# Patient Record
Sex: FEMALE | Employment: FULL TIME | URBAN - METROPOLITAN AREA
[De-identification: names, ages, dates, MRNs, and addresses within clinical notes are randomized per-mention and may not be internally consistent; named-entity substitution may affect disease eponyms.]

---

## 2017-01-09 LAB
ANTIBODY SCREEN, EXTERNAL: NORMAL
CHLAMYDIA, EXTERNAL: NORMAL
HGB EVAL, EXTERNAL: NORMAL
HIV, EXTERNAL: NON REACTIVE
N. GONORRHEA, EXTERNAL: NORMAL
PLATELET CNT,   EXTERNAL: 227
T. PALLIDUM, EXTERNAL: NORMAL
TYPE, ABO & RH, EXTERNAL: NORMAL

## 2017-05-08 LAB — URINALYSIS, EXTERNAL: NORMAL

## 2017-05-30 LAB
HCT, EXTERNAL: 31.6
HGB, EXTERNAL: 10.5
RUBELLA, EXTERNAL: NORMAL

## 2017-07-24 LAB — GRBS, EXTERNAL: NORMAL

## 2017-08-12 ENCOUNTER — ANESTHESIA EVENT (OUTPATIENT)
Dept: LABOR AND DELIVERY | Age: 32
End: 2017-08-12
Payer: COMMERCIAL

## 2017-08-12 ENCOUNTER — HOSPITAL ENCOUNTER (INPATIENT)
Age: 32
LOS: 2 days | Discharge: HOME | End: 2017-08-15
Attending: OBSTETRICS & GYNECOLOGY | Admitting: OBSTETRICS & GYNECOLOGY
Payer: COMMERCIAL

## 2017-08-12 ENCOUNTER — ANESTHESIA (OUTPATIENT)
Dept: LABOR AND DELIVERY | Age: 32
End: 2017-08-12
Payer: COMMERCIAL

## 2017-08-12 PROBLEM — Z37.9 NORMAL LABOR: Status: ACTIVE | Noted: 2017-08-12

## 2017-08-12 LAB
BASOPHILS # BLD AUTO: 0 K/UL (ref 0–0.1)
BASOPHILS # BLD: 0 % (ref 0–1)
EOSINOPHIL # BLD: 0.1 K/UL (ref 0–0.4)
EOSINOPHIL NFR BLD: 1 % (ref 0–7)
ERYTHROCYTE [DISTWIDTH] IN BLOOD BY AUTOMATED COUNT: 15 % (ref 11.5–14.5)
HCT VFR BLD AUTO: 37.6 % (ref 35–47)
HGB BLD-MCNC: 12.4 G/DL (ref 11.5–16)
LYMPHOCYTES # BLD AUTO: 14 % (ref 12–49)
LYMPHOCYTES # BLD: 1.4 K/UL (ref 0.8–3.5)
MCH RBC QN AUTO: 30 PG (ref 26–34)
MCHC RBC AUTO-ENTMCNC: 33 G/DL (ref 30–36.5)
MCV RBC AUTO: 91 FL (ref 80–99)
MONOCYTES # BLD: 1.3 K/UL (ref 0–1)
MONOCYTES NFR BLD AUTO: 12 % (ref 5–13)
NEUTS SEG # BLD: 7.6 K/UL (ref 1.8–8)
NEUTS SEG NFR BLD AUTO: 73 % (ref 32–75)
PLATELET # BLD AUTO: 214 K/UL (ref 150–400)
RBC # BLD AUTO: 4.13 M/UL (ref 3.8–5.2)
WBC # BLD AUTO: 10.3 K/UL (ref 3.6–11)

## 2017-08-12 PROCEDURE — 76815 OB US LIMITED FETUS(S): CPT | Performed by: OBSTETRICS & GYNECOLOGY

## 2017-08-12 PROCEDURE — 99284 EMERGENCY DEPT VISIT MOD MDM: CPT | Performed by: OBSTETRICS & GYNECOLOGY

## 2017-08-12 PROCEDURE — 77030014125 HC TY EPDRL BBMI -B: Performed by: ANESTHESIOLOGY

## 2017-08-12 PROCEDURE — 36415 COLL VENOUS BLD VENIPUNCTURE: CPT | Performed by: OBSTETRICS & GYNECOLOGY

## 2017-08-12 PROCEDURE — 74011250636 HC RX REV CODE- 250/636: Performed by: OBSTETRICS & GYNECOLOGY

## 2017-08-12 PROCEDURE — 75410000002 HC LABOR FEE PER 1 HR: Performed by: OBSTETRICS & GYNECOLOGY

## 2017-08-12 PROCEDURE — 10907ZC DRAINAGE OF AMNIOTIC FLUID, THERAPEUTIC FROM PRODUCTS OF CONCEPTION, VIA NATURAL OR ARTIFICIAL OPENING: ICD-10-PCS | Performed by: OBSTETRICS & GYNECOLOGY

## 2017-08-12 PROCEDURE — 85025 COMPLETE CBC W/AUTO DIFF WBC: CPT | Performed by: OBSTETRICS & GYNECOLOGY

## 2017-08-12 PROCEDURE — 4A0HXCZ MEASUREMENT OF PRODUCTS OF CONCEPTION, CARDIAC RATE, EXTERNAL APPROACH: ICD-10-PCS | Performed by: OBSTETRICS & GYNECOLOGY

## 2017-08-12 PROCEDURE — 96360 HYDRATION IV INFUSION INIT: CPT | Performed by: OBSTETRICS & GYNECOLOGY

## 2017-08-12 PROCEDURE — 59025 FETAL NON-STRESS TEST: CPT | Performed by: OBSTETRICS & GYNECOLOGY

## 2017-08-12 RX ORDER — FENTANYL/BUPIVACAINE/NS/PF 2-1250MCG
1-16 PREFILLED PUMP RESERVOIR EPIDURAL CONTINUOUS
Status: DISCONTINUED | OUTPATIENT
Start: 2017-08-13 | End: 2017-08-13

## 2017-08-12 RX ORDER — SODIUM CHLORIDE, SODIUM LACTATE, POTASSIUM CHLORIDE, CALCIUM CHLORIDE 600; 310; 30; 20 MG/100ML; MG/100ML; MG/100ML; MG/100ML
1000 INJECTION, SOLUTION INTRAVENOUS CONTINUOUS
Status: DISCONTINUED | OUTPATIENT
Start: 2017-08-12 | End: 2017-08-13

## 2017-08-12 RX ORDER — LIDOCAINE HYDROCHLORIDE AND EPINEPHRINE 20; 5 MG/ML; UG/ML
INJECTION, SOLUTION EPIDURAL; INFILTRATION; INTRACAUDAL; PERINEURAL AS NEEDED
Status: DISCONTINUED | OUTPATIENT
Start: 2017-08-12 | End: 2017-08-13 | Stop reason: HOSPADM

## 2017-08-12 RX ORDER — NALOXONE HYDROCHLORIDE 0.4 MG/ML
0.4 INJECTION, SOLUTION INTRAMUSCULAR; INTRAVENOUS; SUBCUTANEOUS AS NEEDED
Status: DISCONTINUED | OUTPATIENT
Start: 2017-08-12 | End: 2017-08-13

## 2017-08-12 RX ORDER — SODIUM CHLORIDE, SODIUM LACTATE, POTASSIUM CHLORIDE, CALCIUM CHLORIDE 600; 310; 30; 20 MG/100ML; MG/100ML; MG/100ML; MG/100ML
125 INJECTION, SOLUTION INTRAVENOUS CONTINUOUS
Status: DISCONTINUED | OUTPATIENT
Start: 2017-08-12 | End: 2017-08-13

## 2017-08-12 RX ADMIN — SODIUM CHLORIDE, SODIUM LACTATE, POTASSIUM CHLORIDE, AND CALCIUM CHLORIDE 125 ML/HR: 600; 310; 30; 20 INJECTION, SOLUTION INTRAVENOUS at 22:30

## 2017-08-12 RX ADMIN — SODIUM CHLORIDE, SODIUM LACTATE, POTASSIUM CHLORIDE, AND CALCIUM CHLORIDE 1000 ML: 600; 310; 30; 20 INJECTION, SOLUTION INTRAVENOUS at 21:30

## 2017-08-12 RX ADMIN — LIDOCAINE HYDROCHLORIDE AND EPINEPHRINE 10 ML: 20; 5 INJECTION, SOLUTION EPIDURAL; INFILTRATION; INTRACAUDAL; PERINEURAL at 23:47

## 2017-08-12 NOTE — IP AVS SNAPSHOT
[facility] 
 
 
 [address] [place] [address] 
[phone] Patient: Gretchen Thayer MRN: [id number] CEV:[id number] Current Discharge Medication List  
  
START taking these medications Dose & Instructions Dispensing Information Comments Morning Noon Evening Bedtime  
 docusate sodium 100 mg capsule Commonly known as:  Emerita Verduzco Your last dose was: Your next dose is:    
   
   
 Dose:  100 mg Take 1 Cap by mouth two (2) times daily as needed for Constipation for up to 90 days. Quantity:  30 Cap Refills:  1 HYDROcodone-acetaminophen 7.5-325 mg per tablet Commonly known as:  Jaxson Logan Your last dose was: Your next dose is:    
   
   
 Dose:  1 Tab Take 1 Tab by mouth every six (6) hours as needed. Max Daily Amount: 4 Tabs. Quantity:  8 Tab Refills:  0  
     
   
   
   
  
 ibuprofen 800 mg tablet Commonly known as:  MOTRIN Your last dose was: Your next dose is:    
   
   
 Dose:  800 mg Take 1 Tab by mouth every eight (8) hours as needed for Pain. Quantity:  40 Tab Refills:  1 Where to Get Your Medications Information on where to get these meds will be given to you by the nurse or doctor. ! Ask your nurse or doctor about these medications  
  docusate sodium 100 mg capsule HYDROcodone-acetaminophen 7.5-325 mg per tablet  
 ibuprofen 800 mg tablet

## 2017-08-12 NOTE — IP AVS SNAPSHOT
Abiodun Rao 
 
 
 [facility] [address] 
[phone] Patient: Angelica Iniguez MRN: [id number] QPS: You are allergic to the following Allergen Reactions Penicillin G Hives  
 childhood Recent Documentation Height Weight Breastfeeding? BMI OB Status Smoking Status 1.727 m 86.2 kg Unknown 28.89 kg/m2 Recent pregnancy Never Assessed Unresulted Labs Order Current Status SAMPLE TO BLOOD BANK In process Emergency Contacts Name Discharge Info Relation Home Work Mobile SanchezFred DISCHARGE CAREGIVER [3] Spouse [3]   [phone] About your hospitalization You were admitted on:  2017 You last received care in the:  [facility] 3 MOTHER INFANT You were discharged on:  August 15, 2017 Unit phone number:  [phone] Why you were hospitalized Your primary diagnosis was:  Not on File Your diagnoses also included:  Normal Labor, Abnormal  Test  
  
  
 
  
  
Providers Seen During Your Hospitalizations Provider Role Specialty Primary office phone Toro Underwood MD Attending Provider Obstetrics & Gynecology [phone] Jordana Lemus MD Attending Provider Obstetrics & Gynecology [phone] Your Primary Care Physician (PCP) Primary Care Physician Office Phone Office Fax NONE ** None ** ** None ** Follow-up Information Follow up With Details Comments Contact Info None   None (395) Patient stated that they have no PCP In 6 weeks Current Discharge Medication List  
  
START taking these medications Dose & Instructions Dispensing Information Comments Morning Noon Evening Bedtime  
 docusate sodium 100 mg capsule Commonly known as:  Ezio Norwich Your last dose was: Your next dose is:    
   
   
 Dose:  100 mg Take 1 Cap by mouth two (2) times daily as needed for Constipation for up to 90 days. Quantity:  30 Cap Refills:  1 HYDROcodone-acetaminophen 7.5-325 mg per tablet Commonly known as:  Tellis Points Your last dose was: Your next dose is:    
   
   
 Dose:  1 Tab Take 1 Tab by mouth every six (6) hours as needed. Max Daily Amount: 4 Tabs. Quantity:  8 Tab Refills:  0  
     
   
   
   
  
 ibuprofen 800 mg tablet Commonly known as:  MOTRIN Your last dose was: Your next dose is:    
   
   
 Dose:  800 mg Take 1 Tab by mouth every eight (8) hours as needed for Pain. Quantity:  40 Tab Refills:  1 Where to Get Your Medications Information on where to get these meds will be given to you by the nurse or doctor. ! Ask your nurse or doctor about these medications  
  docusate sodium 100 mg capsule HYDROcodone-acetaminophen 7.5-325 mg per tablet  
 ibuprofen 800 mg tablet Discharge Instructions POST DELIVERY DISCHARGE INSTRUCTIONS Name: Harika Velez YOB: 1985 Primary Diagnosis: Active Problems: 
  Normal labor (2017) Abnormal  test (2017) General:  
 
Diet/Diet Restrictions: 
· Eight 8-ounce glasses of fluid daily (water, juices); avoid excessive caffeine intake. · Meals/snacks as desired which are high in fiber and carbohydrates and low in fat and cholesterol. Physical Activity / Restrictions / Safety: · Avoid heavy lifting, no more that 8 lbs. For 2-3 weeks;  
· Limit use of stairs to 2 times daily for the first week home. · No driving for one week. · Avoid intercourse 4-6 weeks, no douching or tampon use. · Check with obstetrician before starting or resuming an exercise program.   
 
Discharge Instructions/Special Treatment/Home Care Needs:  
 
· Continue prenatal vitamins. · Continue to use squirt bottle with warm water on your episiotomy after each bathroom use until bleeding stops. · If steri-strips applied to your incision, remove in 7-10 days. Call your doctor for the following: · Fever over 101 degrees by mouth. · Vaginal bleeding heavier than a normal menstrual period or clots larger than a golf ball. · Red streaks or increased swelling of legs, painful red streaks on your breast. 
· Painful urination, constipation and increased pain or swelling or discharge with your incision. · If you feel extremely anxious or overwhelmed. · If you have thoughts of harming yourself and/or your baby. Pain Management:  
 
· Take Acetaminophen (Tylenol) or Ibuprofen (Advil, Motrin), as directed for pain. · Use a warm Sitz bath 3 times daily to relieve episiotomy or hemorrhoidal discomfort. · For hemorrhoidal discomfort, use Tucks and Anusol cream as needed and directed. · Heating pad to  incision as needed. Follow-Up Care:  
 
Appointment with MD: Follow-up Appointments Procedures  FOLLOW UP VISIT Appointment in: 6 Weeks Standing Status:   Standing Number of Occurrences:   1 Order Specific Question:   Appointment in Answer:   6 Weeks Telephone number: [phone] Signed By: Chirag Solorzano MD                                                                                                   Date: 8/15/2017 Time: 9:08 AM 
 
 
Discharge Orders None [employer] Announcement We are excited to announce that we are making your provider's discharge notes available to you in [employer] You will see these notes when they are completed and signed by the physician that discharged you from your recent hospital stay. If you have any questions or concerns about any information you see in [employer] please call the Health Information Department where you were seen or reach out to your Primary Care Provider for more information about your plan of care. Introducing [facility]!    
 [facility] introduces [employer] patient portal. Now you can access parts of your medical record, email your doctor's office, and request medication refills online. 1. In your internet browser, go to [email or web address] 2. Click on the First Time User? Click Here link in the Sign In box. You will see the New Member Sign Up page. 3. Enter your [employer] Access Code exactly as it appears below. You will not need to use this code after youve completed the sign-up process. If you do not sign up before the expiration date, you must request a new code. · [employer] Access Code: [email or web address] Expires: 10/14/2017  9:07 AM 
 
4. Enter the last four digits of your Social Security Number (xxxx) and Date of Birth (mm/dd/yyyy) as indicated and click Submit. You will be taken to the next sign-up page. 5. Create a [employer] ID. This will be your [employer] login ID and cannot be changed, so think of one that is secure and easy to remember. 6. Create a [employer] password. You can change your password at any time. 7. Enter your Password Reset Question and Answer. This can be used at a later time if you forget your password. 8. Enter your e-mail address. You will receive e-mail notification when new information is available in [address] 9. Click Sign Up. You can now view and download portions of your medical record. 10. Click the Download Summary menu link to download a portable copy of your medical information. If you have questions, please visit the Frequently Asked Questions section of the [employer] website. Remember, [employer] is NOT to be used for urgent needs. For medical emergencies, dial 911. Now available from your iPhone and Android! General Information Please provide this summary of care documentation to your next provider. Patient Signature:  ____________________________________________________________ Date:  ____________________________________________________________  
  
[facility]  Provider Signature: ____________________________________________________________ Date:  ____________________________________________________________

## 2017-08-12 NOTE — IP AVS SNAPSHOT
Summary of Care Report The Summary of Care report has been created to help improve care coordination. Users with access to [employer] or [address] (Web-based application) may access additional patient information including the Discharge Summary. If you are not currently a [address] user and need more information, please call the number listed below in the [place] section and ask to be connected with Medical Records. Facility Information Name Address Phone [address] [facility] [address] [phone] Patient Information Patient Name Sex  Sonia Wu ([id number]) Female 1985 Discharge Information Admitting Provider Service Area Unit Irene Sutherland MD / Jayshree Liao [address] [facility] 3 Mother Infant / [phone] Discharge Provider Discharge Date/Time Discharge Disposition Destination (none) 8/15/2017 (Pending) AHR (none) Patient Language Language ENGLISH [13] Hospital Problems as of 8/15/2017  Never Reviewed Class Noted - Resolved Last Modified POA Active Problems Normal labor  2017 - Present 2017 by Irene Sutherland MD Unknown Entered by Irene Sutherland MD  
  Abnormal  test  2017 - Present 2017 by Irene Sutherland MD Unknown Entered by Irene Sutherland MD  
  
You are allergic to the following Allergen Reactions Penicillin G Hives  
 childhood Current Discharge Medication List  
  
START taking these medications Dose & Instructions Dispensing Information Comments  
 docusate sodium 100 mg capsule Commonly known as:  Edi Leon Dose:  100 mg Take 1 Cap by mouth two (2) times daily as needed for Constipation for up to 90 days. Quantity:  30 Cap Refills:  1 HYDROcodone-acetaminophen 7.5-325 mg per tablet Commonly known as:  Alvina Hodgson Dose:  1 Tab Take 1 Tab by mouth every six (6) hours as needed. Max Daily Amount: 4 Tabs. Quantity:  8 Tab Refills:  0  
   
 ibuprofen 800 mg tablet Commonly known as:  MOTRIN Dose:  800 mg Take 1 Tab by mouth every eight (8) hours as needed for Pain. Quantity:  40 Tab Refills:  1 Surgery Information ID Date/Time Status Primary Surgeon All Procedures Location [id number] 2017 Complete   [facility] - DO NOT SCHEDULE Follow-up Information Follow up With Details Comments Contact Info None   None (395) Patient stated that they have no PCP In 6 weeks Discharge Instructions POST DELIVERY DISCHARGE INSTRUCTIONS Name: Oneida Flores YOB: 1985 Primary Diagnosis: Active Problems: 
  Normal labor (2017) Abnormal  test (2017) General:  
 
Diet/Diet Restrictions: 
· Eight 8-ounce glasses of fluid daily (water, juices); avoid excessive caffeine intake. · Meals/snacks as desired which are high in fiber and carbohydrates and low in fat and cholesterol. Physical Activity / Restrictions / Safety: · Avoid heavy lifting, no more that 8 lbs. For 2-3 weeks;  
· Limit use of stairs to 2 times daily for the first week home. · No driving for one week. · Avoid intercourse 4-6 weeks, no douching or tampon use. · Check with obstetrician before starting or resuming an exercise program.   
 
Discharge Instructions/Special Treatment/Home Care Needs:  
 
· Continue prenatal vitamins. · Continue to use squirt bottle with warm water on your episiotomy after each bathroom use until bleeding stops. · If steri-strips applied to your incision, remove in 7-10 days. Call your doctor for the following: · Fever over 101 degrees by mouth. · Vaginal bleeding heavier than a normal menstrual period or clots larger than a golf ball. · Red streaks or increased swelling of legs, painful red streaks on your breast. 
· Painful urination, constipation and increased pain or swelling or discharge with your incision. · If you feel extremely anxious or overwhelmed. · If you have thoughts of harming yourself and/or your baby. Pain Management:  
 
· Take Acetaminophen (Tylenol) or Ibuprofen (Advil, Motrin), as directed for pain. · Use a warm Sitz bath 3 times daily to relieve episiotomy or hemorrhoidal discomfort. · For hemorrhoidal discomfort, use Tucks and Anusol cream as needed and directed. · Heating pad to  incision as needed. Follow-Up Care:  
 
Appointment with MD: Follow-up Appointments Procedures  FOLLOW UP VISIT Appointment in: 6 Weeks Standing Status:   Standing Number of Occurrences:   1 Order Specific Question:   Appointment in Answer:   6 Weeks Telephone number: [phone] Signed By: Kevin Peace MD                                                                                                   Date: 8/15/2017 Time: 9:08 AM 
 
 
Chart Review Routing History No Routing History on File

## 2017-08-13 PROBLEM — O28.9 ABNORMAL ANTENATAL TEST: Status: ACTIVE | Noted: 2017-08-13

## 2017-08-13 PROCEDURE — 3E0R3CZ INTRODUCE REGIONAL ANESTH IN SPINAL CANAL, PERC: ICD-10-PCS | Performed by: ANESTHESIOLOGY

## 2017-08-13 PROCEDURE — 88307 TISSUE EXAM BY PATHOLOGIST: CPT | Performed by: OBSTETRICS & GYNECOLOGY

## 2017-08-13 PROCEDURE — 00HU33Z INSERTION OF INFUSION DEVICE INTO SPINAL CANAL, PERCUTANEOUS APPROACH: ICD-10-PCS | Performed by: ANESTHESIOLOGY

## 2017-08-13 PROCEDURE — 76060000078 HC EPIDURAL ANESTHESIA: Performed by: ANESTHESIOLOGY

## 2017-08-13 PROCEDURE — 74011250636 HC RX REV CODE- 250/636: Performed by: ANESTHESIOLOGY

## 2017-08-13 PROCEDURE — 74011250637 HC RX REV CODE- 250/637: Performed by: OBSTETRICS & GYNECOLOGY

## 2017-08-13 PROCEDURE — 51702 INSERT TEMP BLADDER CATH: CPT

## 2017-08-13 PROCEDURE — 75410000000 HC DELIVERY VAGINAL/SINGLE: Performed by: OBSTETRICS & GYNECOLOGY

## 2017-08-13 PROCEDURE — 74011250636 HC RX REV CODE- 250/636: Performed by: OBSTETRICS & GYNECOLOGY

## 2017-08-13 PROCEDURE — 77030034850

## 2017-08-13 PROCEDURE — 75410000003 HC RECOV DEL/VAG/CSECN EA 0.5 HR: Performed by: OBSTETRICS & GYNECOLOGY

## 2017-08-13 PROCEDURE — 74011000258 HC RX REV CODE- 258: Performed by: OBSTETRICS & GYNECOLOGY

## 2017-08-13 PROCEDURE — 75410000002 HC LABOR FEE PER 1 HR: Performed by: OBSTETRICS & GYNECOLOGY

## 2017-08-13 PROCEDURE — 74011250636 HC RX REV CODE- 250/636

## 2017-08-13 PROCEDURE — 77010026065 HC OXYGEN MINIMUM MEDICAL AIR: Performed by: OBSTETRICS & GYNECOLOGY

## 2017-08-13 PROCEDURE — 65270000029 HC RM PRIVATE

## 2017-08-13 PROCEDURE — 74011000250 HC RX REV CODE- 250

## 2017-08-13 RX ORDER — SODIUM CHLORIDE, SODIUM LACTATE, POTASSIUM CHLORIDE, CALCIUM CHLORIDE 600; 310; 30; 20 MG/100ML; MG/100ML; MG/100ML; MG/100ML
125 INJECTION, SOLUTION INTRAVENOUS CONTINUOUS
Status: DISCONTINUED | OUTPATIENT
Start: 2017-08-13 | End: 2017-08-15 | Stop reason: HOSPADM

## 2017-08-13 RX ORDER — ACETAMINOPHEN 500 MG
1000 TABLET ORAL
Status: COMPLETED | OUTPATIENT
Start: 2017-08-13 | End: 2017-08-13

## 2017-08-13 RX ORDER — SODIUM CHLORIDE 0.9 % (FLUSH) 0.9 %
5-10 SYRINGE (ML) INJECTION AS NEEDED
Status: DISCONTINUED | OUTPATIENT
Start: 2017-08-13 | End: 2017-08-15 | Stop reason: HOSPADM

## 2017-08-13 RX ORDER — HYDROCORTISONE ACETATE PRAMOXINE HCL 2.5; 1 G/100G; G/100G
CREAM TOPICAL AS NEEDED
Status: DISCONTINUED | OUTPATIENT
Start: 2017-08-13 | End: 2017-08-15 | Stop reason: HOSPADM

## 2017-08-13 RX ORDER — IBUPROFEN 800 MG/1
800 TABLET ORAL EVERY 8 HOURS
Status: DISCONTINUED | OUTPATIENT
Start: 2017-08-13 | End: 2017-08-15 | Stop reason: HOSPADM

## 2017-08-13 RX ORDER — HYDROCODONE BITARTRATE AND ACETAMINOPHEN 7.5; 325 MG/1; MG/1
1 TABLET ORAL
Status: DISCONTINUED | OUTPATIENT
Start: 2017-08-13 | End: 2017-08-15 | Stop reason: HOSPADM

## 2017-08-13 RX ORDER — NALOXONE HYDROCHLORIDE 0.4 MG/ML
0.4 INJECTION, SOLUTION INTRAMUSCULAR; INTRAVENOUS; SUBCUTANEOUS AS NEEDED
Status: DISCONTINUED | OUTPATIENT
Start: 2017-08-13 | End: 2017-08-15 | Stop reason: HOSPADM

## 2017-08-13 RX ORDER — SODIUM CHLORIDE 0.9 % (FLUSH) 0.9 %
5-10 SYRINGE (ML) INJECTION EVERY 8 HOURS
Status: DISCONTINUED | OUTPATIENT
Start: 2017-08-13 | End: 2017-08-15 | Stop reason: HOSPADM

## 2017-08-13 RX ORDER — SWAB
1 SWAB, NON-MEDICATED MISCELLANEOUS DAILY
Status: DISCONTINUED | OUTPATIENT
Start: 2017-08-13 | End: 2017-08-15 | Stop reason: HOSPADM

## 2017-08-13 RX ORDER — ACETAMINOPHEN 325 MG/1
650 TABLET ORAL
Status: DISCONTINUED | OUTPATIENT
Start: 2017-08-13 | End: 2017-08-15 | Stop reason: HOSPADM

## 2017-08-13 RX ORDER — MORPHINE SULFATE 10 MG/ML
5 INJECTION, SOLUTION INTRAMUSCULAR; INTRAVENOUS
Status: DISCONTINUED | OUTPATIENT
Start: 2017-08-13 | End: 2017-08-15 | Stop reason: HOSPADM

## 2017-08-13 RX ORDER — ACETAMINOPHEN 500 MG
TABLET ORAL
Status: DISPENSED
Start: 2017-08-13 | End: 2017-08-13

## 2017-08-13 RX ORDER — OXYTOCIN IN 5 % DEXTROSE 30/500 ML
PLASTIC BAG, INJECTION (ML) INTRAVENOUS
Status: COMPLETED
Start: 2017-08-13 | End: 2017-08-13

## 2017-08-13 RX ORDER — OXYTOCIN/RINGER'S LACTATE 20/1000 ML
125-500 PLASTIC BAG, INJECTION (ML) INTRAVENOUS ONCE
Status: ACTIVE | OUTPATIENT
Start: 2017-08-13 | End: 2017-08-13

## 2017-08-13 RX ADMIN — GENTAMICIN SULFATE 319.6 MG: 40 INJECTION, SOLUTION INTRAMUSCULAR; INTRAVENOUS at 01:11

## 2017-08-13 RX ADMIN — ACETAMINOPHEN 1000 MG: 500 TABLET ORAL at 01:12

## 2017-08-13 RX ADMIN — Medication 10 ML: at 13:30

## 2017-08-13 RX ADMIN — MUPIROCIN: 20 OINTMENT TOPICAL at 22:00

## 2017-08-13 RX ADMIN — FENTANYL 0.2 MG/100ML-BUPIV 0.125%-NACL 0.9% EPIDURAL INJ 12 ML/HR: 2/0.125 SOLUTION at 00:10

## 2017-08-13 RX ADMIN — VANCOMYCIN HYDROCHLORIDE 1000 MG: 1 INJECTION, POWDER, LYOPHILIZED, FOR SOLUTION INTRAVENOUS at 13:30

## 2017-08-13 RX ADMIN — VANCOMYCIN HYDROCHLORIDE 1000 MG: 1 INJECTION, POWDER, LYOPHILIZED, FOR SOLUTION INTRAVENOUS at 00:02

## 2017-08-13 RX ADMIN — SODIUM CHLORIDE, SODIUM LACTATE, POTASSIUM CHLORIDE, AND CALCIUM CHLORIDE 125 ML/HR: 600; 310; 30; 20 INJECTION, SOLUTION INTRAVENOUS at 00:26

## 2017-08-13 RX ADMIN — Medication 999 ML/HR: at 01:40

## 2017-08-13 NOTE — PROGRESS NOTES
0700 Received bedside verbal report from Cynthia Cardenas RN. Patient is sitting up in the bed and ready to be transferred to MIU. Patient denies pain but does confirm some cramping and does not want any medication at his time. Waiting for baby report to be given and will transfer patient to MIU.       0830 TRANSFER - OUT REPORT:    Verbal report given to Teresa Mcclain RN(name) on Armida Peoples  being transferred to MIU(unit) for routine progression of care       Report consisted of patients Situation, Background, Assessment and   Recommendations(SBAR). Information from the following report(s) SBAR, Kardex and MAR was reviewed with the receiving nurse. Lines:   Peripheral IV 08/12/17 Right Hand (Active)   Site Assessment Clean, dry, & intact 8/13/2017  3:34 AM   Phlebitis Assessment 0 8/13/2017  3:34 AM   Infiltration Assessment 0 8/13/2017  3:34 AM   Dressing Status Clean, dry, & intact 8/13/2017  3:34 AM   Dressing Type Tape;Transparent 8/13/2017  3:34 AM   Hub Color/Line Status Pink 8/13/2017  3:34 AM        Opportunity for questions and clarification was provided.       Patient transported with:   Registered Nurse

## 2017-08-13 NOTE — PROGRESS NOTES
[address] Dr Judy Colon notified of [address], pt given 1 liter bolus, juice, O2 and Acoustic Stimulation attempted with no response from fetus. Requested to come to bedside to eval pt.  2245 Dr Judy Colon at bedside, Ultrasound preformed. 2300 Dr Judy Colon discussed with pt US results and need for induction  2304 AROM  2310 pt request epidural  2330 pt sitting up for epidural placement  0630 Pt up to BR walks with a steady gait. Able to void 300ml. Gricelda care taught with verbal understanding and demonstration by pt. New ice pack, tucks, panties and gown applied to pt. Epidural removed with tip intact. Pt assisted back to bed. Informed to call with needs and she needs to be assisted to BR at least once more time. Pt states understanding.

## 2017-08-13 NOTE — PROGRESS NOTES
Bedside and Verbal shift change report given to [facility] (oncoming nurse) by Arleth Harkins RN (offgoing nurse). Report included the following information SBAR, Intake/Output, MAR and Recent Results.

## 2017-08-13 NOTE — ANESTHESIA PREPROCEDURE EVALUATION
Anesthetic History No history of anesthetic complications Review of Systems / Medical History Patient summary reviewed, nursing notes reviewed and pertinent labs reviewed Pulmonary Within defined limits Neuro/Psych Within defined limits Cardiovascular Within defined limits Exercise tolerance: >4 METS 
  
GI/Hepatic/Renal 
Within defined limits Endo/Other Within defined limits Other Findings Physical Exam 
 
Airway Mallampati: II 
 
Neck ROM: normal range of motion Mouth opening: Normal 
 
 Cardiovascular Regular rate and rhythm,  S1 and S2 normal,  no murmur, click, rub, or gallop Rhythm: regular Rate: normal 
 
 
 
 Dental 
No notable dental hx Pulmonary Breath sounds clear to auscultation Abdominal 
GI exam deferred Other Findings Anesthetic Plan ASA: 2 Anesthesia type: epidural 
 
 
Post-op pain plan if not by surgeon: indwelling epidural catheter Anesthetic plan and risks discussed with: Patient Late entry - preop done, questions answered, and consent obtained prior to procedure; note entered after procedure at 11:49 PM

## 2017-08-13 NOTE — PROGRESS NOTES
Bedside, Verbal and Written shift change report given to Naya Jordan (oncoming nurse) by Franky Lincoln (offgoing nurse). Report included the following information SBAR, Intake/Output, MAR and Recent Results.

## 2017-08-13 NOTE — ANESTHESIA PROCEDURE NOTES
Epidural Block    Start time: 8/12/2017 11:33 PM  End time: 8/12/2017 11:50 PM  Performed by: Garret Barajas by: Liyah Tapia     Pre-Procedure  Indication: labor epidural    Preanesthetic Checklist: patient identified, risks and benefits discussed, anesthesia consent, patient being monitored, timeout performed and anesthesia consent    Timeout Time: 23:33        Epidural:   Patient position:  Seated  Prep region:  Lumbar  Prep: Betadine    Location:  L3-4    Needle and Epidural Catheter:   Needle Type:  Tuohy  Needle Gauge:  17 G  Injection Technique:  Loss of resistance using air  Attempts:  1  Catheter Size:  20 G  Catheter at Skin Depth (cm):  11  Depth in Epidural Space (cm):  5  Events: no blood with aspiration, no cerebrospinal fluid with aspiration and negative aspiration test    Test Dose:  Lidocaine 1.5% w/ epi and negative    Assessment:   Catheter Secured:  Tape  Insertion:  Uncomplicated  Patient tolerance:  Patient tolerated the procedure well with no immediate complications

## 2017-08-13 NOTE — L&D DELIVERY NOTE
Patient: Morena Rasheed  MRN: [id number]   Delivery Note    Obstetrician:  Torrie Gowers, MD    Pre-Delivery Diagnosis:   <principal problem not specified>  Non-reassuring  testing  Delivery:          Amniotic Fluid Volume : Moderate thick meconium    Delivery Complications:  Chorioamnionitis  Decreased variability  Foul smelling fluid     Additional Delivery Details:         Infant Details:  Information for the patient's :  Ravin Coker [[id number]]          No results found for: APH, APCO2, APO2, AHCO3, ABEC, ABDC, O2ST, SITE, RSCOM    Placenta:    meconium stained, foul smelling    Episiotomy/Laceration(s):none            Episiotomy/Laceration(s) Repair: Not necessary.      Group Beta Strep:negative       Signed By:  Torrie Gowers, MD     2017

## 2017-08-13 NOTE — PROGRESS NOTES
Bedside and Verbal shift change report given to HUMERA Quintana RN (oncoming nurse) by ALYSA Gonzalez RN (offgoing nurse). Report included the following information SBAR, Kardex, Intake/Output and MAR.

## 2017-08-13 NOTE — H&P
History & Physical    Name: Lonnie De Paz MRN: [id number]  SSN: [id number]    YOB: 1985  Age: 28 y.o. Sex: female        Subjective:     Estimated Date of Delivery: 17  OB History    Para Term  AB Living   1        SAB TAB Ectopic Molar Multiple Live Births              # Outcome Date GA Lbr Santiago/2nd Weight Sex Delivery Anes PTL Lv   1 Current                   Ms. Liam Baeza is seen with pregnancy at 38w6d for contractions. Patient denies leakage of fluid, vaginal bleeding, RUQ pain, epigastric pain, chest pain, visual disturbances, headache, urinary complaints and swelling. She is able to appreciate fetal movements. Prenatal course was normal. Please see prenatal records for details. No past medical history on file. No past surgical history on file. Social History     Occupational History    Not on file. Social History Main Topics    Smoking status: Not on file    Smokeless tobacco: Not on file    Alcohol use Not on file    Drug use: Not on file    Sexual activity: Not on file     No family history on file. Allergies   Allergen Reactions    Penicillin G Hives     childhood     Prior to Admission medications    Not on File        Review of Systems: A comprehensive review of systems was negative except for that written in the HPI. Objective:     Vitals:  Vitals:    17 2040 17 2304   Temp:  99 °F (37.2 °C)   Weight: 86.2 kg (190 lb)    Height: 5' 8\" (1.727 m)         Physical Exam:  Patient without distress.   Heart: Regular rate and rhythm or S1S2 present  Lung: clear to auscultation throughout lung fields, no wheezes, no rales, no rhonchi and normal respiratory effort  Abdomen: soft, nontender, protuberant  Fundus: soft and non tender  Cervical Exam: 3 cm dilated    Lower Extremities:  - Edema 1+  Cervical Exam: Cervical Exam  Dilation (cm): 3  Eff: 90 %  Station: -2  Position: Mid  Vaginal exam done by? : halle  Membrane Status: AROM  Membranes: Artificial Rupture of Membranes; Amniotic Fluid: medium amount of thick meconium fluid  Fetal Heart Rate: non-reactive  Biophysical profile at bedside  with points for KOKI    Prenatal Labs:   No results found for: RUBELLAEXT, GRBSEXT, HBSAGEXT, HIVEXT, RPREXT, GONNOEXT, CHLAMEXT      Assessment/Plan:     Ms. Marsha Garibay is a  seen with pregnancy at 38w6d for induction of labor. Group B Strep was negative. Plan:   1. Prenatal course normal - Poor  testing tonight. Would recommend delivery. Membranes ruptured, foul smelling amniotic fluid with thick meconium, I strongly suspect that she may have chorioamnionitis. Will start antibiotic therapy and request NICU attendance at delivery. 2. Continue expectant management: Fetal Monitoring, tocometry, serial cervical checks Q3-4h in latent phase; Q2h in active labor and Q1h in 2nd stage   3. Anticipate vaginal delivery  4. Pain management with IV analgesics (Nubain 10mg Q2h IV PRN), or epidural anesthesia. 5. Zofran 4mg IV Q4h PRN for nausea   6.  Diet: NPO with ice chips            Renae Roger MD    2017

## 2017-08-13 NOTE — PROGRESS NOTES
SBAR IN Report: Mother    Verbal report received from HUMERA Heard RN (full name & credentials) on this patient, who is now being transferred from L&D (unit) for routine progression of care. Report consisted of patient's Situation, Background, Assessment and Recommendations (SBAR). Irma ID bands were compared with the identification form, and verified with the patient and transferring nurse. Information from the SBAR, Kardex, Intake/Output and MAR and the [place] Report was reviewed with the transferring nurse; opportunity for questions and clarification provided.

## 2017-08-14 LAB
HCT VFR BLD AUTO: 31.9 % (ref 35–47)
HGB BLD-MCNC: 10.9 G/DL (ref 11.5–16)

## 2017-08-14 PROCEDURE — 85018 HEMOGLOBIN: CPT | Performed by: OBSTETRICS & GYNECOLOGY

## 2017-08-14 PROCEDURE — 65270000029 HC RM PRIVATE

## 2017-08-14 PROCEDURE — 74011000258 HC RX REV CODE- 258: Performed by: OBSTETRICS & GYNECOLOGY

## 2017-08-14 PROCEDURE — 36415 COLL VENOUS BLD VENIPUNCTURE: CPT | Performed by: OBSTETRICS & GYNECOLOGY

## 2017-08-14 PROCEDURE — 74011250637 HC RX REV CODE- 250/637: Performed by: OBSTETRICS & GYNECOLOGY

## 2017-08-14 PROCEDURE — 74011250636 HC RX REV CODE- 250/636: Performed by: OBSTETRICS & GYNECOLOGY

## 2017-08-14 RX ADMIN — Medication 10 ML: at 00:42

## 2017-08-14 RX ADMIN — VANCOMYCIN HYDROCHLORIDE 1000 MG: 1 INJECTION, POWDER, LYOPHILIZED, FOR SOLUTION INTRAVENOUS at 02:56

## 2017-08-14 RX ADMIN — Medication 1 TABLET: at 09:53

## 2017-08-14 RX ADMIN — GENTAMICIN SULFATE 319.6 MG: 40 INJECTION, SOLUTION INTRAMUSCULAR; INTRAVENOUS at 00:34

## 2017-08-14 NOTE — ROUTINE PROCESS
Bedside and Verbal shift change report given to CHARLEY Segura RN (oncoming nurse) by Monica Beltran (offgoing nurse). Report included the following information SBAR, Procedure Summary, Intake/Output, MAR, Accordion, Recent Results and Med Rec Status.

## 2017-08-14 NOTE — PROGRESS NOTES
Post-Partum Day Number 1 Progress Note    Edith Sanchez     Assessment: Doing well, post partum day 1    Plan:  1. Continue routine postpartum and perineal care as well as maternal education. 2. The risks and benefits of the circumcision  procedure and anesthesia including: bleeding, infection, variability of cosmetic results were discussed at length with the mother. She is aware that future repeat procedures may be necessary. She gives informed consent to proceed as noted and her questions are answered. Will hold on circumcision until this afternoon as mom reports some feeding problems for baby. Information for the patient's :  Belgica Castro, Male [[id number]]   Vaginal, Spontaneous Delivery   Patient doing well without significant complaint. Voiding without difficulty, normal lochia. Vitals:  Visit Vitals    /55 (BP 1 Location: Left arm, BP Patient Position: At rest)    Pulse 97    Temp 98.6 °F (37 °C)    Resp 16    Ht 5' 8\" (1.727 m)    Wt 86.2 kg (190 lb)    SpO2 99%    Breastfeeding Unknown    BMI 28.89 kg/m2     Temp (24hrs), Av.3 °F (36.8 °C), Min:97.4 °F (36.3 °C), Max:98.6 °F (37 °C)        Exam:   Patient without distress. Abdomen soft, fundus firm, nontender                Perineum with normal lochia noted. Lower extremities are negative for swelling, cords or tenderness.     Labs:     Lab Results   Component Value Date/Time    WBC 10.3 2017 11:15 PM    HGB 10.9 2017 12:28 AM    HGB 12.4 2017 11:15 PM    HCT 31.9 2017 12:28 AM    HCT 37.6 2017 11:15 PM    PLATELET 102  11:15 PM    Hgb, External 10.5 2017    Hct, External 31.6 2017    Platelet cnt., External 227 2017       Recent Results (from the past 24 hour(s))   HGB & HCT    Collection Time: 17 12:28 AM   Result Value Ref Range    HGB 10.9 (L) 11.5 - 16.0 g/dL    HCT 31.9 (L) 35.0 - 47.0 %

## 2017-08-14 NOTE — PROGRESS NOTES
0730 Patient assessment complete; patient denies any pain at this time; denies any other needs at this time; is ordering breakfast at this time. IV removed without difficulty as antibiotic treatment complete.

## 2017-08-14 NOTE — PROGRESS NOTES
08/14/17 1:21 PM  CM met with STAR to complete initial assessment and to begin discharge planning. Demographics were reviewed and confirmed. STAR works for [employer] and will have between 6 and 12 weeks off. LUBNA is Billie Rapp ([phone]) and he will have a few days away. This is the first child for MOB and LUBNA together; LUBNA has three other sons, of which the 3year old resides with the couple. STAR's mother is her main support, she lives in [place] but has been with STAR since March and plans to stay for several more months. STAR is breastfeeding and has a pump to use. Patient has car seat, crib, clothing, and other necessary supplies. [facility] will provide medical follow up for the baby. Denied need for [facility] and Medicaid services.   Care Management Interventions  PCP Verified by CM:  (List provided)  Transition of Care Consult (CM Consult): Discharge Planning  Current Support Network: Lives with Spouse  Confirm Follow Up Transport: Family  Plan discussed with Pt/Family/Caregiver: Yes  Discharge Location  Discharge Placement: [address] [place] [address]

## 2017-08-14 NOTE — ROUTINE PROCESS
Bedside and Verbal shift change report given to [facility] (oncoming nurse) by Minnie Murdock RN (offgoing nurse). Report included the following information SBAR, Kardex, Intake/Output and MAR.

## 2017-08-14 NOTE — LACTATION NOTE
Problem: Lactation Care Plan  Goal: *Infant latching appropriately  Outcome: Progressing Towards Goal  Baby latches then has a few suckles and stops. Shown mom how to hand express, many drops noted,  Baby licks at drops and latches.       Discussed [id number] different position, how to massge breat to continue  Giving drops to  Baby.        Pt will successfully establish breastfeeding by feeding in response to early feeding cues   or wake every 3h, will obtain deep latch, and will keep log of feedings/output. Taught to BF at hunger cues and or q 2-3 hrs and to offer 10-20 drops of hand expressed colostrum at any non-feeds.        Breast Assessment  Left Breast: Medium  Left Nipple: Everted, Intact  Right Breast: Medium  Right Nipple: Everted, Intact  Breast- Feeding Assessment  Attends Breast-Feeding Classes: No  Breast-Feeding Experience: No  Breast Trauma/Surgery: No  Type/Quality: [address] [email or web address] [facility]  Lactation Consultant Visits  Breast-Feedings: Fair  Mother/Infant Observation  Mother Observation: Alignment, Breast comfortable, Close hold, Holds breast, Lets baby end feeding, Nipple round on release, Recognizes feeding cues  Infant Observation: Audible swallows, Breast tissue moves, Latches nipple and aereolae, Lips flanged, lower, Lips flanged, upper, Opens mouth  LATCH Documentation  Latch: Repeated attempts, hold nipple in mouth, stimulate to suck  Audible Swallowing: A few with stimulation  Type of Nipple: Everted (after stimulation)  Comfort (Breast/Nipple): Soft/non-tender  Hold (Positioning): Full assist, teach one side, mother does other, staff holds  LATCH Score: 7          Goal: *Weight loss less than 10% of birth weight  Outcome: Progressing Towards Goal      Encouraged mom to attempt feeding with baby led feeding cues. Just as sucking on fingers, rooting, mouthing. Looking for 8-12 feedings in 24 hours. Don't limit baby at breast, allow baby to come of breast on it's own.  Baby may want to feed  often and may increase number of feedings on second day of life. Skin to skin encouraged.        If baby doesn't nurse,  Mom should  hand express  10-20 drops of colostrum and drip into baby's mouth, or give to baby by finger feeding, cup feeding, or spoon feeding at least every 2-3 hours.          Problem: Patient Education: Go to Patient Education Activity  Goal: Patient/Family Education  Outcome: Progressing Towards Goal  Reviewed breastfeeding basics:  Supply and demand,  stomach size, early  Feeding cues, skin to skin, positioning and baby led latch-on, assymetrical latch with signs of good, deep latch vs shallow, feeding frequency and duration, and log sheet for tracking infant feedings and output. Breastfeeding Booklet and Warm line information given. Discussed typical  weight loss and the importance of infant weight checks with pediatrician 1-2 post discharge.      Hand Expression Education:  Mom taught how to manually hand express her colostrum. Emphasized the importance of providing infant with valuable colostrum as infant rests skin to skin at breast.  Aware to avoid extended periods of non-feeding. Aware to offer 10-20+ drops of colostrum every 2-3 hours until infant is latching and nursing effectively. Taught the rationale behind this low tech but highly effective evidence based practice.      Discussed with mother her plan for feeding. Reviewed the benefits of exclusive breast milk feeding during the hospital stay. Informed her of the risks of using formula to supplement in the first few days of life as well as the benefits of successful breast milk feeding; referred her to the Breastfeeding booklet about this information. She acknowledges understanding of information reviewed and states that it is her plan to breast feed first, and may offer formula to her infant.   Will support her choice and offer additional information as needed.

## 2017-08-15 VITALS
TEMPERATURE: 98.3 F | OXYGEN SATURATION: 99 % | HEIGHT: 68 IN | BODY MASS INDEX: 28.79 KG/M2 | SYSTOLIC BLOOD PRESSURE: 113 MMHG | RESPIRATION RATE: 16 BRPM | HEART RATE: 86 BPM | WEIGHT: 190 LBS | DIASTOLIC BLOOD PRESSURE: 66 MMHG

## 2017-08-15 PROCEDURE — 77030036554

## 2017-08-15 RX ORDER — DOCUSATE SODIUM 100 MG/1
100 CAPSULE, LIQUID FILLED ORAL
Qty: 30 CAP | Refills: 1 | Status: SHIPPED | OUTPATIENT
Start: 2017-08-15 | End: 2017-11-13

## 2017-08-15 RX ORDER — IBUPROFEN 800 MG/1
800 TABLET ORAL
Qty: 40 TAB | Refills: 1 | Status: SHIPPED | OUTPATIENT
Start: 2017-08-15 | End: 2018-02-12

## 2017-08-15 RX ORDER — HYDROCODONE BITARTRATE AND ACETAMINOPHEN 7.5; 325 MG/1; MG/1
1 TABLET ORAL
Qty: 8 TAB | Refills: 0 | Status: SHIPPED | OUTPATIENT
Start: 2017-08-15 | End: 2018-02-12

## 2017-08-15 NOTE — PROGRESS NOTES
Post-Partum Day Number 2 Progress Note    Edith Sacnhez     Assessment: Doing well, post partum day 2    Plan:   1. Discharge home today  2. Follow up in office in 6 weeks with Carloz Grijalva MD  3. Post partum activity advised, diet as tolerated  4. Discharge Medications: ibuprofen, percocet and medications prior to admission    Information for the patient's :  José Askew, Male [[id number]]   Vaginal, Spontaneous Delivery   Patient doing well without significant complaint. Voiding without difficulty, normal lochia. Vitals:  Visit Vitals    /66 (BP 1 Location: Left arm, BP Patient Position: At rest)    Pulse 86    Temp 98.3 °F (36.8 °C)    Resp 16    Ht 5' 8\" (1.727 m)    Wt 86.2 kg (190 lb)    SpO2 99%    Breastfeeding Unknown    BMI 28.89 kg/m2     Temp (24hrs), Av.6 °F (37 °C), Min:98.3 °F (36.8 °C), Max:98.8 °F (37.1 °C)      Exam:         Patient without distress. Abdomen soft, fundus firm, nontender                 Lower extremities are negative for swelling, cords or tenderness. Labs:     Lab Results   Component Value Date/Time    WBC 10.3 2017 11:15 PM    HGB 10.9 2017 12:28 AM    HGB 12.4 2017 11:15 PM    HCT 31.9 2017 12:28 AM    HCT 37.6 2017 11:15 PM    PLATELET 589  11:15 PM    Hgb, External 10.5 2017    Hct, External 31.6 2017    Platelet cnt., External 227 2017       No results found for this or any previous visit (from the past 24 hour(s)).

## 2017-08-15 NOTE — PROGRESS NOTES
Discharge instructions reviewed with patient to include signs and symptoms to notify MD, take home prescriptions and  follow up appointments. Patient verbalized understanding of all teaching and voiced no concerns at this time. Patient discharged home.

## 2017-08-15 NOTE — LACTATION NOTE
Child standing on cot playing with chain curtain draw. Discussed with his mother, to watch child carefully  playing with blind pull. And standing on cot.

## 2017-08-15 NOTE — LACTATION NOTE
Problem: Lactation Care Plan  Goal: *Infant latching appropriately  Outcome: Resolved/Met Date Met:  08/15/17  Mom states baby won't stay latched to breast, and takes a couple of suckles then stops. Mom has started pumping and giving expressed milk. Mom pumping and getting about 30 cc of pumped milk.      Discussed ways to help baby nurse at breast.          Goal: *Weight loss less than 10% of birth weight  Outcome: Resolved/Met Date Met:  08/15/17  Encouraged mom to attempt feeding with baby led feeding cues. Just as sucking on fingers, rooting, mouthing. Looking for 8-12 feedings in 24 hours. Don't limit baby at breast, allow baby to come of breast on it's own. Baby may want to feed  often and may increase number of feedings on second day of life. Skin to skin encouraged.        If baby doesn't nurse,  Mom should  Pump and give infant any expressed milk. If not pumping any milk, mom should contact pediatrician for possible need for supplementation.      Problem: Patient Education: Go to Patient Education Activity  Goal: Patient/Family Education  Outcome: Resolved/Met Date Met:  08/15/17  Discussed eating a healthy diet. Instructed mother to eat a variety of foods in order to get a well balanced diet. She should consume an extra 300-500 calories per day (more than her non-pregnant requirement.) These extra calories will help provide energy needed for optimal breast milk production. Mother also encouraged to \"drink to thirst\" and it is recommended that she drink fluids such as water and fruit/vegetable juice. Nutritious snacks should be available so that she can eat throughout the day to help satisfy her hunger and maintain a good milk supply. Continue taking your prenatal vitamins as long as you breast feed.       Chart shows numerous feedings, void, stool WNL. Discussed Importance of monitoring outputs and feedings on first week of  Breastfeeding.   Discussed ways to tell if baby getting enough, ie  Voids and stools, by day 7, baby should have at least  4-6 wet diapers a day, change in color of stool to a seedy yellow, and return to birth wt within 2 weeks with a steady increase after that. .  Follow up with pediatrician visit for weight check in 1-2 days reviewed. Discussed Breast feeding support groups and encouraged to call warm line number, [phone] or [facility] at [phone]  for any questions/problems that arise.       Encouraged mom to attempt feeding with baby led feeding cues. Just as sucking on fingers, rooting, mouthing. Looking for 8-12 feedings in 24 hours. Don't limit baby at breast, allow baby to come of breast on it's own. Baby may want to feed  often and may increase number of feedings on second day of life. Skin to skin encouraged.        If baby doesn't nurse,  Mom should  Pump and give infant any expressed milk. If not pumping any milk, mom should contact pediatrician for possible need for supplementation.          Engorgement Care Guidelines:  Anticipatory guidance shared. Reviewed how milk is made and normal phases of milk production. Taught care of engorged breasts -and how to help. If mom should experience engorged breas frequent breastfeeding encouraged, cool packs and motrin as tolerated.   Hilda Maldonado      Call your doctor, midwife and/or lactation consultant if:  · Baby is having no wet or dirty diapers   · Baby has dark colored urine after day 3  (should be pale yellow to clear)   · Baby has dark colored stools after day 4  (should be mustard yellow, with no meconium)   · Baby has fewer wet/soiled diapers or nurses less   frequently than the goals listed here   · Mom has symptoms of mastitis   (sore breast with fever, chills, flu-like aching)        Given information on pumping and saving milk.

## 2017-08-15 NOTE — DISCHARGE INSTRUCTIONS
POST DELIVERY DISCHARGE INSTRUCTIONS    Name: Karlie Higuera  YOB: 1985  Primary Diagnosis: Active Problems:    Normal labor (2017)      Abnormal  test (2017)        General:     Diet/Diet Restrictions:  · Eight 8-ounce glasses of fluid daily (water, juices); avoid excessive caffeine intake. · Meals/snacks as desired which are high in fiber and carbohydrates and low in fat and cholesterol. Physical Activity / Restrictions / Safety:     · Avoid heavy lifting, no more that 8 lbs. For 2-3 weeks;   · Limit use of stairs to 2 times daily for the first week home. · No driving for one week. · Avoid intercourse 4-6 weeks, no douching or tampon use. · Check with obstetrician before starting or resuming an exercise program.      Discharge Instructions/Special Treatment/Home Care Needs:     · Continue prenatal vitamins. · Continue to use squirt bottle with warm water on your episiotomy after each bathroom use until bleeding stops. · If steri-strips applied to your incision, remove in 7-10 days. Call your doctor for the following:     · Fever over 101 degrees by mouth. · Vaginal bleeding heavier than a normal menstrual period or clots larger than a golf ball. · Red streaks or increased swelling of legs, painful red streaks on your breast.  · Painful urination, constipation and increased pain or swelling or discharge with your incision. · If you feel extremely anxious or overwhelmed. · If you have thoughts of harming yourself and/or your baby. Pain Management:     · Take Acetaminophen (Tylenol) or Ibuprofen (Advil, Motrin), as directed for pain. · Use a warm Sitz bath 3 times daily to relieve episiotomy or hemorrhoidal discomfort. · For hemorrhoidal discomfort, use Tucks and Anusol cream as needed and directed. · Heating pad to  incision as needed.      Follow-Up Care:     Appointment with MD:   Follow-up Appointments   Procedures    FOLLOW UP VISIT Appointment in: 6 Weeks     Standing Status:   Standing     Number of Occurrences:   1     Order Specific Question:   Appointment in     Answer:   6 Weeks     Telephone number: [phone]    Signed By: Cindy Candelaria MD                                                                                                   Date: 8/15/2017 Time: 9:08 AM    ------------------------------------------------------------      Feeding Your [place]: After Your Child's Visit  Your Care Instructions  Feeding a  is an important concern for parents. Experts recommend that newborns be fed on demand. This means that you breast-feed or bottle-feed your infant whenever he or she shows signs of hunger, rather than setting a strict schedule. Newborns follow their feelings of hunger. They eat when they are hungry and stop eating when they are full. Most experts also recommend breast-feeding for at least the first year and giving only breast milk for the first 6 months. If you are unable to or choose not to breast-feed, feed your baby iron-fortified infant formula. A common concern for parents is whether their baby is eating enough. Talk to your doctor if you are concerned about how much your baby is eating. Most newborns lose weight in the first several days after birth but regain it within a week or two. After 3weeks of age, your baby should continue to gain weight steadily. Newborns younger than 2 weeks should have at least 1 or 2 bowel movements a day. Babies older than 2 weeks can go 2 days and sometimes longer between bowel movements. During the first few days, a  normally has at least 2 or 3 wet diapers a day. After that, your baby should have at least 6 to 8 wet diapers a day. Follow-up care is a key part of your child's treatment and safety. Be sure to make and go to all appointments, and call your doctor if your child is having problems.  It's also a good idea to know your child's test results and keep a list of the medicines your child takes. How can you care for your child at home? · Allow your baby to feed on demand. ¨ During the first few days or weeks, these feedings occur every 1 to 3 hours (about 8 to 12 feedings in a 24-hour period) for breast-fed babies. These early feedings may last only a few minutes. Over time, feeding sessions will become longer and may happen less often. ¨ Formula-fed babies may have slightly fewer feedings, about 6 to 10 every 24 hours. They will eat about 2 to 3 ounces every 3 to 4 hours during the first few weeks of life. ¨ By 2 months, most babies have a set feeding routine. But your baby's routine may change at times, such as during growth spurts when your baby may be hungry more often. · You may have to wake a sleepy baby to feed in the first few days after birth. · Do not give any milk other than breast milk or infant formula until your baby is 1 year of age. Cow's milk, goat's milk, and soy milk do not have the nutrients that very young babies need to grow and develop properly. Cow and goat milk are very hard for young babies to digest.  · Ask your doctor about giving a vitamin D supplement starting within the first few days after birth. · If you choose to switch your baby from the breast to bottle-feeding, try these tips:  ¨ Try letting your baby drink from a bottle. Slowly reduce the number of times you breast-feed each day. For a week, replace a breast-feeding with a bottle-feeding during one of your daily feeding times. ¨ Each week, choose one more breast-feeding time to replace or shorten. ¨ Offer the bottle before each breast-feeding. When should you call for help? Watch closely for changes in your child's health, and be sure to contact your doctor if:  · You have questions about feeding your baby. · You are concerned that your baby is not eating enough. · You have trouble feeding your baby. Where can you learn more?    Go to [employer].be  Enter B788 in the search box to learn more about \"Feeding Your : After Your Child's Visit. \"   © 9062-5280 Healthwise, Incorporated. Care instructions adapted under license by Tatianna Brown (which disclaims liability or warranty for this information). This care instruction is for use with your licensed healthcare professional. If you have questions about a medical condition or this instruction, always ask your healthcare professional. [place] [address] any warranty or liability for your use of this information. Content Version: 9.4.22059; Last Revised: 2011            Breast-Feeding: After Your Visit  Your Care Instructions    Breast-feeding has many benefits. It may lower your baby's chances of getting an infection. It also may prevent your baby from having problems such as diabetes and high cholesterol later in life. Breast-feeding also helps you bond with your baby. The American Academy of Pediatrics recommends breast-feeding for at least a year. That may be very hard for many women to do, but breast-feeding even for a shorter period of time is a health benefit to you and your baby. In the first days after birth, your breasts make a thick, yellow liquid called colostrum. This liquid gives your baby nutrients and antibodies against infection. It is all that babies need in the first days after birth. Your breasts will fill with milk a few days after the birth. Breast-feeding is a skill that gets better with practice. It is normal to have some problems. Some women have sore or cracked nipples, blocked milk ducts, or a breast infection (mastitis). But if you feed your baby every 1 to 2 hours during the day and use good breast-feeding methods, you may not have these problems. You can treat these problems if they happen and continue breast-feeding. Follow-up care is a key part of your treatment and safety.  Be sure to make and go to all appointments, and call your doctor if you are having problems. Its also a good idea to know your test results and keep a list of the medicines you take. How can you care for yourself at home? · Breast-feed your baby whenever he or she is hungry. In the first 2 weeks, your baby will feed about every 1 to 3 hours. This will help you keep up your supply of milk. · Put a bed pillow or a nursing pillow on your lap to support your arms and your baby. · Hold your baby in a comfortable position. ¨ You can hold your baby in several ways. One of the most common positions is the cradle hold. One arm supports your baby, with his or her head in the bend of your elbow. Your open hand supports your baby's bottom or back. Your baby's belly lies against yours. ¨ If you had your baby by , or , try the football hold. This position keeps your baby off your belly. Tuck your baby under your arm, with his or her body along the side you will be feeding on. Support your baby's upper body with your arm. With that hand you can control your baby's head to bring his or her mouth to your breast.  ¨ Try different positions with each feeding. If you are having problems, ask for help from your doctor or a lactation consultant. · To get your baby to latch on:  ¨ Support and narrow your breast with one hand using a \"U hold,\" with your thumb on the outer side of your breast and your fingers on the inner side. You can also use a \"C hold,\" with all your fingers below the nipple and your thumb above it. Try the different holds to get the deepest latch for whichever breast-feeding position you use. Your other arm is behind your baby's back, with your hand supporting the base of the baby's head. Position your fingers and thumb to point toward your baby's ears. ¨ You can touch your baby's lower lip with your nipple to get your baby to open his or her mouth. Wait until your baby opens up really wide, like a big yawn.  Then be sure to bring the baby quickly to your breast--not your breast to the baby. As you bring your baby toward your breast, use your other hand to support the breast and guide it into his or her mouth. ¨ Both the nipple and a large portion of the darker area around the nipple (areola) should be in the baby's mouth. The baby's lips should be flared outward, not folded in (inverted). ¨ Listen for a regular sucking and swallowing pattern while the baby is feeding. If you cannot see or hear a swallowing pattern, watch the baby's ears, which will wiggle slightly when the baby swallows. If the baby's nose appears to be blocked by your breast, tilt the baby's head back slightly, so just the edge of one nostril is clear for breathing. ¨ When your baby is latched, you can usually remove your hand from supporting your breast and bring it under your baby to cradle him or her. Now just relax and breast-feed your baby. · You will know that your baby is feeding well when:  ¨ His or her mouth covers a lot of the areola, and the lips are flared out. ¨ His or her chin and nose rest against your breast.  ¨ Sucking is deep and rhythmic, with short pauses. ¨ You are able to see and hear your baby swallowing. ¨ You do not feel pain in your nipple. · If your baby takes only one breast at a feeding, start the next feeding on the other breast.  · Anytime you need to remove your baby from the breast, put one finger in the corner of his or her mouth. Push your finger between your baby's gums to gently break the seal. If you do not break the tight seal before you remove your baby, your nipples can become sore, cracked, or bruised. · After feeding your baby, gently pat his or her back to let out any swallowed air. After your baby burps, offer the breast again, or offer the other breast. Sometimes a baby will want to keep feeding after being burped. When should you call for help?   Call your doctor now or seek immediate medical care if:  · You have problems with breast-feeding, such as:  ¨ Sore, red nipples. ¨ Stabbing or burning breast pain. ¨ A hard lump in your breast.  ¨ A fever, chills, or flu-like symptoms. Watch closely for changes in your health, and be sure to contact your doctor if:  · Your baby has trouble latching on to your breast.  · You continue to have pain or discomfort when breast-feeding. · Your baby wets fewer than 4 diapers a day. · You have other questions or concerns. Where can you learn more? Go to [employer].be  Enter P492 in the search box to learn more about \"Breast-Feeding: After Your Visit. \"   © 5541-2390 Healthwise, [employer]. Care instructions adapted under license by Memo Thomason (which disclaims liability or warranty for this information). This care instruction is for use with your licensed healthcare professional. If you have questions about a medical condition or this instruction, always ask your healthcare professional. [place] [address] any warranty or liability for your use of this information. Content Version: 9.4.76056; Last Revised: February 10, 2012        ----------------------------------------------------      Feeding Your Baby in the First Year: After Your Child's Visit  Your Care Instructions  Feeding a baby is an important concern for parents. Most experts recommend breast-feeding for at least the first year and giving only breast milk for the first 6 months. If you are unable to or choose not to breast-feed, feed your baby iron-fortified infant formula. Babies younger than 7 months of age can get all the nutrition and fluid they need from breast milk or infant formula. Experts also recommend that babies be fed on demand. This means that you breast-feed or bottle-feed your infant whenever he or she shows signs of hunger, rather than setting a strict schedule. Babies follow their feelings of hunger. They eat when they are hungry and stop eating when they are full.   Weaning is the process of switching your baby from breast-feeding to bottle-feeding, or from a breast or bottle to a cup or solid foods. Weaning usually works best when it is done gradually over several weeks, months, or even longer. There is no right or wrong time to wean. It depends on how ready you and your baby are to start. Follow-up care is a key part of your child's treatment and safety. Be sure to make and go to all appointments, and call your doctor if your child is having problems. It's also a good idea to know your child's test results and keep a list of the medicines your child takes. How can you care for your child at home? Babies younger than 6 months  · Allow your baby to feed on demand. ¨ During the first few days or weeks, these feedings occur every 1 to 3 hours (about 8 to 12 feedings in a 24-hour period) for breast-fed babies. These early feedings may last only a few minutes. Over time, feeding sessions will become longer and may happen less often. ¨ Formula-fed newborns may have slightly fewer feedings, about 6 to 10 every 24 hours. Most newborns will eat 2 to 3 ounces of formula every 3 to 4 hours during the first few weeks. By 10months of age, this increases to about 6 to 8 ounces 4 or 5 times a day. Most babies will drink about 2½ ounces a day for every pound of body weight. Ask your doctor about formula amounts. ¨ By 2 months, most babies have a set feeding routine. But your baby's routine may change at times, such as during growth spurts when your baby may be hungry more often. · Do not give any milk other than breast milk or infant formula until your baby is 1 year of age. Cow's milk, goat's milk, and soy milk do not have the nutrients that very young babies need to grow and develop properly. Cow and goat milk are very hard for young babies to digest.  · Ask your doctor about giving a vitamin D supplement starting within the first few days after birth.   Babies older than 6 months  · If you feel that you and your baby are ready, these tips may help you wean your baby from the breast to a cup or bottle:  ¨ Try letting your baby drink from a cup. If your baby is not ready, you can start by switching to a bottle. ¨ Slowly reduce the number of times you breast-feed each day. For a week, replace a breast-feeding with a cup-feeding or bottle-feeding during one of your daily feeding times. ¨ Each week, choose one more breast-feeding time to replace or shorten. ¨ Offer the cup or bottle before each breast-feeding. · Around 6 months, you can begin to add other foods besides breast milk or infant formula to your baby's diet. · Start with very soft foods, such as baby cereal. Iron-fortified, single-grain baby cereals are a good choice. · Introduce one new food at a time. This can help you know if your baby has an allergy to a certain food. You can introduce a new food every 2 to 3 days. · When giving solid foods, look for signs that your baby is still hungry or is full. Don't persist if your baby isn't interested in or doesn't like the food. · Keep offering breast milk or infant formula as part of your baby's diet until he or she is at least 3year old. When should you call for help? Watch closely for changes in your child's health, and be sure to contact your doctor if:  · You have questions about feeding your baby. · You are concerned that your baby is not eating enough. · You have trouble feeding your baby. Where can you learn more? Go to [employer].be  Enter Q717 in the search box to learn more about \"Feeding Your Baby in the First Year: After Your Child's Visit. \"   © 3425-0041 Healthwise, Incorporated. Care instructions adapted under license by Briseyda Merida (which disclaims liability or warranty for this information).  This care instruction is for use with your licensed healthcare professional. If you have questions about a medical condition or this instruction, always ask your healthcare professional. [employer], [employer] disclaims any warranty or liability for your use of this information. Content Version: 9.4.78424; Last Revised: June 16, 2011        Discussed eating a healthy diet. Instructed mother to eat a variety of foods in order to get a well balanced diet. She should consume an extra 300-500 calories per day (more than her non-pregnant requirement.) These extra calories will help provide energy needed for optimal breast milk production. Mother also encouraged to \"drink to thirst\" and it is recommended that she drink fluids such as water and fruit/vegetable juice. Nutritious snacks should be available so that she can eat throughout the day to help satisfy her hunger and maintain a good milk supply. Continue taking your prenatal vitamins as long as you breast feed. Chart shows numerous feedings, void, stool WNL. Discussed Importance of monitoring outputs and feedings on first week of  Breastfeeding. Discussed ways to tell if baby getting enough, ie  Voids and stools, by day 7, baby should have at least  4-6 wet diapers a day, change in color of stool to a seedy yellow, and return to birth wt within 2 weeks with a steady increase after that. .  Follow up with pediatrician visit for weight check in 1-2 days reviewed. Discussed Breast feeding support groups and encouraged to call [facility] line number, W4820585 or [facility] at 882-0217  for any questions/problems that arise. Encouraged mom to attempt feeding with baby led feeding cues. Just as sucking on fingers, rooting, mouthing. Looking for 8-12 feedings in 24 hours. Don't limit baby at breast, allow baby to come of breast on it's own. Baby may want to feed  often and may increase number of feedings on second day of life. Skin to skin encouraged. If baby doesn't nurse,  Mom should  Pump and give infant any expressed milk.   If not pumping any milk, mom should contact pediatrician for possible need for supplementation. Engorgement Care Guidelines:  Anticipatory guidance shared. Reviewed how milk is made and normal phases of milk production. Taught care of engorged breasts -and how to help. If mom should experience engorged breas frequent breastfeeding encouraged, cool packs and motrin as tolerated.   .      Call your doctor, midwife and/or lactation consultant if:   Kathy Cerda is having no wet or dirty diapers    Baby has dark colored urine after day 3  (should be pale yellow to clear)    Baby has dark colored stools after day 4  (should be mustard yellow, with no meconium)    Baby has fewer wet/soiled diapers or nurses less   frequently than the goals listed here    Mom has symptoms of mastitis   (sore breast with fever, chills, flu-like aching)

## 2017-08-15 NOTE — PROGRESS NOTES
Bedside and Verbal shift change report given to Phyllis Del Valle RN (oncoming nurse) by Abril Garcia RN (offgoing nurse). Report given with SBAR, Kardex, Intake/Output and MAR.

## 2017-08-15 NOTE — DISCHARGE SUMMARY
Obstetrical Discharge Summary     Name: Wendy Sanderson MRN: [id number]  SSN: [id number]    YOB: 1985  Age: 28 y.o. Sex: female      Admit Date: 2017    Discharge Date: 8/15/2017     Admitting Physician: Rom Gallo MD     Attending Physician:  Beverlyn Gitelman, MD     Admission Diagnoses: MATERNITY  Normal labor  Abnormal  test    Procedure Performed:  * No surgery found *  Surgical     Used for misc procedures    Discharge Diagnoses:   Information for the patient's :  Nguyễn Castillo, Male [[id number]]   Delivery of a 3.1 kg male infant via Vaginal, Spontaneous Delivery on 2017 at 1:34 AM  by . Apgars were 8 and 9. Additional Diagnoses:   Hospital Problems  Never Reviewed          Codes Class Noted POA    Abnormal  test ICD-10-CM: O28.9  ICD-9-CM: 796.5  2017 Unknown        Normal labor ICD-10-CM: O80, Z37.9  ICD-9-CM: 654  2017 Unknown             Lab Results   Component Value Date/Time    Rubella, External 14.5 imm 2017    GrBStrep, External neg 2017       Hospital Course: Normal hospital course following the delivery. Patient Disposition: Home      Followup Care:  Discharge Condition: stable  Activity as tolerated and No lifting, Driving, or Strenuous exercise for 6 weeks  Regular Diet  Keep wound clean and dry    Patient Instructions:   Current Discharge Medication List      START taking these medications    Details   HYDROcodone-acetaminophen (NORCO) 7.5-325 mg per tablet Take 1 Tab by mouth every six (6) hours as needed. Max Daily Amount: 4 Tabs. Qty: 8 Tab, Refills: 0      ibuprofen (MOTRIN) 800 mg tablet Take 1 Tab by mouth every eight (8) hours as needed for Pain. Qty: 40 Tab, Refills: 1      docusate sodium (COLACE) 100 mg capsule Take 1 Cap by mouth two (2) times daily as needed for Constipation for up to 90 days. Qty: 30 Cap, Refills: 1             Reference my discharge instructions.     Follow-up Appointments   Procedures    FOLLOW UP VISIT Appointment in: 6 Weeks     Standing Status:   Standing     Number of Occurrences:   1     Order Specific Question:   Appointment in     Answer:   6 Weeks        Signed By:  James Musa MD     August 15, 2017

## 2018-02-12 ENCOUNTER — HOSPITAL ENCOUNTER (EMERGENCY)
Age: 33
Discharge: HOME | End: 2018-02-12
Attending: FAMILY MEDICINE

## 2018-02-12 ENCOUNTER — APPOINTMENT (OUTPATIENT)
Dept: GENERAL RADIOLOGY | Age: 33
End: 2018-02-12
Attending: FAMILY MEDICINE

## 2018-02-12 VITALS
HEART RATE: 65 BPM | DIASTOLIC BLOOD PRESSURE: 68 MMHG | WEIGHT: 148 LBS | RESPIRATION RATE: 16 BRPM | BODY MASS INDEX: 22.43 KG/M2 | OXYGEN SATURATION: 100 % | HEIGHT: 68 IN | TEMPERATURE: 98.2 F | SYSTOLIC BLOOD PRESSURE: 117 MMHG

## 2018-02-12 DIAGNOSIS — M25.561 ACUTE PAIN OF RIGHT KNEE: Primary | ICD-10-CM

## 2018-02-12 RX ORDER — PREDNISONE 10 MG/1
TABLET ORAL
Qty: 21 TAB | Refills: 0 | Status: SHIPPED | OUTPATIENT
Start: 2018-02-12 | End: 2018-04-12 | Stop reason: ALTCHOICE

## 2018-02-13 NOTE — UC PROVIDER NOTE
Patient is a 28 y.o. female presenting with knee pain. The history is provided by the patient. Knee Pain    This is a new problem. The current episode started more than 1 week ago. The problem occurs daily. The problem has not changed since onset. The pain is present in the right knee (inner side). The pain is at a severity of 4/10. The pain is moderate. Pertinent negatives include full range of motion and no stiffness. Associated symptoms comments: Local; swelling on inner knee and local soreness. The symptoms are aggravated by activity and movement. She has tried nothing for the symptoms. There has been no history of extremity trauma. History reviewed. No pertinent past medical history. History reviewed. No pertinent surgical history. History reviewed. No pertinent family history. Social History     Social History    Marital status:      Spouse name: N/A    Number of children: N/A    Years of education: N/A     Occupational History    Not on file. Social History Main Topics    Smoking status: Never Smoker    Smokeless tobacco: Never Used    Alcohol use Not on file    Drug use: Not on file    Sexual activity: Not on file     Other Topics Concern    Not on file     Social History Narrative                ALLERGIES: Penicillin g    Review of Systems   Musculoskeletal: Negative for stiffness. All other systems reviewed and are negative. Vitals:    02/12/18 1947   BP: 117/68   Pulse: 65   Resp: 16   Temp: 98.2 °F (36.8 °C)   SpO2: 100%   Weight: 67.1 kg (148 lb)   Height: 5' 8\" (1.727 m)       Physical Exam   Constitutional: No distress. Musculoskeletal:        Right hip: Normal.        Right knee: She exhibits swelling ( Local; swelling on inner knee and local soreness). She exhibits normal range of motion, no effusion and no bony tenderness. Tenderness found. MCL tenderness noted. No patellar tendon tenderness noted.         Right ankle: Normal.   Nursing note and vitals reviewed. MDM     Differential Diagnosis; Clinical Impression; Plan:     CLINICAL IMPRESSION:  Acute pain of right knee  (primary encounter diagnosis)      DDX    Plan:    Xray could not done since she couln't give urine sample to r/o pregnancy    Ice- self exercise  Aleve-    If sxs not resolved in 2 weeks - follow with ortho  Amount and/or Complexity of Data Reviewed:    Review and summarize past medical records:  Yes  Risk of Significant Complications, Morbidity, and/or Mortality:   Presenting problems: Moderate  Management options:   Moderate  Progress:   Patient progress:  Stable      Procedures

## 2018-02-13 NOTE — DISCHARGE INSTRUCTIONS
Joint Pain: Care Instructions  Your Care Instructions    Many people have small aches and pains from overuse or injury to muscles and joints. Joint injuries often happen during sports or recreation, work tasks, or projects around the home. An overuse injury can happen when you put too much stress on a joint or when you do an activity that stresses the joint over and over, such as using the computer or rowing a boat. You can take action at home to help your muscles and joints get better. You should feel better in 1 to 2 weeks, but it can take 3 months or more to heal completely. Follow-up care is a key part of your treatment and safety. Be sure to make and go to all appointments, and call your doctor if you are having problems. It's also a good idea to know your test results and keep a list of the medicines you take. How can you care for yourself at home? · Do not put weight on the injured joint for at least a day or two. · For the first day or two after an injury, do not take hot showers or baths, and do not use hot packs. The heat could make swelling worse. · Put ice or a cold pack on the sore joint for 10 to 20 minutes at a time. Try to do this every 1 to 2 hours for the next 3 days (when you are awake) or until the swelling goes down. Put a thin cloth between the ice and your skin. · Wrap the injury in an elastic bandage. Do not wrap it too tightly because this can cause more swelling. · Prop up the sore joint on a pillow when you ice it or anytime you sit or lie down during the next 3 days. Try to keep it above the level of your heart. This will help reduce swelling. · Take an over-the-counter pain medicine, such as acetaminophen (Tylenol), ibuprofen (Advil, Motrin), or naproxen (Aleve). Read and follow all instructions on the label. · After 1 or 2 days of rest, begin moving the joint gently.  While the joint is still healing, you can begin to exercise using activities that do not strain or hurt the painful joint. When should you call for help? Call your doctor now or seek immediate medical care if:  ? · You have signs of infection, such as:  ¨ Increased pain, swelling, warmth, and redness. ¨ Red streaks leading from the joint. ¨ A fever. ? Watch closely for changes in your health, and be sure to contact your doctor if:  ? · Your movement or symptoms are not getting better after 1 to 2 weeks of home treatment. Where can you learn more? Go to http://valerie-mariana.info/. Enter P205 in the search box to learn more about \"Joint Pain: Care Instructions. \"  Current as of: March 21, 2017  Content Version: 11.4  © 9037-1781 [employer]. Care instructions adapted under license by [employer] (which disclaims liability or warranty for this information). If you have questions about a medical condition or this instruction, always ask your healthcare professional. [place] [address] any warranty or liability for your use of this information. Knee: Exercises  Your Care Instructions  Here are some examples of exercises for your knee. Start each exercise slowly. Ease off the exercise if you start to have pain. Your doctor or physical therapist will tell you when you can start these exercises and which ones will work best for you. How to do the exercises  Quad sets    1. Sit with your leg straight and supported on the floor or a firm bed. (If you feel discomfort in the front or back of your knee, place a small towel roll under your knee.)  2. Tighten the muscles on top of your thigh by pressing the back of your knee flat down to the floor. (If you feel discomfort under your kneecap, place a small towel roll under your knee.)  3. Hold for about 6 seconds, then rest for up to 10 seconds. 4. Do 8 to 12 repetitions several times a day. Straight-leg raises to the front    1.  Lie on your back with your good knee bent so that your foot rests flat on the floor. Your injured leg should be straight. Make sure that your low back has a normal curve. You should be able to slip your flat hand in between the floor and the small of your back, with your palm touching the floor and your back touching the back of your hand. 2. Tighten the thigh muscles in the injured leg by pressing the back of your knee flat down to the floor. Hold your knee straight. 3. Keeping the thigh muscles tight, lift your injured leg up so that your heel is about 12 inches off the floor. Hold for about 6 seconds and then lower slowly. 4. Do 8 to 12 repetitions, 3 times a day. Straight-leg raises to the outside    1. Lie on your side, with your injured leg on top. 2. Tighten the front thigh muscles of your injured leg to keep your knee straight. 3. Keep your hip and your leg straight in line with the rest of your body, and keep your knee pointing forward. Do not drop your hip back. 4. Lift your injured leg straight up toward the ceiling, about 12 inches off the floor. Hold for about 6 seconds, then slowly lower your leg. 5. Do 8 to 12 repetitions. Straight-leg raises to the back    1. Lie on your stomach, and lift your leg straight up behind you (toward the ceiling). 2. Lift your toes about 6 inches off the floor, hold for about 6 seconds, then lower slowly. 3. Do 8 to 12 repetitions. Straight-leg raises to the inside    1. Lie on the side of your body with the injured leg. 2. You can either prop your other (good) leg up on a chair, or you can bend your good knee and put that foot in front of your injured knee. Do not drop your hip back. 3. Tighten the muscles on the front of your thigh to straighten your injured knee. 4. Keep your kneecap pointing forward, and lift your whole leg up toward the ceiling about 6 inches. Hold for about 6 seconds, then lower slowly. 5. Do 8 to 12 repetitions. Heel dig bridging    1.  Lie on your back with both knees bent and your ankles bent so that only your heels are digging into the floor. Your knees should be bent about 90 degrees. 2. Then push your heels into the floor, squeeze your buttocks, and lift your hips off the floor until your shoulders, hips, and knees are all in a straight line. 3. Hold for about 6 seconds as you continue to breathe normally, and then slowly lower your hips back down to the floor and rest for up to 10 seconds. 4. Do 8 to 12 repetitions. Hamstring curls    1. Lie on your stomach with your knees straight. If your kneecap is uncomfortable, roll up a washcloth and put it under your leg just above your kneecap. 2. Lift the foot of your injured leg by bending the knee so that you bring the foot up toward your buttock. If this motion hurts, try it without bending your knee quite as far. This may help you avoid any painful motion. 3. Slowly lower your leg back to the floor. 4. Do 8 to 12 repetitions. 5. With permission from your doctor or physical therapist, you may also want to add a cuff weight to your ankle (not more than 5 pounds). With weight, you do not have to lift your leg more than 12 inches to get a hamstring workout. Shallow standing knee bends    Do this exercise only if you have very little pain; if you have no clicking, locking, or giving way if you have an injured knee; and if it does not hurt while you are doing 8 to 12 repetitions. 1. Stand with your hands lightly resting on a counter or chair in front of you. Put your feet shoulder-width apart. 2. Slowly bend your knees so that you squat down like you are going to sit in a chair. Make sure your knees do not go in front of your toes. 3. Lower yourself about 6 inches. Your heels should remain on the floor at all times. 4. Rise slowly to a standing position. Heel raises    1. Stand with your feet a few inches apart, with your hands lightly resting on a counter or chair in front of you.   2. Slowly raise your heels off the floor while keeping your knees straight. 3. Hold for about 6 seconds, then slowly lower your heels to the floor. 4. Do 8 to 12 repetitions several times during the day. Follow-up care is a key part of your treatment and safety. Be sure to make and go to all appointments, and call your doctor if you are having problems. It's also a good idea to know your test results and keep a list of the medicines you take. Where can you learn more? Go to http://valerie-mariana.info/. Enter A294 in the search box to learn more about \"Knee: Exercises. \"  Current as of: March 21, 2017  Content Version: 11.4  © 5634-3459 [employer], [employer]. Care instructions adapted under license by [employer] (which disclaims liability or warranty for this information). If you have questions about a medical condition or this instruction, always ask your healthcare professional. [address] any warranty or liability for your use of this information.

## 2018-04-12 ENCOUNTER — OFFICE VISIT (OUTPATIENT)
Dept: INTERNAL MEDICINE CLINIC | Facility: CLINIC | Age: 33
End: 2018-04-12

## 2018-04-12 VITALS
WEIGHT: 145 LBS | TEMPERATURE: 98.5 F | SYSTOLIC BLOOD PRESSURE: 100 MMHG | OXYGEN SATURATION: 97 % | HEART RATE: 71 BPM | RESPIRATION RATE: 16 BRPM | DIASTOLIC BLOOD PRESSURE: 65 MMHG | BODY MASS INDEX: 21.98 KG/M2 | HEIGHT: 68 IN

## 2018-04-12 DIAGNOSIS — Z76.89 ENCOUNTER TO ESTABLISH CARE: Primary | ICD-10-CM

## 2018-04-12 DIAGNOSIS — M25.561 PAIN IN BOTH KNEES, UNSPECIFIED CHRONICITY: ICD-10-CM

## 2018-04-12 DIAGNOSIS — Z00.00 ROUTINE GENERAL MEDICAL EXAMINATION AT HEALTH CARE FACILITY: ICD-10-CM

## 2018-04-12 DIAGNOSIS — M25.562 PAIN IN BOTH KNEES, UNSPECIFIED CHRONICITY: ICD-10-CM

## 2018-04-12 RX ORDER — NAPROXEN SODIUM 220 MG
220 TABLET ORAL
COMMUNITY
End: 2019-08-02

## 2018-04-12 RX ORDER — MELOXICAM 7.5 MG/1
7.5 TABLET ORAL DAILY
Qty: 10 TAB | Refills: 0 | Status: SHIPPED | OUTPATIENT
Start: 2018-04-12 | End: 2018-04-22

## 2018-04-12 NOTE — PROGRESS NOTES
CC:  Chief Complaint   Patient presents with   [address] [place] [address] Rd  Akbar Ellis is a 28 y.o. female. Presents to establish care. She has no chronic medical problems except she was once told she had a heart murmur. Today she complains of a  2 month history of bilateral knee pain and swelling, worse at right knee. Was seen at the [address] [place] [address] [facility] 2 months ago. Was diagnosed with synovitis versus MCL strain. Instructed to take Aleve. Aleve helped with pain but she still has swelling. Located at right medial and left lateral knee, 1-2/10 in pain severity, pain sharp in character, intermittent, worse with standing. Denies history of trauma or injury to knee; also denies history of previous new problems. Soc Hx  . Has 2 children (4yo and 7 month old son). 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 Her job is mostly sedentary. Never smoker. Drinks occasional alcohol. Denies recreational drug use. Does not get regular exercise. Health Maintenance  Flu vaccine: declined      Tetanus vaccine:  Tdap 8/12/17  Pap smear: has an appt next month, Dr. Sathish Bangura    Lipids: will check today  A1c: will check today  Advanced Directives: given information today  End of Life: given information today      ROS  Constitutional: negative for fevers, chills, night sweats, fatigue, weight loss  Eyes:   negative for visual disturbance and irritation  ENT:   negative for tinnitus, sore throat, nasal congestion, ear pains, hoarseness  Respiratory:  negative for cough, hemoptysis, dyspnea, wheezing  CV:   negative for chest pain, palpitations, lower extremity edema  GI:   negative for heartburn, abd pain, nausea, vomiting, diarrhea, constipation  Endo:               negative for polyuria, polydipsia, polyphagia, cold/heat intolerance  Genitourinary: negative for frequency, dysuria, hematuria  Integument:  negative for rash and pruritus  Hematologic:  negative for easy bruising and gum/nose bleeding  Musculoskel: negative for myalgias, back pain, muscle weakness  Neurological:  negative for headaches, dizziness, gait problems  Behavl/Psych: negative for feelings of anxiety, depression, mood change    Patient Active Problem List   Diagnosis Code    Normal labor O80, Z37.9    Abnormal  test O28.9     History reviewed. No pertinent past medical history. History reviewed. No pertinent surgical history. Social History     Social History    Marital status:      Spouse name: N/A    Number of children: N/A    Years of education: N/A     Social History Main Topics    Smoking status: Never Smoker    Smokeless tobacco: Never Used    Alcohol use Yes      Comment: occ social    Drug use: No    Sexual activity: Not Asked     Other Topics Concern    None     Social History Narrative     Family History   Problem Relation Age of Onset    Other Mother      high cholesterol    Hypertension Father     Diabetes Father     Other Father      CHF    Cancer Other      lung     Allergies   Allergen Reactions    Penicillin G Hives     childhood     Current Outpatient Prescriptions   Medication Sig Dispense Refill    naproxen sodium (ALEVE) 220 mg tablet Take 220 mg by mouth daily as needed. PHYSICAL EXAM  Visit Vitals    /65 (BP 1 Location: Left arm, BP Patient Position: Sitting)    Pulse 71    Temp 98.5 °F (36.9 °C) (Oral)    Resp 16    Ht 5' 8\" (1.727 m)    Wt 145 lb (65.8 kg)    SpO2 97%    BMI 22.05 kg/m2       General: Well-developed, well-nourished. In no distress. Appears stated age. Alert and oriented to person, place, and time. HEENT: Normocephalic, atraumatic. Conjunctiva clear. Pupils equal, round, reactive to light. Extraocular movements intact. TM's and ear canals normal bilaterally. Normal nasal mucosa. No drainage or sinus tenderness. Oropharynx benign.     Neck: Supple, symmetrical, trachea midline, no lymphadenopathy, no carotid bruits, no JVD, thyroid: not enlarged, symmetric, no tenderness/mass/nodules. Lungs: Clear to auscultation bilaterally. No crackles or wheezes. Chest Wall: No tenderness or deformity. Heart: RRR, normal S1 and S2, no murmur, click, rub, or gallop. Back: Symmetric, no curvature. ROM normal. No CVA tenderness. Abdomen: Soft, non-distended, bowel sounds normal. No tenderness. No masses. No hepatosplenomegaly. Lymph nodes: Cervical and supraclavicular nodes normal.  Extremities: No clubbing, cyanosis, or edema. Skin: Skin color, texture, turgor normal. No rashes or lesions. Pulses: 2+ and symmetric at dorsalis pedis and posterior tibialis pulses. Neurological: CN II-XII intact. Normal strength, sensation, and reflexes throughout. Musculoskeletal: Gait normal. Mild soft tissue swelling at medial aspect of right knee; no erythema, warmth, or joint effusion. ROM normal at both knees. Psychiatric: Normal mood and affect. Behavior is normal.          ASSESSMENT AND PLAN    ICD-10-CM ICD-9-CM    1. Encounter to establish care Z76.89 V65.8    2. Routine general medical examination at [facility] care facility Z00.00 V70.0 LIPID PANEL      METABOLIC PANEL, COMPREHENSIVE      CBC WITH AUTOMATED DIFF      HEMOGLOBIN A1C WITH EAG      TSH RFX ON ABNORMAL TO FREE T4   3. Pain in both knees, unspecified chronicity M25.561 719.46 SED RATE (ESR)    M25.562  RA + CCP ABS      LUTHER W/REFLEX      meloxicam (MOBIC) 7.5 mg tablet       Diagnoses and all orders for this visit:    1. Encounter to establish care  Medical records from [facility] reviewed. 2. Routine general medical examination at health care facility  -     LIPID PANEL  -     METABOLIC PANEL, COMPREHENSIVE  -     CBC WITH AUTOMATED DIFF  -     HEMOGLOBIN A1C WITH EAG  -     TSH RFX ON ABNORMAL TO FREE T4    3. Pain in both knees, unspecified chronicity  Subacute course. Rule out inflammatory arthritis.  Also consider chronic osteochondral defect. Consider X-ray if pain/swelling persists. -     SED RATE (ESR)  -     RA + CCP ABS  -     LUTHER W/REFLEX  -     Start meloxicam (MOBIC) 7.5 mg tablet; Take 1 Tab by mouth daily for 10 days. For knee pain. Do not take Aleve while on this medication. Follow-up Disposition:  Return in about 3 months (around 7/12/2018), or if symptoms worsen or fail to improve, for Knee pain. Provided patient and/or family with advanced directive information and answered pertinent questions. Encouraged patient to provide a copy of advanced directive to the office when available. I have discussed the diagnosis with the patient and the intended plan as seen in the above orders. Patient is in agreement. The patient has received an after-visit summary and questions were answered concerning future plans. I have discussed medication side effects and warnings with the patient as well.

## 2018-04-12 NOTE — PATIENT INSTRUCTIONS
Knee: Exercises  Your Care Instructions  Here are some examples of exercises for your knee. Start each exercise slowly. Ease off the exercise if you start to have pain. Your doctor or physical therapist will tell you when you can start these exercises and which ones will work best for you. How to do the exercises  Quad sets    1. Sit with your leg straight and supported on the floor or a firm bed. (If you feel discomfort in the front or back of your knee, place a small towel roll under your knee.)  2. Tighten the muscles on top of your thigh by pressing the back of your knee flat down to the floor. (If you feel discomfort under your kneecap, place a small towel roll under your knee.)  3. Hold for about 6 seconds, then rest for up to 10 seconds. 4. Do 8 to 12 repetitions several times a day. Straight-leg raises to the front    1. Lie on your back with your good knee bent so that your foot rests flat on the floor. Your injured leg should be straight. Make sure that your low back has a normal curve. You should be able to slip your flat hand in between the floor and the small of your back, with your palm touching the floor and your back touching the back of your hand. 2. Tighten the thigh muscles in the injured leg by pressing the back of your knee flat down to the floor. Hold your knee straight. 3. Keeping the thigh muscles tight, lift your injured leg up so that your heel is about 12 inches off the floor. Hold for about 6 seconds and then lower slowly. 4. Do 8 to 12 repetitions, 3 times a day. Straight-leg raises to the outside    1. Lie on your side, with your injured leg on top. 2. Tighten the front thigh muscles of your injured leg to keep your knee straight. 3. Keep your hip and your leg straight in line with the rest of your body, and keep your knee pointing forward. Do not drop your hip back. 4. Lift your injured leg straight up toward the ceiling, about 12 inches off the floor.  Hold for about 6 seconds, then slowly lower your leg. 5. Do 8 to 12 repetitions. Straight-leg raises to the back    1. Lie on your stomach, and lift your leg straight up behind you (toward the ceiling). 2. Lift your toes about 6 inches off the floor, hold for about 6 seconds, then lower slowly. 3. Do 8 to 12 repetitions. Straight-leg raises to the inside    1. Lie on the side of your body with the injured leg. 2. You can either prop your other (good) leg up on a chair, or you can bend your good knee and put that foot in front of your injured knee. Do not drop your hip back. 3. Tighten the muscles on the front of your thigh to straighten your injured knee. 4. Keep your kneecap pointing forward, and lift your whole leg up toward the ceiling about 6 inches. Hold for about 6 seconds, then lower slowly. 5. Do 8 to 12 repetitions. Heel dig bridging    1. Lie on your back with both knees bent and your ankles bent so that only your heels are digging into the floor. Your knees should be bent about 90 degrees. 2. Then push your heels into the floor, squeeze your buttocks, and lift your hips off the floor until your shoulders, hips, and knees are all in a straight line. 3. Hold for about 6 seconds as you continue to breathe normally, and then slowly lower your hips back down to the floor and rest for up to 10 seconds. 4. Do 8 to 12 repetitions. Hamstring curls    1. Lie on your stomach with your knees straight. If your kneecap is uncomfortable, roll up a washcloth and put it under your leg just above your kneecap. 2. Lift the foot of your injured leg by bending the knee so that you bring the foot up toward your buttock. If this motion hurts, try it without bending your knee quite as far. This may help you avoid any painful motion. 3. Slowly lower your leg back to the floor. 4. Do 8 to 12 repetitions.   5. With permission from your doctor or physical therapist, you may also want to add a cuff weight to your ankle (not more than 5 pounds). With weight, you do not have to lift your leg more than 12 inches to get a hamstring workout. Shallow standing knee bends    Do this exercise only if you have very little pain; if you have no clicking, locking, or giving way if you have an injured knee; and if it does not hurt while you are doing 8 to 12 repetitions. 1. Stand with your hands lightly resting on a counter or chair in front of you. Put your feet shoulder-width apart. 2. Slowly bend your knees so that you squat down like you are going to sit in a chair. Make sure your knees do not go in front of your toes. 3. Lower yourself about 6 inches. Your heels should remain on the floor at all times. 4. Rise slowly to a standing position. Heel raises    1. Stand with your feet a few inches apart, with your hands lightly resting on a counter or chair in front of you. 2. Slowly raise your heels off the floor while keeping your knees straight. 3. Hold for about 6 seconds, then slowly lower your heels to the floor. 4. Do 8 to 12 repetitions several times during the day. Follow-up care is a key part of your treatment and safety. Be sure to make and go to all appointments, and call your doctor if you are having problems. It's also a good idea to know your test results and keep a list of the medicines you take. Where can you learn more? Go to http://valerie-mariana.info/. Enter I836 in the search box to learn more about \"Knee: Exercises. \"  Current as of: March 21, 2017  Content Version: 11.4  © 2850-7119 Healthwise[employer]. Care instructions adapted under license by [employer] (which disclaims liability or warranty for this information). If you have questions about a medical condition or this instruction, always ask your healthcare professional. [address] any warranty or liability for your use of this information.

## 2018-04-12 NOTE — MR AVS SNAPSHOT
[address] [address] [phone] Patient: Radha Trimble MRN: [id number] VKV:[id number] Visit Information Date & Time Provider Department Dept. Phone Encounter #  
 4/12/2018  8:30 AM Yasmin Flores MD [facility] [address] [id number] Follow-up Instructions Return in about 3 months (around 7/12/2018), or if symptoms worsen or fail to improve, for Knee pain. Upcoming Health Maintenance Date Due  
 PAP AKA CERVICAL CYTOLOGY 4/14/2006 DTaP/Tdap/Td series (1 - Tdap) 7/20/2018* Influenza Age 5 to Adult 9/1/2018* *Topic was postponed. The date shown is not the original due date. Allergies as of 4/12/2018  Review Complete On: 4/12/2018 By: Yasmin Flores MD  
  
 Severity Noted Reaction Type Reactions Penicillin G  08/12/2017    Hives  
 childhood Current Immunizations  Never Reviewed No immunizations on file. Not reviewed this visit You Were Diagnosed With   
  
 Codes Comments Encounter to establish care    -  Primary ICD-10-CM: Z76.89 
ICD-9-CM: V65.8 Routine general medical examination at health care facility     ICD-10-CM: Z00.00 ICD-9-CM: V70.0 Pain in both knees, unspecified chronicity     ICD-10-CM: M25.561, M25.562 ICD-9-CM: 719.46 Vitals BP Pulse Temp Resp Height(growth percentile) Weight(growth percentile) 100/65 (BP 1 Location: Left arm, BP Patient Position: Sitting) 71 98.5 °F (36.9 °C) (Oral) 16 5' 8\" (1.727 m) 145 lb (65.8 kg) SpO2 BMI OB Status Smoking Status 97% 22.05 kg/m2 Breastfeeding Never Smoker BMI and BSA Data Body Mass Index Body Surface Area 22.05 kg/m 2 1.78 m 2 Preferred Pharmacy Pharmacy Name Phone [id number] [address] [address] [address] [phone] Your Updated Medication List  
  
   
 This list is accurate as of 4/12/18  9:38 AM.  Always use your most recent med list.  
  
  
  
  
 ALEVE 220 mg tablet Generic drug:  naproxen sodium Take 220 mg by mouth daily as needed. meloxicam 7.5 mg tablet Commonly known as:  MOBIC Take 1 Tab by mouth daily for 10 days. For knee pain. Do not take Aleve while on this medication. Prescriptions Sent to Pharmacy Refills  
 meloxicam (MOBIC) 7.5 mg tablet 0 Sig: Take 1 Tab by mouth daily for 10 days. For knee pain. Do not take Aleve while on this medication. Class: Normal  
 Pharmacy: [address] [place] [address] , [address] [place] [address] Ph #: [phone] Route: Oral  
  
We Performed the Following LUTHER W/REFLEX [05348 CPT(R)] CBC WITH AUTOMATED DIFF [17359 CPT(R)] HEMOGLOBIN A1C WITH EAG [05304 CPT(R)] LIPID PANEL [33749 CPT(R)] METABOLIC PANEL, COMPREHENSIVE [75779 CPT(R)]   
 RA + CCP ABS [[id number] Custom] SED RATE (ESR) C287823 CPT(R)] TSH RFX ON ABNORMAL TO FREE T4 [[id number] Custom] Follow-up Instructions Return in about 3 months (around 7/12/2018), or if symptoms worsen or fail to improve, for Knee pain. Patient Instructions Knee: Exercises Your Care Instructions Here are some examples of exercises for your knee. Start each exercise slowly. Ease off the exercise if you start to have pain. Your doctor or physical therapist will tell you when you can start these exercises and which ones will work best for you. How to do the exercises [employer] 1. Sit with your leg straight and supported on the floor or a firm bed. (If you feel discomfort in the front or back of your knee, place a small towel roll under your knee.) 2. Tighten the muscles on top of your thigh by pressing the back of your knee flat down to the floor. (If you feel discomfort under your kneecap, place a small towel roll under your knee.) 3. Hold for about 6 seconds, then rest for up to 10 seconds. 4. Do 8 to 12 repetitions several times a day. Straight-leg raises to the front 1. Lie on your back with your good knee bent so that your foot rests flat on the floor. Your injured leg should be straight. Make sure that your low back has a normal curve. You should be able to slip your flat hand in between the floor and the small of your back, with your palm touching the floor and your back touching the back of your hand. 2. Tighten the thigh muscles in the injured leg by pressing the back of your knee flat down to the floor. Hold your knee straight. 3. Keeping the thigh muscles tight, lift your injured leg up so that your heel is about 12 inches off the floor. Hold for about 6 seconds and then lower slowly. 4. Do 8 to 12 repetitions, 3 times a day. Straight-leg raises to the outside 1. Lie on your side, with your injured leg on top. 2. Tighten the front thigh muscles of your injured leg to keep your knee straight. 3. Keep your hip and your leg straight in line with the rest of your body, and keep your knee pointing forward. Do not drop your hip back. 4. Lift your injured leg straight up toward the ceiling, about 12 inches off the floor. Hold for about 6 seconds, then slowly lower your leg. 5. Do 8 to 12 repetitions. Straight-leg raises to the back 1. Lie on your stomach, and lift your leg straight up behind you (toward the ceiling). 2. Lift your toes about 6 inches off the floor, hold for about 6 seconds, then lower slowly. 3. Do 8 to 12 repetitions. Straight-leg raises to the inside 1. Lie on the side of your body with the injured leg. 2. You can either prop your other (good) leg up on a chair, or you can bend your good knee and put that foot in front of your injured knee. Do not drop your hip back. 3. Tighten the muscles on the front of your thigh to straighten your injured knee. 4. Keep your kneecap pointing forward, and lift your whole leg up toward the ceiling about 6 inches. Hold for about 6 seconds, then lower slowly. 5. Do 8 to 12 repetitions. Heel dig bridging 1. Lie on your back with both knees bent and your ankles bent so that only your heels are digging into the floor. Your knees should be bent about 90 degrees. 2. Then push your heels into the floor, squeeze your buttocks, and lift your hips off the floor until your shoulders, hips, and knees are all in a straight line. 3. Hold for about 6 seconds as you continue to breathe normally, and then slowly lower your hips back down to the floor and rest for up to 10 seconds. 4. Do 8 to 12 repetitions. Hamstring curls 1. Lie on your stomach with your knees straight. If your kneecap is uncomfortable, roll up a washcloth and put it under your leg just above your kneecap. 2. Lift the foot of your injured leg by bending the knee so that you bring the foot up toward your buttock. If this motion hurts, try it without bending your knee quite as far. This may help you avoid any painful motion. 3. Slowly lower your leg back to the floor. 4. Do 8 to 12 repetitions. 5. With permission from your doctor or physical therapist, you may also want to add a cuff weight to your ankle (not more than 5 pounds). With weight, you do not have to lift your leg more than 12 inches to get a hamstring workout. Shallow standing knee bends Do this exercise only if you have very little pain; if you have no clicking, locking, or giving way if you have an injured knee; and if it does not hurt while you are doing 8 to 12 repetitions. 1. Stand with your hands lightly resting on a counter or chair in front of you. Put your feet shoulder-width apart. 2. Slowly bend your knees so that you squat down like you are going to sit in a chair. Make sure your knees do not go in front of your toes. 3. Lower yourself about 6 inches. Your heels should remain on the floor at all times. 4. Rise slowly to a standing position. Heel raises 1. Stand with your feet a few inches apart, with your hands lightly resting on a counter or chair in front of you. 2. Slowly raise your heels off the floor while keeping your knees straight. 3. Hold for about 6 seconds, then slowly lower your heels to the floor. 4. Do 8 to 12 repetitions several times during the day. Follow-up care is a key part of your treatment and safety. Be sure to make and go to all appointments, and call your doctor if you are having problems. It's also a good idea to know your test results and keep a list of the medicines you take. Where can you learn more? Go to http://valerie-mariana.info/. Enter V012 in the search box to learn more about \"Knee: Exercises. \" Current as of: March 21, 2017 Content Version: 11.4 © 4032-2680 [employer]. Care instructions adapted under license by [employer] (which disclaims liability or warranty for this information). If you have questions about a medical condition or this instruction, always ask your healthcare professional. [address] any warranty or liability for your use of this information. Introducing [facility]! Staci Tarango introduces [employer]ed patient portal. Now you can access parts of your medical record, email your doctor's office, and request medication refills online. 1. In your internet browser, go to [email or web address] 2. Click on the First Time User? Click Here link in the Sign In box. You will see the New Member Sign Up page. 3. Enter your A[employer]ed Access Code exactly as it appears below. You will not need to use this code after youve completed the sign-up process. If you do not sign up before the expiration date, you must request a new code.  
 
· [employer] Access Code: [email or web address] 
 Expires: 5/13/2018  8:17 PM 
 
4. Enter the last four digits of your Social Security Number (xxxx) and Date of Birth (mm/dd/yyyy) as indicated and click Submit. You will be taken to the next sign-up page. 5. Create a MolecularMD ID. This will be your MolecularMD login ID and cannot be changed, so think of one that is secure and easy to remember. 6. Create a MolecularMD password. You can change your password at any time. 7. Enter your Password Reset Question and Answer. This can be used at a later time if you forget your password. 8. Enter your e-mail address. You will receive e-mail notification when new information is available in [address] 9. Click Sign Up. You can now view and download portions of your medical record. 10. Click the Download Summary menu link to download a portable copy of your medical information. If you have questions, please visit the Frequently Asked Questions section of the MolecularMD website. Remember, MolecularMD is NOT to be used for urgent needs. For medical emergencies, dial 911. Now available from your iPhone and Android! Please provide this summary of care documentation to your next provider. Your primary care clinician is listed as [facility]. If you have any questions after today's visit, please call [phone].

## 2018-04-12 NOTE — PROGRESS NOTES
Darrick Louise  Identified pt with two pt identifiers(name and ). Chief Complaint   Patient presents with   Kavya  [facility]    Immunization/Injection       1. Have you been to the ER, urgent care clinic since your last visit? Hospitalized since your last visit? Seen at [facility] for knee swelling right knee    2. Have you seen or consulted any other health care providers outside of the [address] [place] [address] since your last visit? Include any pap smears or colon screening. NO    Breast feeding 7 month old baby. Today's provider has been notified of reason for visit, vitals and flowsheets obtained on patients. Advance directives given.     Reviewed record In preparation for visit, huddled with provider and have obtained necessary documentation      Health Maintenance Due   Topic    PAP AKA CERVICAL CYTOLOGY        Wt Readings from Last 3 Encounters:   18 145 lb (65.8 kg)   18 148 lb (67.1 kg)   17 190 lb (86.2 kg)     Temp Readings from Last 3 Encounters:   18 98.5 °F (36.9 °C) (Oral)   18 98.2 °F (36.8 °C)   08/15/17 98.3 °F (36.8 °C)     BP Readings from Last 3 Encounters:   18 100/65   18 117/68   08/15/17 113/66     Pulse Readings from Last 3 Encounters:   18 71   18 65   08/15/17 86     Vitals:    18 0849   BP: 100/65   Pulse: 71   Resp: 16   Temp: 98.5 °F (36.9 °C)   TempSrc: Oral   SpO2: 97%   Weight: 145 lb (65.8 kg)   Height: 5' 8\" (1.727 m)   PainSc:   0 - No pain         Learning Assessment:  :     Learning Assessment 2018   PRIMARY LEARNER Patient   PRIMARY LANGUAGE ENGLISH   LEARNER PREFERENCE PRIMARY READING     LISTENING   ANSWERED BY patient   RELATIONSHIP SELF       Depression Screening:  :     PHQ over the last two weeks 2018   Little interest or pleasure in doing things Not at all   Feeling down, depressed or hopeless Not at all   Total Score PHQ 2 0       Fall Risk Assessment:  :     No flowsheet data found.    Abuse Screening:  :     No flowsheet data found. ADL Screening:  :     ADL Assessment 4/12/2018   Feeding yourself No Help Needed   Getting from bed to chair No Help Needed   Getting dressed No Help Needed   Bathing or showering No Help Needed   Walk across the room (includes cane/walker) No Help Needed   Using the telphone No Help Needed   Taking your medications No Help Needed   Preparing meals No Help Needed   Managing money (expenses/bills) No Help Needed   Moderately strenuous housework (laundry) No Help Needed   Shopping for personal items (toiletries/medicines) No Help Needed   Shopping for groceries No Help Needed   Driving No Help Needed   Climbing a flight of stairs No Help Needed   Getting to places beyond walking distances No Help Needed                 Medication reconciliation up to date and corrected with patient at this time.

## 2018-04-13 LAB
ALBUMIN SERPL-MCNC: 4.2 G/DL (ref 3.5–5.5)
ALBUMIN/GLOB SERPL: 1.6 {RATIO} (ref 1.2–2.2)
ALP SERPL-CCNC: 71 IU/L (ref 39–117)
ALT SERPL-CCNC: 12 IU/L (ref 0–32)
ANA SER QL: NEGATIVE
AST SERPL-CCNC: 14 IU/L (ref 0–40)
BASOPHILS # BLD AUTO: 0 X10E3/UL (ref 0–0.2)
BASOPHILS NFR BLD AUTO: 0 %
BILIRUB SERPL-MCNC: 0.4 MG/DL (ref 0–1.2)
BUN SERPL-MCNC: 9 MG/DL (ref 6–20)
BUN/CREAT SERPL: 12 (ref 9–23)
CALCIUM SERPL-MCNC: 9.5 MG/DL (ref 8.7–10.2)
CCP IGA+IGG SERPL IA-ACNC: 5 UNITS (ref 0–19)
CHLORIDE SERPL-SCNC: 103 MMOL/L (ref 96–106)
CHOLEST SERPL-MCNC: 159 MG/DL (ref 100–199)
CO2 SERPL-SCNC: 24 MMOL/L (ref 18–29)
CREAT SERPL-MCNC: 0.78 MG/DL (ref 0.57–1)
EOSINOPHIL # BLD AUTO: 0 X10E3/UL (ref 0–0.4)
EOSINOPHIL NFR BLD AUTO: 1 %
ERYTHROCYTE [DISTWIDTH] IN BLOOD BY AUTOMATED COUNT: 14.9 % (ref 12.3–15.4)
ERYTHROCYTE [SEDIMENTATION RATE] IN BLOOD BY WESTERGREN METHOD: 2 MM/HR (ref 0–32)
EST. AVERAGE GLUCOSE BLD GHB EST-MCNC: 105 MG/DL
GFR SERPLBLD CREATININE-BSD FMLA CKD-EPI: 101 ML/MIN/1.73
GFR SERPLBLD CREATININE-BSD FMLA CKD-EPI: 116 ML/MIN/1.73
GLOBULIN SER CALC-MCNC: 2.6 G/DL (ref 1.5–4.5)
GLUCOSE SERPL-MCNC: 75 MG/DL (ref 65–99)
HBA1C MFR BLD: 5.3 % (ref 4.8–5.6)
HCT VFR BLD AUTO: 36.2 % (ref 34–46.6)
HDLC SERPL-MCNC: 68 MG/DL
HGB BLD-MCNC: 12 G/DL (ref 11.1–15.9)
IMM GRANULOCYTES # BLD: 0 X10E3/UL (ref 0–0.1)
IMM GRANULOCYTES NFR BLD: 0 %
LDLC SERPL CALC-MCNC: 86 MG/DL (ref 0–99)
LYMPHOCYTES # BLD AUTO: 2.4 X10E3/UL (ref 0.7–3.1)
LYMPHOCYTES NFR BLD AUTO: 48 %
MCH RBC QN AUTO: 31.1 PG (ref 26.6–33)
MCHC RBC AUTO-ENTMCNC: 33.1 G/DL (ref 31.5–35.7)
MCV RBC AUTO: 94 FL (ref 79–97)
MONOCYTES # BLD AUTO: 0.4 X10E3/UL (ref 0.1–0.9)
MONOCYTES NFR BLD AUTO: 8 %
NEUTROPHILS # BLD AUTO: 2.1 X10E3/UL (ref 1.4–7)
NEUTROPHILS NFR BLD AUTO: 43 %
PLATELET # BLD AUTO: 290 X10E3/UL (ref 150–379)
POTASSIUM SERPL-SCNC: 4.6 MMOL/L (ref 3.5–5.2)
PROT SERPL-MCNC: 6.8 G/DL (ref 6–8.5)
RBC # BLD AUTO: 3.86 X10E6/UL (ref 3.77–5.28)
RHEUMATOID FACT SERPL-ACNC: <10 IU/ML (ref 0–13.9)
SODIUM SERPL-SCNC: 142 MMOL/L (ref 134–144)
TRIGL SERPL-MCNC: 27 MG/DL (ref 0–149)
TSH SERPL DL<=0.005 MIU/L-ACNC: 1.89 UIU/ML (ref 0.45–4.5)
VLDLC SERPL CALC-MCNC: 5 MG/DL (ref 5–40)
WBC # BLD AUTO: 4.9 X10E3/UL (ref 3.4–10.8)

## 2018-04-23 NOTE — PROGRESS NOTES
Your recent labs all returned normal. This includes normal kidney and liver tests, blood counts, thyroid, diabetes screening test, and cholesterol. Your tests for rheumatoid arthritis or other inflammatory disorders returned negative. Dr. Anu Light would like to see you back in clinic in June or July to see how your knees are doing. Please schedule this appointment.

## 2018-04-24 NOTE — PROGRESS NOTES
Spoke with patient and after verifying name and date of birth of patient gave patient test results and any instructions in note per provider. Patient stated understanding. Scheduled appointment for patient per providers instructions.

## 2019-02-22 LAB — GRBS, EXTERNAL: NEGATIVE

## 2019-03-24 ENCOUNTER — HOSPITAL ENCOUNTER (EMERGENCY)
Age: 34
Discharge: HOME | End: 2019-03-24
Attending: EMERGENCY MEDICINE | Admitting: EMERGENCY MEDICINE
Payer: COMMERCIAL

## 2019-03-24 ENCOUNTER — APPOINTMENT (OUTPATIENT)
Dept: ULTRASOUND IMAGING | Age: 34
End: 2019-03-24
Attending: EMERGENCY MEDICINE
Payer: COMMERCIAL

## 2019-03-24 VITALS
BODY MASS INDEX: 22.92 KG/M2 | WEIGHT: 151.24 LBS | DIASTOLIC BLOOD PRESSURE: 60 MMHG | HEIGHT: 68 IN | SYSTOLIC BLOOD PRESSURE: 116 MMHG | TEMPERATURE: 97.5 F | OXYGEN SATURATION: 99 % | RESPIRATION RATE: 16 BRPM | HEART RATE: 89 BPM

## 2019-03-24 DIAGNOSIS — O03.9 MISCARRIAGE: Primary | ICD-10-CM

## 2019-03-24 LAB
ABO + RH BLD: NORMAL
ALBUMIN SERPL-MCNC: 3.6 G/DL (ref 3.5–5)
ALBUMIN/GLOB SERPL: 1 {RATIO} (ref 1.1–2.2)
ALP SERPL-CCNC: 53 U/L (ref 45–117)
ALT SERPL-CCNC: 17 U/L (ref 12–78)
ANION GAP SERPL CALC-SCNC: 6 MMOL/L (ref 5–15)
AST SERPL-CCNC: 21 U/L (ref 15–37)
BASOPHILS # BLD: 0 K/UL (ref 0–0.1)
BASOPHILS NFR BLD: 1 % (ref 0–1)
BILIRUB SERPL-MCNC: 0.2 MG/DL (ref 0.2–1)
BLOOD BANK CMNT PATIENT-IMP: NORMAL
BUN SERPL-MCNC: 13 MG/DL (ref 6–20)
BUN/CREAT SERPL: 18 (ref 12–20)
CALCIUM SERPL-MCNC: 9 MG/DL (ref 8.5–10.1)
CHLORIDE SERPL-SCNC: 106 MMOL/L (ref 97–108)
CO2 SERPL-SCNC: 25 MMOL/L (ref 21–32)
CREAT SERPL-MCNC: 0.73 MG/DL (ref 0.55–1.02)
DIFFERENTIAL METHOD BLD: ABNORMAL
EOSINOPHIL # BLD: 0.1 K/UL (ref 0–0.4)
EOSINOPHIL NFR BLD: 2 % (ref 0–7)
ERYTHROCYTE [DISTWIDTH] IN BLOOD BY AUTOMATED COUNT: 14.8 % (ref 11.5–14.5)
GLOBULIN SER CALC-MCNC: 3.6 G/DL (ref 2–4)
GLUCOSE SERPL-MCNC: 97 MG/DL (ref 65–100)
HCG SERPL-ACNC: 8450 MIU/ML (ref 0–6)
HCT VFR BLD AUTO: 31.6 % (ref 35–47)
HGB BLD-MCNC: 10.4 G/DL (ref 11.5–16)
IMM GRANULOCYTES # BLD AUTO: 0 K/UL (ref 0–0.04)
IMM GRANULOCYTES NFR BLD AUTO: 0 % (ref 0–0.5)
LYMPHOCYTES # BLD: 2.4 K/UL (ref 0.8–3.5)
LYMPHOCYTES NFR BLD: 38 % (ref 12–49)
MCH RBC QN AUTO: 31 PG (ref 26–34)
MCHC RBC AUTO-ENTMCNC: 32.9 G/DL (ref 30–36.5)
MCV RBC AUTO: 94.3 FL (ref 80–99)
MONOCYTES # BLD: 0.7 K/UL (ref 0–1)
MONOCYTES NFR BLD: 12 % (ref 5–13)
NEUTS SEG # BLD: 3 K/UL (ref 1.8–8)
NEUTS SEG NFR BLD: 47 % (ref 32–75)
NRBC # BLD: 0 K/UL (ref 0–0.01)
NRBC BLD-RTO: 0 PER 100 WBC
PLATELET # BLD AUTO: 276 K/UL (ref 150–400)
PMV BLD AUTO: 10.9 FL (ref 8.9–12.9)
POTASSIUM SERPL-SCNC: 3.9 MMOL/L (ref 3.5–5.1)
PROT SERPL-MCNC: 7.2 G/DL (ref 6.4–8.2)
RBC # BLD AUTO: 3.35 M/UL (ref 3.8–5.2)
SODIUM SERPL-SCNC: 137 MMOL/L (ref 136–145)
WBC # BLD AUTO: 6.2 K/UL (ref 3.6–11)

## 2019-03-24 PROCEDURE — 96374 THER/PROPH/DIAG INJ IV PUSH: CPT

## 2019-03-24 PROCEDURE — 36415 COLL VENOUS BLD VENIPUNCTURE: CPT

## 2019-03-24 PROCEDURE — 80053 COMPREHEN METABOLIC PANEL: CPT

## 2019-03-24 PROCEDURE — 85025 COMPLETE CBC W/AUTO DIFF WBC: CPT

## 2019-03-24 PROCEDURE — 99285 EMERGENCY DEPT VISIT HI MDM: CPT

## 2019-03-24 PROCEDURE — 86900 BLOOD TYPING SEROLOGIC ABO: CPT

## 2019-03-24 PROCEDURE — 74011250636 HC RX REV CODE- 250/636

## 2019-03-24 PROCEDURE — 74011250637 HC RX REV CODE- 250/637: Performed by: EMERGENCY MEDICINE

## 2019-03-24 PROCEDURE — 84702 CHORIONIC GONADOTROPIN TEST: CPT

## 2019-03-24 PROCEDURE — 99284 EMERGENCY DEPT VISIT MOD MDM: CPT

## 2019-03-24 PROCEDURE — 76830 TRANSVAGINAL US NON-OB: CPT

## 2019-03-24 PROCEDURE — 74011250636 HC RX REV CODE- 250/636: Performed by: EMERGENCY MEDICINE

## 2019-03-24 PROCEDURE — 96372 THER/PROPH/DIAG INJ SC/IM: CPT

## 2019-03-24 PROCEDURE — 96361 HYDRATE IV INFUSION ADD-ON: CPT

## 2019-03-24 RX ORDER — METHYLERGONOVINE MALEATE 0.2 MG/ML
0.2 INJECTION INTRAVENOUS ONCE
Status: COMPLETED | OUTPATIENT
Start: 2019-03-24 | End: 2019-03-24

## 2019-03-24 RX ORDER — ONDANSETRON 2 MG/ML
8 INJECTION INTRAMUSCULAR; INTRAVENOUS
Status: COMPLETED | OUTPATIENT
Start: 2019-03-24 | End: 2019-03-24

## 2019-03-24 RX ORDER — ACETAMINOPHEN 500 MG
1000 TABLET ORAL ONCE
Status: COMPLETED | OUTPATIENT
Start: 2019-03-24 | End: 2019-03-24

## 2019-03-24 RX ORDER — ONDANSETRON 2 MG/ML
INJECTION INTRAMUSCULAR; INTRAVENOUS
Status: COMPLETED
Start: 2019-03-24 | End: 2019-03-24

## 2019-03-24 RX ADMIN — SODIUM CHLORIDE 1000 ML: 900 INJECTION, SOLUTION INTRAVENOUS at 01:33

## 2019-03-24 RX ADMIN — ACETAMINOPHEN 1000 MG: 500 TABLET ORAL at 04:51

## 2019-03-24 RX ADMIN — METHYLERGONOVINE MALEATE 0.2 MG: 0.2 INJECTION, SOLUTION INTRAMUSCULAR; INTRAVENOUS at 04:34

## 2019-03-24 RX ADMIN — ONDANSETRON 8 MG: 2 INJECTION INTRAMUSCULAR; INTRAVENOUS at 05:30

## 2019-03-24 NOTE — ED PROVIDER NOTES
EMERGENCY DEPARTMENT HISTORY AND PHYSICAL EXAM 
     
 
Date: 3/24/2019 Patient Name: Jeff Fisher History of Presenting Illness Chief Complaint Patient presents with  Miscarriage Heavy bleeding x1 hour. Stated she saw tissue at home but did not bring it. Thinks she was 9 weeks pregnant. Still having heavy bleeding and some abdominal cramping History Provided By: Patient HPI: Jeff Fisher is a 35 y.o. female, without PMHx, who presents ambulatory to the ED c/o Vaginal bleeding. Patient is a  at approximately 9 weeks with LMP 19. Tonight she started with back pain, mild cramping (2/10) and vaginal bleeding with clots. She has an OB intake appt upcoming. Notes she lost her first pregnancy in the first trimester. PCP: Tai Warner MD 
 
Allergies: PCN Social Hx: -tobacco, -EtOH, -Illicit Drugs There are no other complaints, changes, or physical findings at this time. Current Outpatient Medications Medication Sig Dispense Refill  naproxen sodium (ALEVE) 220 mg tablet Take 220 mg by mouth daily as needed. Past History Past Medical History: No past medical history on file. Past Surgical History: No past surgical history on file. Family History: 
Family History Problem Relation Age of Onset  Other Mother   
     high cholesterol  Hypertension Father  Diabetes Father  Other Father CHF  Cancer Other   
     lung Social History: 
Social History Tobacco Use  Smoking status: Never Smoker  Smokeless tobacco: Never Used Substance Use Topics  Alcohol use: Yes Comment: occ social  
 Drug use: No  
 
 
Allergies: Allergies Allergen Reactions  Penicillin G Hives  
  childhood Review of Systems Review of Systems Constitutional: Negative for activity change, appetite change, chills, fever and unexpected weight change. HENT: Negative for congestion. Eyes: Negative for pain and visual disturbance. Respiratory: Negative for cough and shortness of breath. Cardiovascular: Negative for chest pain. Gastrointestinal: Negative for abdominal pain, diarrhea, nausea and vomiting. Genitourinary: Negative for dysuria. Musculoskeletal: Negative for back pain. Skin: Negative for rash. Neurological: Negative for headaches. Physical Exam  
Physical Exam  
Constitutional: She is oriented to person, place, and time. She appears well-developed and well-nourished. Young, healthy appearing female in minimal distress. HENT:  
Head: Normocephalic and atraumatic. Mouth/Throat: Oropharynx is clear and moist.  
Eyes: Pupils are equal, round, and reactive to light. Conjunctivae and EOM are normal. Right eye exhibits no discharge. Left eye exhibits no discharge. Neck: Normal range of motion. Neck supple. Cardiovascular: Normal rate, regular rhythm and normal heart sounds. No murmur heard. Pulmonary/Chest: Effort normal and breath sounds normal. No respiratory distress. She has no wheezes. She has no rales. Abdominal: Soft. Bowel sounds are normal. She exhibits no distension. There is no tenderness. Musculoskeletal: Normal range of motion. She exhibits no edema. Neurological: She is alert and oriented to person, place, and time. No cranial nerve deficit. She exhibits normal muscle tone. Skin: Skin is warm and dry. No rash noted. She is not diaphoretic. Nursing note and vitals reviewed. Diagnostic Study Results Labs - No results found for this or any previous visit (from the past 12 hour(s)). Radiologic Studies -  
US TRANSVAGINAL Final Result IMPRESSION:    
1. No evidence of intrauterine or ectopic pregnancy. 2. Small left ovarian cyst  
  
  
  
  
  
  
  
 
CT Results  (Last 48 hours) None CXR Results  (Last 48 hours) None Medical Decision Making I am the first provider for this patient. I reviewed the vital signs, available nursing notes, past medical history, past surgical history, family history and social history. Vital Signs-Reviewed the patient's vital signs. No data found. Pulse Oximetry Analysis 100% on RA Cardiac Monitor:  
Rate: 100bpm 
Rhythm: Normal Sinus Rhythm Records Reviewed: Nursing Notes and Old Medical Records Provider Notes (Medical Decision Making): MDM: First trimester pregnancy with heavy VB. Patient currently hemodynamically stable in minimal pain with a benign abdominal exam. Will send for US. ED Course:  
Initial assessment performed. The patients presenting problems have been discussed, and they are in agreement with the care plan formulated and outlined with them. I have encouraged them to ask questions as they arise throughout their visit. 3:30 AM 
Ultrasound complete without evidence of retained products Procedure Note - Pelvic Exam:   
3:45 AM 
Performed by: Adama Gipson MD 
Chaperoned by: Osvaldo Birmingham Pelvic exam was performed using bimanual and speculum. Cervix remained dilated with large clot in the cervix. Attempted to remove clots in the vaginal vault but was unable to remove the clots in the cervix. The procedure took 1-15 minutes, and pt tolerated moderately. CONSULT NOTE:  
4 AM 
Geoffrey Pimentel MD spoke with on-call OB hospitalist 
Discussed pt's hx, disposition, and available diagnostic and imaging results. Reviewed care plans. Consultant agrees with plans as outlined. She recommends IM methargen and reevaluation of improvement. PROGRESS NOTE: 
6 AM 
Pt appears improved without any bleeding upon standing or ambulation around the room. Discussed OB recommendations with follow-up this week with her home OB. Return precautions reviewed. Critical Care Time:  
0 Diagnosis Clinical Impression: 1. Miscarriage PLAN: 
1. Discharge Medication List as of 3/24/2019  6:20 AM  
  
 
2. Follow-up Information Follow up With Specialties Details Why Contact Info Dena Wing MD Internal Medicine Schedule an appointment as soon as possible for a visit for recheck this week [address] [address] [phone] [facility] EMERGENCY DEPT Emergency Medicine  If symptoms worsen [address] [facility] 
[phone] Return to ED if worse Disposition: 
Home

## 2019-03-24 NOTE — DISCHARGE INSTRUCTIONS
Patient Education        Miscarriage: Care Instructions  Your Care Instructions  The loss of a pregnancy can be very hard. You may wonder why it happened or blame yourself. Miscarriages are common and are not caused by exercise, stress, or sex. Most happen because the fertilized egg in the uterus does not develop normally. There is no treatment that can stop a miscarriage. As long as you do not have heavy blood loss, fever, weakness, or other signs of infection, you can let a miscarriage follow its own course. This can take several days. Your body will recover over the next several weeks. Having a miscarriage does not mean you cannot have a normal pregnancy in the future. The doctor has checked you carefully, but problems can develop later. If you notice any problems or new symptoms, get medical treatment right away. Follow-up care is a key part of your treatment and safety. Be sure to make and go to all appointments, and call your doctor if you are having problems. It's also a good idea to know your test results and keep a list of the medicines you take. How can you care for yourself at home? · You will probably have some vaginal bleeding for 1 to 2 weeks. It may be similar to or slightly heavier than a normal period. The bleeding should get lighter after a week. Use pads instead of tampons. You may use tampons during your next period, which should start in 3 to 6 weeks. · Take an over-the-counter pain medicine, such as acetaminophen (Tylenol), ibuprofen (Advil, Motrin), or naproxen (Aleve) for cramps. Read and follow all instructions on the label. You may have cramps for several days after the miscarriage. · Do not take two or more pain medicines at the same time unless the doctor told you to. Many pain medicines have acetaminophen, which is Tylenol. Too much acetaminophen (Tylenol) can be harmful. · Use a clear container to save any tissue that you pass.  Take it to your doctor's office as soon as you can.  · Do not have sex until the bleeding stops. · You may return to your normal activities if you feel well enough to do so. But you should avoid heavy exercise until the bleeding stops. · If you plan to get pregnant again, check with your doctor. Most doctors suggest waiting until you have had at least one normal period before you try to get pregnant. · If you do not want to get pregnant, ask your doctor about birth control. You can get pregnant again before your next period starts if you are not using birth control. · You may be low in iron because of blood loss. Eat a balanced diet that is high in iron and vitamin C. Foods rich in iron include red meat, shellfish, eggs, beans, and leafy green vegetables. Foods high in vitamin C include citrus fruits, tomatoes, and broccoli. Talk to your doctor about whether you need to take iron pills or a multivitamin. · The loss of a pregnancy can be very hard. You may wonder why it happened and blame yourself. Talking to family members, friends, a counselor, or your doctor may help you cope with your loss. When should you call for help? Call 911 anytime you think you may need emergency care. For example, call if:    · You passed out (lost consciousness).    Call your doctor now or seek immediate medical care if:    · You have severe vaginal bleeding.     · You are dizzy or lightheaded, or you feel like you may faint.     · You have new or worse pain in your belly or pelvis.     · You have a fever.     · You have vaginal discharge that smells bad.    Watch closely for changes in your health, and be sure to contact your doctor if:    · You do not get better as expected. Where can you learn more? Go to http://valerie-mariana.info/. Enter E802 in the search box to learn more about \"Miscarriage: Care Instructions. \"  Current as of: September 5, 2018  Content Version: 11.9  © 7623-4849 [employer].  Care instructions adapted under license by [employer] (which disclaims liability or warranty for this information). If you have questions about a medical condition or this instruction, always ask your healthcare professional. [address] any warranty or liability for your use of this information.

## 2019-03-24 NOTE — ED NOTES
Miscarriage. Pt approx 9 weeks pregnant reports heavy bleeding that started at approx. 22:00 hrs last night (pt states that she has had spotting for approx one month). Pt states she saw fetal tissue at home but did not bring it. Pt is still experiencing heavy bleeding and lower abdominal cramping. Pt reporting pain is at a 02/10 in her lower Abd and Back. [facility] 2019  A1 Pt denies CP/SOB, -NVD, -Fever/Chills, -Urinary Symptoms, -Ha/Dz

## 2019-08-02 ENCOUNTER — OFFICE VISIT (OUTPATIENT)
Dept: INTERNAL MEDICINE CLINIC | Facility: CLINIC | Age: 34
End: 2019-08-02

## 2019-08-02 VITALS
TEMPERATURE: 98.8 F | OXYGEN SATURATION: 98 % | WEIGHT: 151.6 LBS | BODY MASS INDEX: 22.97 KG/M2 | RESPIRATION RATE: 18 BRPM | HEART RATE: 82 BPM | HEIGHT: 68 IN | SYSTOLIC BLOOD PRESSURE: 104 MMHG | DIASTOLIC BLOOD PRESSURE: 62 MMHG

## 2019-08-02 DIAGNOSIS — M67.432 GANGLION CYST OF DORSUM OF LEFT WRIST: Primary | ICD-10-CM

## 2019-08-02 DIAGNOSIS — Z34.90 INTRAUTERINE PREGNANCY: ICD-10-CM

## 2019-08-02 NOTE — PROGRESS NOTES
CC:   Chief Complaint   Patient presents with    Cyst     3B//Cyst developed on left wrist a while ago and it usually leaves but has stayed on wrist since 2019    Labs     Patient took urine pregnancy test at home and it resulted positive. She is requesting blood urine test today       HISTORY OF PRESENT ILLNESS  Maria Dolores Butler is a 29 y.o. female. Patient complains of a cyst on her left wrist present since 2019 that is unchanged in size but causes intermittent sharp 4/10 pain when she bends it a certain way. She has no chronic medical problems. Last seen in clinic on 18. Reports she had a positive urine pregnancy test at home recently; requests beta-HCG testing to see how far along she is. Soc Hx  . Has 2 children (1 yo son and 3year old son). 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 Her job is mostly sedentary. Never smoker. Drinks occasional alcohol. Denies recreational drug use. Does not get regular exercise.     Health Maintenance  Flu vaccine: declined                                        Tetanus vaccine: Tdap 17  Pap smear: has an appt next month, Dr. Kristen Betancur                        Lipids: will check today  A1c: will check today  Advanced Directives: given information today  End of Life: given information today                    ROS  A complete review of systems was performed and is negative except for those mentioned in the HPI.     Patient Active Problem List   Diagnosis Code    Normal labor O80, Z37.9    Abnormal  test O28.9     Past Medical History:   Diagnosis Date    Asthma     As a child    Heart murmur     As a child     Allergies   Allergen Reactions    Penicillin G Hives     childhood         PHYSICAL EXAM  Visit Vitals  /62 (BP 1 Location: Right arm, BP Patient Position: Sitting)   Pulse 82   Temp 98.8 °F (37.1 °C) (Oral)   Resp 18   Ht 5' 8\" (1.727 m)   Wt 151 lb 9.6 oz (68.8 kg)   LMP 06/10/2019 (Approximate)   SpO2 98%   BMI 23.05 kg/m²       General: Well-developed and well-nourished, no distress. HEENT:  Head normocephalic/atraumatic, no scleral icterus  Lungs:  Clear to ausculation bilaterally. Good air movement. Heart:  Regular rate and rhythm, normal S1 and S2, no murmur, gallop, or rub  Extremities: No clubbing, cyanosis, or edema. 2 cm soft, mobile non-tender mass at dorsum of left wrist.  Neurological: Alert and oriented. Psychiatric: Normal mood and affect. Behavior is normal.         ASSESSMENT AND PLAN    ICD-10-CM ICD-9-CM    1. Ganglion cyst of dorsum of left wrist M67.432 727.41 REFERRAL TO ORTHOPEDICS   2. Intrauterine pregnancy Z34.90 V22.2 BETA HCG, QT     Diagnoses and all orders for this visit:    1. Ganglion cyst of dorsum of left wrist  I explained to her that it does not necessarily need to be removed but she does not like the fact that it sometimes causes pain. -     REFERRAL TO ORTHOPEDICS (Dr. Nadine Hager)    2. Intrauterine pregnancy  -     BETA HCG, QT      Follow-up and Dispositions    · Return in about 1 year (around 8/2/2020), or if symptoms worsen or fail to improve, for Annual physical.         I have discussed the diagnosis with the patient and the intended plan as seen in the above orders. Patient is in agreement. The patient has received an after-visit summary and questions were answered concerning future plans. I have discussed medication side effects and warnings with the patient as well.

## 2019-08-02 NOTE — PATIENT INSTRUCTIONS
Ganglions: Care Instructions Your Care Instructions A ganglion is a small sac, or cyst, filled with a clear fluid that is like jelly. A ganglion may look like a bump on the hand or wrist. It also can appear on your feet, ankles, knees, or shoulders. It is not cancer. A ganglion can grow out of the protective area, or capsule, around a joint. It also can grow on a tendon sheath, which covers the ropelike tendons that connect muscle to bone. A ganglion may hurt or cause numbness if it presses on a nerve. Many ganglions do not need treatment, and they often go away on their own. But if a ganglion hurts, becomes larger, causes numbness, or limits your activity, your doctor may want to drain it with a needle and syringe or remove it with minor surgery. Follow-up care is a key part of your treatment and safety. Be sure to make and go to all appointments, and call your doctor if you are having problems. It's also a good idea to know your test results and keep a list of the medicines you take. How can you care for yourself at home? · Wear a wrist or finger splint as directed by your doctor. It will keep your wrist or hand from moving and help reduce the fluid in the cyst. This may be all you need for the ganglion to shrink and go away. · Do not smash a ganglion with a book or other heavy object. You may break a bone or otherwise injure your wrist by trying this folk remedy, and the ganglion may return anyway. · Do not try to drain the fluid by poking the ganglion with a pin or any other sharp object. You could cause an infection. When should you call for help? Call your doctor now or seek immediate medical care if: 
  · You have signs of infection, such as: 
? Increased pain, swelling, warmth, or redness. ? Red streaks leading from the cyst. 
? Pus draining from the cyst. 
? A fever.  
 Watch closely for changes in your health, and be sure to contact your doctor if: 
  · You have increasing pain.   · Your ganglion is getting larger.  
  · You still have pain or numbness from a ganglion. Where can you learn more? Go to http://valerie-mariana.info/. Enter Y071 in the search box to learn more about \"Ganglions: Care Instructions. \" Current as of: September 20, 2018 Content Version: 12.1 © 5944-6525 [employer]. Care instructions adapted under license by [employer] (which disclaims liability or warranty for this information). If you have questions about a medical condition or this instruction, always ask your healthcare professional. [place] [address] any warranty or liability for your use of this information.

## 2019-08-03 LAB — HCG INTACT+B SERPL-ACNC: NORMAL MIU/ML

## 2019-08-06 ENCOUNTER — TELEPHONE (OUTPATIENT)
Dept: INTERNAL MEDICINE CLINIC | Facility: CLINIC | Age: 34
End: 2019-08-06

## 2019-08-06 NOTE — TELEPHONE ENCOUNTER
Pt was calling to follow up on her HCG results from Friday. Pt stated that she needed them so that she could let her OB know.

## 2019-08-15 ENCOUNTER — TELEPHONE (OUTPATIENT)
Dept: INTERNAL MEDICINE CLINIC | Facility: CLINIC | Age: 34
End: 2019-08-15

## 2019-08-15 NOTE — TELEPHONE ENCOUNTER
I have sent a message by My Chart instructing her to start taking OTC pyridoxine (vitamin B6) 25 mg 1 tab every 6 hrs as needed for nausea until she sees her OB/GYN doctor.

## 2019-08-15 NOTE — TELEPHONE ENCOUNTER
----- Message from Sylvia Paredes LPN sent at 9/95/0780  3:33 PM EDT -----  Regarding: FW: Prescription Question  Contact: [phone]      ----- Message -----  From: Sarina Sams  Sent: 8/13/2019   1:25 PM EDT  To: [facility]  Subject: Prescription Question                            Good afternoon, I was wondering if an antiemetic Rx could be written for me? I'm currently not scheduled to see my OB until the 23rd but the morning sickness is starting to last all through the day.

## 2019-08-23 ENCOUNTER — OFFICE VISIT (OUTPATIENT)
Dept: OBGYN CLINIC | Age: 34
End: 2019-08-23

## 2019-08-23 VITALS — BODY MASS INDEX: 23.64 KG/M2 | HEIGHT: 68 IN | WEIGHT: 156 LBS

## 2019-08-23 DIAGNOSIS — N91.2 AMENORRHEA: Primary | ICD-10-CM

## 2019-08-23 NOTE — PROGRESS NOTES
TA ULTRASOUND PERFORMED  A SINGLE VIABLE 10W1D WITH CHEYENNE OF 2020 IUP IS SEEN WITH NORMAL CARDIAC RHYTHM. GESTATIONAL AGE BASED ON TODAYS ULTRASOUND. A NORMAL YOLK SAC IS SEEN. RIGHT OVARY APPEARS WITHIN NORMAL LIMITS. LEFT OVARY APPEARS WITHIN NORMAL LIMITS. NO FREE FLUID IS SEEN IN THE CDS      Confirmation of Pregnancy Visit    Darren Deal is a 29 y.o.  presenting for confirmation of pregnancy. She is well without complaints today. Ob/Gyn Hx:   A -  LMP- Date: (unsure)  Menstrual pattern prior to conception: irregular due to breast feeding   Contraception at time of conception?  none      Past Medical History:   Diagnosis Date    Asthma     As a child    Heart murmur     As a child     History reviewed. No pertinent surgical history.   Social History     Occupational History    Not on file   Tobacco Use    Smoking status: Never Smoker    Smokeless tobacco: Never Used   Substance and Sexual Activity    Alcohol use: Yes     Comment: occ social    Drug use: No    Sexual activity: Not on file     Family History   Problem Relation Age of Onset    Other Mother         high cholesterol    Hypertension Father     Diabetes Father     Other Father         CHF    Cancer Other         lung     OB History    Para Term  AB Living   2 1 1     1   SAB TAB Ectopic Molar Multiple Live Births           0 1      # Outcome Date GA Lbr Santiago/2nd Weight Sex Delivery Anes PTL Lv   2 Current            1 Term 17 39w0d  6 lb 13.4 oz (3.1 kg) M Vag-Spont EPIDURAL AN N JERRELL     Allergies   Allergen Reactions    Penicillin G Hives     childhood     Prior to Admission medications    Not on File        Review of Systems - History obtained from the patient  Constitutional: negative for weight loss, fever, night sweats  HEENT: negative for hearing loss, earache, congestion, snoring, sorethroat  CV: negative for chest pain, palpitations, edema  Resp: negative for cough, shortness of breath, wheezing  GI: negative for change in bowel habits, abdominal pain, black or bloody stools  : negative for frequency, dysuria, hematuria, vaginal discharge  MSK: negative for back pain, joint pain, muscle pain  Breast: negative for breast lumps, nipple discharge, galactorrhea  Skin :negative for itching, rash, hives  Neuro: negative for dizziness, headache, confusion, weakness  Psych: negative for anxiety, depression, change in mood  Heme/lymph: negative for bleeding, bruising, pallor    Physical Exam    Visit Vitals  Ht 5' 8\" (1.727 m)   Wt 156 lb (70.8 kg)   LMP 2019   BMI 23.72 kg/m²       Constitutional  · Appearance: well-nourished, well developed, alert, in no acute distress    HENT  · Head and Face: appears normal    Neck  · Inspection/Palpation: normal appearance    Chest  · Auscultation: CTAB, normal breath sounds    Cardiovascular  · Heart:  · Extremities: no peripheral edema    Gastrointestinal  · Abdominal Examination: abdomen non-tender to palpation, normal bowel sounds, no masses present  · Liver and spleen: no hepatomegaly present, spleen not palpable  · Hernias: no hernias identified    Skin  · General Inspection: no rash, no lesions identified    Neurologic/Psychiatric  · Mental Status:  · Orientation: grossly oriented to person, place and time  · Mood and Affect: mood normal, affect appropriate      Assessment/Plan:  29 y.o.  SIUP +CA CHEYENNE 3/19/20    Continue daily PNV. RTC: 1 month for Initial OB appointment, or sooner prn for problems or concerns  Cramping, pain, bleeding precautions reviewed.   Handouts and instructions provided    Cynthia Rodriguez CNM

## 2019-09-13 ENCOUNTER — ROUTINE PRENATAL (OUTPATIENT)
Dept: OBGYN CLINIC | Age: 34
End: 2019-09-13

## 2019-09-13 VITALS
WEIGHT: 158.2 LBS | BODY MASS INDEX: 23.98 KG/M2 | HEIGHT: 68 IN | DIASTOLIC BLOOD PRESSURE: 70 MMHG | SYSTOLIC BLOOD PRESSURE: 110 MMHG

## 2019-09-13 DIAGNOSIS — Z34.91 PRENATAL CARE IN FIRST TRIMESTER: Primary | ICD-10-CM

## 2019-09-13 DIAGNOSIS — Z3A.10 10 WEEKS GESTATION OF PREGNANCY: ICD-10-CM

## 2019-09-13 NOTE — PATIENT INSTRUCTIONS
Weeks 10 to 14 of Your Pregnancy: Care Instructions  Your Care Instructions    By weeks 10 to 14 of your pregnancy, the placenta has formed inside your uterus. It is possible to hear your baby's heartbeat with a special ultrasound device. Your baby's eyes can and do move. The arms and legs can bend. This is a good time to think about testing for birth defects. There are two types of tests: screening and diagnostic. Screening tests show the chance that a baby has a certain birth defect. They can't tell you for sure that your baby has a problem. Diagnostic tests show if a baby has a certain birth defect. It's your choice whether to have these tests. You and your partner can talk to your doctor or midwife about birth defects tests. Follow-up care is a key part of your treatment and safety. Be sure to make and go to all appointments, and call your doctor if you are having problems. It's also a good idea to know your test results and keep a list of the medicines you take. How can you care for yourself at home? Decide about tests  · You can have screening tests and diagnostic tests to check for birth defects. The decision to have a test for birth defects is personal. Think about your age, your chance of passing on a family disease, your need to know about any problems, and what you might do after you have the test results. ? Triple or quadruple (quad) blood tests. These screening tests can be done between 15 and 20 weeks of pregnancy. They check the amounts of three or four substances in your blood. The doctor looks at these test results, along with your age and other factors, to find out the chance that your baby may have certain problems. ? Amniocentesis. This diagnostic test is used to look for chromosomal problems in the baby's cells.  It can be done between 15 and 20 weeks of pregnancy, usually around week 16.  ? Nuchal translucency test. This test uses ultrasound to measure the thickness of the area at the back of the baby's neck. An increase in the thickness can be an early sign of Down syndrome. ? Chorionic villus sampling (CVS). This is a test that looks for certain genetic problems with your baby. The same genes that are in your baby are in the placenta. A small piece of the placenta is taken out and tested. This test is done when you are 10 to 13 weeks pregnant. Ease discomfort  · Slow down and take naps when you feel tired. · If your emotions swing, talk to someone. Crying, anxiety, and concentration problems are common. · If your gums bleed, try a softer toothbrush. If your gums are puffy and bleed a lot, see your dentist.  · If you feel dizzy:  ? Get up slowly after sitting or lying down. ? Drink plenty of fluids. ? Eat small snacks to keep your blood sugar stable. ? Put your head between your legs as though you were tying your shoelaces. ? Lie down with your legs higher than your head. Use pillows to prop up your feet. · If you have a headache:  ? Lie down. ? Ask your partner or a good friend for a neck massage. ? Try cool cloths over your forehead or across the back of your neck. ? Use acetaminophen (Tylenol) for pain relief. Do not use nonsteroidal anti-inflammatory drugs (NSAIDs), such as ibuprofen (Advil, Motrin) or naproxen (Aleve), unless your doctor says it is okay. · If you have a nosebleed, pinch your nose gently, and hold it for a short while. To prevent nosebleeds, try massaging a small dab of petroleum jelly, such as Vaseline, in your nostrils. · If your nose is stuffed up, try saline (saltwater) nose sprays. Do not use decongestant sprays. Care for your breasts  · Wear a bra that gives you good support. · Know that changes in your breasts are normal.  ? Your breasts may get larger and more tender. Tenderness usually gets better by 12 weeks. ? Your nipples may get darker and larger, and small bumps around your nipples may show more. ?  The veins in your chest and breasts may show more. · Don't worry about \"toughening'\" your nipples. Breastfeeding will naturally do this. Where can you learn more? Go to http://valerie-mariana.info/. Enter K020 in the search box to learn more about \"Weeks 10 to 14 of Your Pregnancy: Care Instructions. \"  Current as of: September 5, 2018  Content Version: 12.1  © 5944-7699 "[employer] Care instructions adapted under license by [employer] (which disclaims liability or warranty for this information). If you have questions about a medical condition or this instruction, always ask your healthcare professional. [address] any warranty or liability for your use of this information.

## 2019-09-13 NOTE — PROGRESS NOTES
Patient voiced no concerns. Her odor/food aversions and sensitivities have improved. Son Poncho Driscoll just turned 2 on Sept 13!! Declines all genetic testing. Labs and urine today.

## 2019-09-15 LAB — BACTERIA UR CULT: NORMAL

## 2019-09-17 LAB
ABO GROUP BLD: NORMAL
BLD GP AB SCN SERPL QL: NEGATIVE
ERYTHROCYTE [DISTWIDTH] IN BLOOD BY AUTOMATED COUNT: 14.3 % (ref 12.3–15.4)
HBV SURFACE AG SERPL QL IA: NEGATIVE
HCT VFR BLD AUTO: 33.6 % (ref 34–46.6)
HGB A MFR BLD: 97.4 % (ref 96.4–98.8)
HGB A2 MFR BLD COLUMN CHROM: 2.6 % (ref 1.8–3.2)
HGB BLD-MCNC: 11.5 G/DL (ref 11.1–15.9)
HGB C MFR BLD: 0 %
HGB F MFR BLD: 0 % (ref 0–2)
HGB FRACT BLD-IMP: NORMAL
HGB OTHER MFR BLD HPLC: 0 %
HGB S BLD QL SOLY: NEGATIVE
HGB S MFR BLD: 0 %
HIV 1+2 AB+HIV1 P24 AG SERPL QL IA: NON REACTIVE
MCH RBC QN AUTO: 30.7 PG (ref 26.6–33)
MCHC RBC AUTO-ENTMCNC: 34.2 G/DL (ref 31.5–35.7)
MCV RBC AUTO: 90 FL (ref 79–97)
PLATELET # BLD AUTO: 307 X10E3/UL (ref 150–450)
RBC # BLD AUTO: 3.74 X10E6/UL (ref 3.77–5.28)
RH BLD: POSITIVE
RUBV IGG SERPL IA-ACNC: 13.3 INDEX
T PALLIDUM AB SER QL IA: NEGATIVE
VZV IGG SER IA-ACNC: 394 INDEX
WBC # BLD AUTO: 7.8 X10E3/UL (ref 3.4–10.8)

## 2019-09-18 LAB
C TRACH RRNA SPEC QL NAA+PROBE: NEGATIVE
N GONORRHOEA RRNA SPEC QL NAA+PROBE: NEGATIVE

## 2019-10-02 NOTE — PROGRESS NOTES
Notes reviewed from [facility] - only her last OV from her SAB was included here.

## 2019-10-11 ENCOUNTER — ROUTINE PRENATAL (OUTPATIENT)
Dept: OBGYN CLINIC | Age: 34
End: 2019-10-11

## 2019-10-11 VITALS
SYSTOLIC BLOOD PRESSURE: 94 MMHG | WEIGHT: 165.2 LBS | BODY MASS INDEX: 25.04 KG/M2 | DIASTOLIC BLOOD PRESSURE: 60 MMHG | HEIGHT: 68 IN

## 2019-10-11 DIAGNOSIS — Z3A.10 10 WEEKS GESTATION OF PREGNANCY: ICD-10-CM

## 2019-10-11 NOTE — PROGRESS NOTES
Chief Complaint   Patient presents with    Routine Prenatal Visit     Pt has general information questions regarding delivery. Pt unable to leave urine specimen. 1. Have you been to the ER, urgent care clinic since your last visit? Hospitalized since your last visit? No    2. Have you seen or consulted any other health care providers outside of the [address] [place] [address] since your last visit? Include any pap smears or colon screening. No    Feeling great! Feeling kicking now! Discussed doulas. Anatomy scan @ [facility].      Shirley Vincent MD

## 2019-10-11 NOTE — PATIENT INSTRUCTIONS
Learning About Pregnancy  Your Care Instructions    Your health in the early weeks of your pregnancy is particularly important for your baby's health. Take good care of yourself. Anything you do that harms your body can also harm your baby. Make sure to go to all of your doctor appointments. Regular checkups will help keep you and your baby healthy. How can you care for yourself at home? Diet    · Eat a balanced diet. Make sure your diet includes plenty of beans, peas, and leafy green vegetables.     · Do not skip meals or go for many hours without eating. If you are nauseated, try to eat a small, healthy snack every 2 to 3 hours.     · Do not eat fish that has a high level of mercury, such as shark, swordfish, or mackerel. Do not eat more than one can of tuna each week.     · Drink plenty of fluids, enough so that your urine is light yellow or clear like water. If you have kidney, heart, or liver disease and have to limit fluids, talk with your doctor before you increase the amount of fluids you drink.     · Cut down on caffeine, such as coffee, tea, and cola.     · Do not drink alcohol, such as beer, wine, or hard liquor.     · Take a multivitamin that contains at least 400 micrograms (mcg) of folic acid to help prevent birth defects. Fortified cereal and whole wheat bread are good additional sources of folic acid.     · Increase the calcium in your diet. Try to drink a quart of skim milk each day. You may also take calcium supplements and choose foods such as cheese and yogurt.    Lifestyle    · Make sure you go to your follow-up appointments.     · Get plenty of rest. You may be unusually tired while you are pregnant.     · Get at least 30 minutes of exercise on most days of the week. Walking is a good choice. If you have not exercised in the past, start out slowly. Take several short walks each day.     · Do not smoke. If you need help quitting, talk to your doctor about stop-smoking programs.  These can increase your chances of quitting for good.     · Do not touch cat feces or litter boxes. Also, wash your hands after you handle raw meat, and fully cook all meat before you eat it. Wear gloves when you work in the yard or garden, and wash your hands well when you are done. Cat feces, raw or undercooked meat, and contaminated dirt can cause an infection that may harm your baby or lead to a miscarriage.     · Do not use saunas or hot tubs. Raising your body temperature may harm your baby.     · Avoid chemical fumes, paint fumes, or poisons.     · Do not use illegal drugs or alcohol. Medicines    · Review all of your medicines with your doctor. Some of your routine medicines may need to be changed to protect your baby.     · Use acetaminophen (Tylenol) to relieve minor problems, such as a mild headache or backache or a mild fever with cold symptoms. Do not use nonsteroidal anti-inflammatory drugs (NSAIDs), such as ibuprofen (Advil, Motrin) or naproxen (Aleve), unless your doctor says it is okay.     · Do not take two or more pain medicines at the same time unless the doctor told you to. Many pain medicines have acetaminophen, which is Tylenol. Too much acetaminophen (Tylenol) can be harmful.     · Take your medicines exactly as prescribed. Call your doctor if you think you are having a problem with your medicine.    To manage morning sickness    · If you feel sick when you first wake up, try eating a small snack (such as crackers) before you get out of bed. Allow some time to digest the snack, and then get out of bed slowly.     · Do not skip meals or go for long periods without eating. An empty stomach can make nausea worse.     · Eat small, frequent meals instead of three large meals each day.     · Drink plenty of fluids.  Sports drinks, such as Gatorade or Powerade, are good choices.     · Eat foods that are high in protein but low in fat.     · If you are taking iron supplements, ask your doctor if they are necessary. Iron can make nausea worse.     · Avoid any smells, such as coffee, that make you feel sick.     · Get lots of rest. Morning sickness may be worse when you are tired. Follow-up care is a key part of your treatment and safety. Be sure to make and go to all appointments, and call your doctor if you are having problems. It's also a good idea to know your test results and keep a list of the medicines you take. Where can you learn more? Go to http://valerie-mariana.info/. Enter C163 in the search box to learn more about \"Learning About Pregnancy. \"  Current as of: May 29, 2019  Content Version: 12.2  © 5193-5047 [employer]. Care instructions adapted under license by [employer] (which disclaims liability or warranty for this information). If you have questions about a medical condition or this instruction, always ask your healthcare professional. [address] any warranty or liability for your use of this information.

## 2019-10-30 ENCOUNTER — TELEPHONE (OUTPATIENT)
Dept: INTERNAL MEDICINE CLINIC | Facility: CLINIC | Age: 34
End: 2019-10-30

## 2019-10-30 DIAGNOSIS — M67.40 GANGLION CYST: Primary | ICD-10-CM

## 2019-10-30 DIAGNOSIS — M67.432 GANGLION CYST OF WRIST, LEFT: ICD-10-CM

## 2019-10-30 NOTE — TELEPHONE ENCOUNTER
----- Message from Vanessa Chan sent at 10/29/2019  2:15 PM EDT -----  Regarding: Referral Request  Contact: [phone]  Good afternoon, I wanted to see if Dr. Salo Merida would be able to complete a referral for [facility] for me? My secondary [place] policy is denying the claims from 9/10/19 and 9/17/19 stating I need a referral from my PCP.  I was seen by Dr. Wilmer Lao for the Ganglion cyst on my left wrist.

## 2019-10-31 NOTE — TELEPHONE ENCOUNTER
A referral order was placed again. Initially placed on 8/2/19. She likely needed referral approval from her insurance before seeing Dr. Jennifer Valle.

## 2019-11-01 ENCOUNTER — ROUTINE PRENATAL (OUTPATIENT)
Dept: OBGYN CLINIC | Age: 34
End: 2019-11-01

## 2019-11-01 VITALS
SYSTOLIC BLOOD PRESSURE: 110 MMHG | HEIGHT: 68 IN | BODY MASS INDEX: 25.82 KG/M2 | DIASTOLIC BLOOD PRESSURE: 70 MMHG | WEIGHT: 170.4 LBS

## 2019-11-01 DIAGNOSIS — Z3A.10 10 WEEKS GESTATION OF PREGNANCY: ICD-10-CM

## 2019-11-01 NOTE — PROGRESS NOTES
BOY!! Normal anatomy scan today, post placenta. Severe gas pain last weekend, has 2 BMs, then resolved spontaneously. 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  Flu shot - declines.

## 2019-11-01 NOTE — PROGRESS NOTES
Pt doing well today. Did have concerns that started 10-26-19 some mild gas pains. 10-27-19 gas pains increased in severity, belly firm and cramping that radiated form bottom of belly to neck. 10-28-19 symptoms continue and worsened-did not go to work. Had 2 firm, brown BM and patient started Fiber gummies PO.   10-29-19  Symptoms decreased significantly, no belly firmness, occassional cramping. Symptoms completely resolved since including today. Encouraged good hydration, fresh fruits and veggies, if symptoms reoccur severely  Including radiating up chest to neck to ER. Dr. Shira Diza informed. Ultrasound: FETAL SURVEY  A SINGLE VIABLE IUP AT 20W1D IS SEEN. FETAL CARDIAC MOTION OBSERVED. FETAL ANATOMY WAS WELL VISUALIZED AND APPEARS WNL. NO ABNORMALITIES WERE SEEN ON TODAYS EXAM.  APPROPRIATE GROWTH MEASURED. SIZE = DATES. KOKI, PLACENTA AND CERVIX APPEAR WITHIN NORMAL LIMITS.   GENDER: MALE

## 2019-11-21 NOTE — PATIENT INSTRUCTIONS
Weeks 22 to 26 of Your Pregnancy: Care Instructions  Your Care Instructions    As you enter your 7th month of pregnancy at week 26, your baby's lungs are growing stronger and getting ready to breathe. You may notice that your baby responds to the sound of your or your partner's voice. You may also notice that your baby does less turning and twisting and more squirming or jerking. Jerking often means that your baby has the hiccups. Hiccups are perfectly normal and are only temporary. You may want to think about attending a childbirth preparation class. This is also a good time to start thinking about whether you want to have pain medicine during labor. Most pregnant women are tested for gestational diabetes between weeks 25 and 28. Gestational diabetes occurs when your blood sugar level gets too high when you're pregnant. The test is important, because you can have gestational diabetes and not know it. But the condition can cause problems for your baby. Follow-up care is a key part of your treatment and safety. Be sure to make and go to all appointments, and call your doctor if you are having problems. It's also a good idea to know your test results and keep a list of the medicines you take. How can you care for yourself at home? Ease discomfort from your baby's kicking  · Change your position. Sometimes this will cause your baby to change position too. · Take a deep breath while you raise your arm over your head. Then breathe out while you drop your arm. Do Kegel exercises to prevent urine from leaking  · You can do Kegel exercises while you stand or sit. ? Squeeze the same muscles you would use to stop your urine. Your belly and thighs should not move. ? Hold the squeeze for 3 seconds, and then relax for 3 seconds. ? Start with 3 seconds. Then add 1 second each week until you are able to squeeze for 10 seconds. ? Repeat the exercise 10 to 15 times for each session.  Do three or more sessions each day.  Ease or reduce swelling in your feet, ankles, hands, and fingers  · If your fingers are puffy, take off your rings. · Do not eat high-salt foods, such as potato chips. · Prop up your feet on a stool or couch as much as possible. Sleep with pillows under your feet. · Do not stand for long periods of time or wear tight shoes. · Wear support stockings. Where can you learn more? Go to http://valerie-mariana.info/. Enter G264 in the search box to learn more about \"Weeks 22 to 26 of Your Pregnancy: Care Instructions. \"  Current as of: May 29, 2019  Content Version: 12.2  © 6381-3507 [employer]. Care instructions adapted under license by [employer] (which disclaims liability or warranty for this information). If you have questions about a medical condition or this instruction, always ask your healthcare professional. [address] any warranty or liability for your use of this information.

## 2019-11-22 ENCOUNTER — ROUTINE PRENATAL (OUTPATIENT)
Dept: OBGYN CLINIC | Age: 34
End: 2019-11-22

## 2019-11-22 VITALS
SYSTOLIC BLOOD PRESSURE: 118 MMHG | HEIGHT: 68 IN | WEIGHT: 176 LBS | BODY MASS INDEX: 26.67 KG/M2 | DIASTOLIC BLOOD PRESSURE: 70 MMHG

## 2019-11-22 DIAGNOSIS — Z34.90 PREGNANCY, UNSPECIFIED GESTATIONAL AGE: Primary | ICD-10-CM

## 2019-11-22 DIAGNOSIS — Z3A.10 10 WEEKS GESTATION OF PREGNANCY: ICD-10-CM

## 2019-11-22 PROBLEM — Z37.9 NORMAL LABOR: Status: RESOLVED | Noted: 2017-08-12 | Resolved: 2019-11-22

## 2019-11-22 PROBLEM — O28.9 ABNORMAL ANTENATAL TEST: Status: RESOLVED | Noted: 2017-08-13 | Resolved: 2019-11-22

## 2019-12-19 ENCOUNTER — ROUTINE PRENATAL (OUTPATIENT)
Dept: OBGYN CLINIC | Age: 34
End: 2019-12-19

## 2019-12-19 VITALS — WEIGHT: 182.4 LBS | BODY MASS INDEX: 27.73 KG/M2 | DIASTOLIC BLOOD PRESSURE: 68 MMHG | SYSTOLIC BLOOD PRESSURE: 102 MMHG

## 2019-12-19 DIAGNOSIS — Z34.90 PREGNANCY, UNSPECIFIED GESTATIONAL AGE: Primary | ICD-10-CM

## 2019-12-19 DIAGNOSIS — Z23 ENCOUNTER FOR IMMUNIZATION: ICD-10-CM

## 2019-12-19 LAB
ANTIBODY SCREEN, EXTERNAL: NEGATIVE
GTT, 1 HR, GLUCOLA, EXTERNAL: 87
HBSAG, EXTERNAL: NEGATIVE
HCT, EXTERNAL: 31.6
HGB, EXTERNAL: 10.5
PLATELET CNT,   EXTERNAL: 230
T. PALLIDUM, EXTERNAL: NEGATIVE

## 2019-12-19 NOTE — PATIENT INSTRUCTIONS
Weeks 26 to 30 of Your Pregnancy: Care Instructions  Your Care Instructions    You are now in your last trimester of pregnancy. Your baby is growing rapidly. And you'll probably feel your baby moving around more often. Your doctor may ask you to count your baby's kicks. Your back may ache as your body gets used to your baby's size and length. If you haven't already had the Tdap shot during this pregnancy, talk to your doctor about getting it. It will help protect your  against pertussis infection. During this time, it's important to take care of yourself and pay attention to what your body needs. If you feel sexual, explore ways to be close with your partner that match your comfort and desire. Use the tips provided in this care sheet to find ways to be sexual in your own way. Follow-up care is a key part of your treatment and safety. Be sure to make and go to all appointments, and call your doctor if you are having problems. It's also a good idea to know your test results and keep a list of the medicines you take. How can you care for yourself at home? Take it easy at work  · Take frequent breaks. If possible, stop working when you are tired, and rest during your lunch hour. · Take bathroom breaks every 2 hours. · Change positions often. If you sit for long periods, stand up and walk around. · When you stand for a long time, keep one foot on a low stool with your knee bent. After standing a lot, sit with your feet up. · Avoid fumes, chemicals, and tobacco smoke. Be sexual in your own way  · Having sex during pregnancy is okay, unless your doctor tells you not to. · You may be very interested in sex, or you may have no interest at all. · Your growing belly can make it hard to find a good position during intercourse. [place] and explore. · You may get cramps in your uterus when your partner touches your breasts.   · A back rub may relieve the backache or cramps that sometimes follow orgasm. Learn about  labor  · Watch for signs of  labor. You may be going into labor if:  ? You have menstrual-like cramps, with or without nausea. ? You have about 6 or more contractions in 1 hour, even after you have had a glass of water and are resting. ? You have a low, dull backache that does not go away when you change your position. ? You have pain or pressure in your pelvis that comes and goes in a pattern. ? You have intestinal cramping or flu-like symptoms, with or without diarrhea.  ? You notice an increase or change in your vaginal discharge. Discharge may be heavy, mucus-like, watery, or streaked with blood. ? Your water breaks. · If you think you have  labor:  ? Drink 2 or 3 glasses of water or juice. Not drinking enough fluids can cause contractions. ? Stop what you are doing, and empty your bladder. Then lie down on your left side for at least 1 hour. ? While lying on your side, find your breast bone. Put your fingers in the soft spot just below it. Move your fingers down toward your belly button to find the top of your uterus. Check to see if it is tight. ? Contractions can be weak or strong. Record your contractions for an hour. Time a contraction from the start of one contraction to the start of the next one.  ? Single or several strong contractions without a pattern are called [place]-Morrison contractions. They are practice contractions but not the start of labor. They often stop if you change what you are doing. ? Call your doctor if you have regular contractions. Where can you learn more? Go to http://valerie-mariana.info/. Enter Y933 in the search box to learn more about \"Weeks 26 to 30 of Your Pregnancy: Care Instructions. \"  Current as of: May 29, 2019  Content Version: 12.2  © 2310-8112 [employer]. Care instructions adapted under license by [employer] (which disclaims liability or warranty for this information).  If you have questions about a medical condition or this instruction, always ask your healthcare professional. [address] any warranty or liability for your use of this information. Vaccine Information Statement     Tdap (Tetanus, Diphtheria, Pertussis) Vaccine: What You Need to Know    Many Vaccine Information Statements are available in Portuguese and other languages. See www.immunize.org/vis. Hojas de Información Sobre Vacunas están disponibles en español y en muchos otros idiomas. Visite [facility]    1. Why get vaccinated? Tetanus, diphtheria, and pertussis are very serious diseases. Tdap vaccine can protect us from these diseases. And, Tdap vaccine given to pregnant women can protect  babies against pertussis. TETANUS (Lockjaw) is rare in the [facility] today. It causes painful muscle tightening and stiffness, usually all over the body.  It can lead to tightening of muscles in the head and neck so you cant open your mouth, swallow, or sometimes even breathe. Tetanus kills about 1 out of 10 people who are infected even after receiving the best medical care. DIPHTHERIA is also rare in the [facility] today. It can cause a thick coating to form in the back of the throat.  It can lead to breathing problems, heart failure, paralysis, and death. PERTUSSIS (Whooping Cough) causes severe coughing spells, which can cause difficulty breathing, vomiting, and disturbed sleep.  It can also lead to weight loss, incontinence, and rib fractures. Up to 2 in 100 adolescents and 5 in 100 adults with pertussis are hospitalized or have complications, which could include pneumonia or death. These diseases are caused by bacteria. Diphtheria and pertussis are spread from person to person through secretions from coughing or sneezing. Tetanus enters the body through cuts, scratches, or wounds.     Before vaccines, as many as 200,000 cases of diphtheria, 200,000 cases of pertussis, and hundreds of cases of tetanus, were reported in the [place] each year. Since vaccination began, reports of cases for tetanus and diphtheria have dropped by about 99% and for pertussis by about 80%. 2. Tdap vaccine    Tdap vaccine can protect adolescents and adults from tetanus, diphtheria, and pertussis. One dose of Tdap is routinely given at age 6 or 15. People who did not get Tdap at that age should get it as soon as possible. Tdap is especially important for health care professionals and anyone having close contact with a baby younger than 12 months. Pregnant women should get a dose of Tdap during every pregnancy, to protect the  from pertussis. Infants are most at risk for severe, life-threatening complications from pertussis. Another vaccine, called Td, protects against tetanus and diphtheria, but not pertussis. A Td booster should be given every 10 years. Tdap may be given as one of these boosters if you have never gotten Tdap before. Tdap may also be given after a severe cut or burn to prevent tetanus infection. Your doctor or the person giving you the vaccine can give you more information. Tdap may safely be given at the same time as other vaccines. 3. Some people should not get this vaccine     A person who has ever had a life-threatening allergic reaction after a previous dose of any diphtheria, tetanus or pertussis containing vaccine, OR has a severe allergy to any part of this vaccine, should not get Tdap vaccine. Tell the person giving the vaccine about any severe allergies.  Anyone who had coma or long repeated seizures within 7 days after a childhood dose of DTP or DTaP, or a previous dose of Tdap, should not get Tdap, unless a cause other than the vaccine was found. They can still get Td.      Talk to your doctor if you:  - have seizures or another nervous system problem,  - had severe pain or swelling after any vaccine containing diphtheria, tetanus or pertussis,   - ever had a condition called Guillain Barré Syndrome (GBS),  - arent feeling well on the day the shot is scheduled. 4. Risks    With any medicine, including vaccines, there is a chance of side effects. These are usually mild and go away on their own. Serious reactions are also possible but are rare. Most people who get Tdap vaccine do not have any problems with it. Mild Problems following Tdap  (Did not interfere with activities)   Pain where the shot was given (about 3 in 4 adolescents or 2 in 3 adults)   Redness or swelling where the shot was given (about 1 person in 5)   Mild fever of at least 100.4°F (up to about 1 in 25 adolescents or 1 in 100 adults)   Headache (about 3 or 4 people in 10)   Tiredness (about 1 person in 3 or 4)   Nausea, vomiting, diarrhea, stomach ache (up to 1 in 4 adolescents or 1 in 10 adults)   Chills,  sore joints (about 1 person in 10)   Body aches (about 1 person in 3 or 4)    Rash, swollen glands (uncommon)    Moderate Problems following Tdap  (Interfered with activities, but did not require medical attention)   Pain where the shot was given (up to 1 in 5 or 6)    Redness or swelling where the shot was given (up to about 1 in 16 adolescents or 1 in 12 adults)   Fever over 102°F (about 1 in 100 adolescents or 1 in 250 adults)   Headache (about 1 in 7 adolescents or 1 in 10 adults)   Nausea, vomiting, diarrhea, stomach ache (up to 1 or 3 people in 100)   Swelling of the entire arm where the shot was given (up to about 1 in 500). Severe Problems following Tdap  (Unable to perform usual activities; required medical attention)   Swelling, severe pain, bleeding, and redness in the arm where the shot was given (rare). Problems that could happen after any vaccine:     People sometimes faint after a medical procedure, including vaccination.  Sitting or lying down for about 15 minutes can help prevent fainting, and injuries caused by a fall. Tell your doctor if you feel dizzy, or have vision changes or ringing in the ears.  Some people get severe pain in the shoulder and have difficulty moving the arm where a shot was given. This happens very rarely.  Any medication can cause a severe allergic reaction. Such reactions from a vaccine are very rare, estimated at fewer than 1 in a million doses, and would happen within a few minutes to a few hours after the vaccination. As with any medicine, there is a very remote chance of a vaccine causing a serious injury or death. The safety of vaccines is always being monitored. For more information, visit: www.cdc.gov/vaccinesafety/    5. What if there is a serious problem? What should I look for?  Look for anything that concerns you, such as signs of a severe allergic reaction, very high fever, or unusual behavior.  Signs of a severe allergic reaction can include hives, swelling of the face and throat, difficulty breathing, a fast heartbeat, dizziness, and weakness. These would usually start a few minutes to a few hours after the vaccination. What should I do?  If you think it is a severe allergic reaction or other emergency that cant wait, call 9-1-1 or get the person to the nearest hospital. Otherwise, call your doctor.  Afterward, the reaction should be reported to the Vaccine Adverse Event Reporting System (VAERS). Your doctor might file this report, or you can do it yourself through the VAERS web site at www.vaers. hhs.gov, or by calling 2-943.219.7493. VAERS does not give medical advice. 6. The National Vaccine Injury Compensation Program    The [facility] Vaccine Injury Compensation Program (VICP) is a federal program that was created to compensate people who may have been injured by certain vaccines.     Persons who believe they may have been injured by a vaccine can learn about the program and about filing a claim by calling 0-192.912.9016 or visiting the [employer] website at www.[facility].gov/vaccinecompensation. There is a time limit to file a claim for compensation. 7. How can I learn more?  Ask your doctor. He or she can give you the vaccine package insert or suggest other sources of information.  Call your local or state health department.  Contact the Centers for Disease Control and Prevention (CDC):  - Call 6-238.526.9748 (1-800-CDC-INFO) or  - Visit CDCs website at www.cdc.gov/vaccines      Vaccine Information Statement   Tdap Vaccine  (2/24/2015)  [address] [id number]    Department of Health and Human Services  Centers for Disease Control and Prevention    Office Use Only

## 2019-12-19 NOTE — PROGRESS NOTES
Will return for GDS @ 1pm today  A+, no rhogam indicated  Declines TDAP  Pt c/o intermittent pain R buttock pain, worsens with activity. Unable to void prior to today's Frankey Sea is a 29 y.o. female who presents for routine immunizations. She denies any symptoms , reactions or allergies that would exclude them from being immunized today. Risks and adverse reactions were discussed and the VIS was given to them. All questions were addressed. She was observed for 15 min post injection. There were no reactions observed.     Elvis Baum, KARELN

## 2019-12-24 LAB
BASOPHILS # BLD AUTO: 0 X10E3/UL (ref 0–0.2)
BASOPHILS NFR BLD AUTO: 1 %
BLD GP AB SCN SERPL QL: NEGATIVE
EOSINOPHIL # BLD AUTO: 0.1 X10E3/UL (ref 0–0.4)
EOSINOPHIL NFR BLD AUTO: 1 %
ERYTHROCYTE [DISTWIDTH] IN BLOOD BY AUTOMATED COUNT: 13.4 % (ref 12.3–15.4)
GLUCOSE 1H P 50 G GLC PO SERPL-MCNC: 87 MG/DL (ref 65–129)
HCT VFR BLD AUTO: 31.6 % (ref 34–46.6)
HGB BLD-MCNC: 10.5 G/DL (ref 11.1–15.9)
HIV 1+2 AB+HIV1 P24 AG SERPL QL IA: NON REACTIVE
IMM GRANULOCYTES # BLD AUTO: 0.1 X10E3/UL (ref 0–0.1)
IMM GRANULOCYTES NFR BLD AUTO: 1 %
LYMPHOCYTES # BLD AUTO: 1.6 X10E3/UL (ref 0.7–3.1)
LYMPHOCYTES NFR BLD AUTO: 23 %
MCH RBC QN AUTO: 30.8 PG (ref 26.6–33)
MCHC RBC AUTO-ENTMCNC: 33.2 G/DL (ref 31.5–35.7)
MCV RBC AUTO: 93 FL (ref 79–97)
MONOCYTES # BLD AUTO: 0.4 X10E3/UL (ref 0.1–0.9)
MONOCYTES NFR BLD AUTO: 6 %
NEUTROPHILS # BLD AUTO: 4.8 X10E3/UL (ref 1.4–7)
NEUTROPHILS NFR BLD AUTO: 68 %
PLATELET # BLD AUTO: 230 X10E3/UL (ref 150–450)
RBC # BLD AUTO: 3.41 X10E6/UL (ref 3.77–5.28)
TREPONEMA PALLIDUM IGG+IGM AB [PRESENCE] IN SERUM OR PLASMA BY IMMUNOASSAY: NON REACTIVE
WBC # BLD AUTO: 6.9 X10E3/UL (ref 3.4–10.8)

## 2019-12-26 LAB — HIV, EXTERNAL: NEGATIVE

## 2020-01-03 ENCOUNTER — ROUTINE PRENATAL (OUTPATIENT)
Dept: OBGYN CLINIC | Age: 35
End: 2020-01-03

## 2020-01-03 VITALS — SYSTOLIC BLOOD PRESSURE: 122 MMHG | WEIGHT: 188 LBS | DIASTOLIC BLOOD PRESSURE: 82 MMHG | BODY MASS INDEX: 28.59 KG/M2

## 2020-01-03 DIAGNOSIS — Z34.83 PRENATAL CARE, SUBSEQUENT PREGNANCY IN THIRD TRIMESTER: Primary | ICD-10-CM

## 2020-01-03 NOTE — PATIENT INSTRUCTIONS

## 2020-01-03 NOTE — PROGRESS NOTES
glucola normal.  Active FM. Some [address], 1-2 times a day and more at night. PTL prec reviewed. Pelvic pain.

## 2020-01-17 ENCOUNTER — ROUTINE PRENATAL (OUTPATIENT)
Dept: OBGYN CLINIC | Age: 35
End: 2020-01-17

## 2020-01-17 VITALS
BODY MASS INDEX: 28.76 KG/M2 | SYSTOLIC BLOOD PRESSURE: 118 MMHG | WEIGHT: 189.8 LBS | HEIGHT: 68 IN | DIASTOLIC BLOOD PRESSURE: 72 MMHG

## 2020-01-17 DIAGNOSIS — Z3A.10 10 WEEKS GESTATION OF PREGNANCY: Primary | ICD-10-CM

## 2020-01-17 NOTE — PATIENT INSTRUCTIONS

## 2020-01-17 NOTE — PROGRESS NOTES
Doing well, but getting more uncomfortable. Active FM. Still with few [address] - more in early AM and about ~5 per day. Discussed pt bringing in list of acceptable blood products. Need to meet with CNM at next visit.

## 2020-01-29 ENCOUNTER — ROUTINE PRENATAL (OUTPATIENT)
Dept: OBGYN CLINIC | Age: 35
End: 2020-01-29

## 2020-01-29 VITALS
HEIGHT: 68 IN | BODY MASS INDEX: 28.79 KG/M2 | WEIGHT: 190 LBS | SYSTOLIC BLOOD PRESSURE: 118 MMHG | DIASTOLIC BLOOD PRESSURE: 70 MMHG

## 2020-01-29 DIAGNOSIS — Z34.83 PRENATAL CARE, SUBSEQUENT PREGNANCY IN THIRD TRIMESTER: Primary | ICD-10-CM

## 2020-01-29 NOTE — PATIENT INSTRUCTIONS
Weeks 32 to 34 of Your Pregnancy: Care Instructions  Your Care Instructions    During the last few weeks of your pregnancy, you may have more aches and pains. It's important to rest when you can. Your growing baby is putting more pressure on your bladder. So you may need to urinate more often. Hemorrhoids are also common. These are painful, itchy veins in the rectal area. In the 36th week, most women have a test for group B streptococcus (GBS). GBS is a common bacteria that can live in the vagina and rectum. It can make your baby sick after birth. If you test positive, you will get antibiotics during labor. These will keep your baby from getting the bacteria. You may want to talk with your doctor about banking your baby's umbilical cord blood. This is the blood left in the cord after birth. If you want to save this blood, you must arrange it ahead of time. You can't decide at the last minute. If you haven't already had the Tdap shot during this pregnancy, talk to your doctor about getting it. It will help protect your  against pertussis infection. Follow-up care is a key part of your treatment and safety. Be sure to make and go to all appointments, and call your doctor if you are having problems. It's also a good idea to know your test results and keep a list of the medicines you take. How can you care for yourself at home? Ease hemorrhoids  · Get more liquids, fruits, vegetables, and fiber in your diet. This will help keep your stools soft. · Avoid sitting for too long. Lie on your left side several times a day. · Clean yourself with soft, moist toilet paper. Or you can use witch hazel pads or personal hygiene pads. · If you are uncomfortable, try ice packs. Or you can sit in a warm sitz bath. Do these for 20 minutes at a time, as needed. · Use hydrocortisone cream for pain and itching. Two examples are Anusol and Preparation H Hydrocortisone.   · Ask your doctor about taking an over-the-counter stool softener. Consider breastfeeding  · Experts recommend that women breastfeed for 1 year or longer. Breast milk is the perfect food for babies. · Breast milk is easier for babies to digest than formula. And it is always available, just the right temperature, and free. · Breast milk may help protect your child from some health problems.  babies are less likely than formula-fed babies to:  ? Get ear infections, colds, diarrhea, and pneumonia. ? Be obese or get diabetes later in life. · Women who breastfeed have less bleeding after the birth. Their uteruses also shrink back faster. · Some women who breastfeed lose weight faster. Making milk burns calories. · Breastfeeding can lower your risk of breast cancer, ovarian cancer, and osteoporosis. Decide about circumcision for boys  · As you make this decision, it may help to think about your personal, Jewish, and family traditions. You get to decide if you will keep your son's penis natural or if he will be circumcised. · If you decide that you would like to have your baby circumcised, talk with your doctor. You can share your concerns about pain. And you can discuss your preferences for anesthesia. Where can you learn more? Go to http://valerie-mariana.info/. Enter K335 in the search box to learn more about \"Weeks 32 to 34 of Your Pregnancy: Care Instructions. \"  Current as of: May 29, 2019  Content Version: 12.2  © 6702-5708 [employer]. Care instructions adapted under license by [employer] (which disclaims liability or warranty for this information). If you have questions about a medical condition or this instruction, always ask your healthcare professional. [place] [address] any warranty or liability for your use of this information.

## 2020-02-14 ENCOUNTER — ROUTINE PRENATAL (OUTPATIENT)
Dept: OBGYN CLINIC | Age: 35
End: 2020-02-14

## 2020-02-14 VITALS
BODY MASS INDEX: 29.64 KG/M2 | DIASTOLIC BLOOD PRESSURE: 80 MMHG | WEIGHT: 195.6 LBS | HEIGHT: 68 IN | SYSTOLIC BLOOD PRESSURE: 116 MMHG

## 2020-02-14 DIAGNOSIS — Z3A.10 10 WEEKS GESTATION OF PREGNANCY: Primary | ICD-10-CM

## 2020-02-14 NOTE — PATIENT INSTRUCTIONS

## 2020-02-14 NOTE — PROGRESS NOTES
Irregular painful ctx  Pelvic pain. Trouble sleeping, just very uncomfortable. Increase in d/c with unusual odor  Active FM. Desires IOL by Nydia 39 if no delivery by 3/20  GBS and vscan @ [facility].

## 2020-02-20 ENCOUNTER — ROUTINE PRENATAL (OUTPATIENT)
Dept: OBGYN CLINIC | Age: 35
End: 2020-02-20

## 2020-02-20 VITALS — BODY MASS INDEX: 29.95 KG/M2 | DIASTOLIC BLOOD PRESSURE: 76 MMHG | WEIGHT: 197 LBS | SYSTOLIC BLOOD PRESSURE: 118 MMHG

## 2020-02-20 DIAGNOSIS — Z34.03 ENCOUNTER FOR SUPERVISION OF NORMAL FIRST PREGNANCY IN THIRD TRIMESTER: ICD-10-CM

## 2020-02-20 DIAGNOSIS — N91.2 AMENORRHEA: ICD-10-CM

## 2020-02-20 DIAGNOSIS — N89.8 VAGINAL DISCHARGE: ICD-10-CM

## 2020-02-20 DIAGNOSIS — Z36.85 ANTENATAL SCREENING FOR STREPTOCOCCUS B: Primary | ICD-10-CM

## 2020-02-20 NOTE — PROGRESS NOTES
Increased discharge with a slight odor- nuswab- rcx pending no overt concerns  Positive fetal movement.    [facility]    Fundus sits forward pt having lots of lower back and pelvic pain- recommend pregnancy belt for comfort

## 2020-02-20 NOTE — PATIENT INSTRUCTIONS

## 2020-02-22 LAB
GP B STREP DNA SPEC QL NAA+PROBE: NEGATIVE
GRBS, EXTERNAL: NEGATIVE

## 2020-02-23 LAB
A VAGINAE DNA VAG QL NAA+PROBE: ABNORMAL SCORE
BVAB2 DNA VAG QL NAA+PROBE: ABNORMAL SCORE
C ALBICANS DNA VAG QL NAA+PROBE: NEGATIVE
C GLABRATA DNA VAG QL NAA+PROBE: NEGATIVE
MEGA1 DNA VAG QL NAA+PROBE: ABNORMAL SCORE
T VAGINALIS DNA VAG QL NAA+PROBE: NEGATIVE

## 2020-02-23 RX ORDER — METRONIDAZOLE 500 MG/1
500 TABLET ORAL 2 TIMES DAILY
Qty: 14 TAB | Refills: 0 | Status: SHIPPED | OUTPATIENT
Start: 2020-02-23 | End: 2020-03-01

## 2020-02-28 ENCOUNTER — ROUTINE PRENATAL (OUTPATIENT)
Dept: OBGYN CLINIC | Age: 35
End: 2020-02-28

## 2020-02-28 VITALS
DIASTOLIC BLOOD PRESSURE: 72 MMHG | BODY MASS INDEX: 30.31 KG/M2 | HEART RATE: 56 BPM | RESPIRATION RATE: 16 BRPM | SYSTOLIC BLOOD PRESSURE: 114 MMHG | HEIGHT: 68 IN | WEIGHT: 200 LBS

## 2020-02-28 DIAGNOSIS — Z3A.10 10 WEEKS GESTATION OF PREGNANCY: ICD-10-CM

## 2020-02-28 NOTE — PROGRESS NOTES
Pt states she is doing well, overall. She is c/o right hip pain, that has become progressively worse throughout 3rd trimester. Pt reports lower abdomen feels numb to touch, can still feel pressure if she pushes on stomach. GBS neg  Cervix 2cm last week. [facility] getting stronger.     Cephalic by Vivien Billings

## 2020-03-06 ENCOUNTER — ROUTINE PRENATAL (OUTPATIENT)
Dept: OBGYN CLINIC | Age: 35
End: 2020-03-06

## 2020-03-06 VITALS
HEIGHT: 68 IN | DIASTOLIC BLOOD PRESSURE: 70 MMHG | SYSTOLIC BLOOD PRESSURE: 102 MMHG | WEIGHT: 200 LBS | BODY MASS INDEX: 30.31 KG/M2

## 2020-03-06 DIAGNOSIS — Z3A.10 10 WEEKS GESTATION OF PREGNANCY: Primary | ICD-10-CM

## 2020-03-06 RX ORDER — METRONIDAZOLE 500 MG/1
TABLET ORAL
COMMUNITY
Start: 2020-03-03 | End: 2020-03-19

## 2020-03-06 NOTE — PROGRESS NOTES
Still very uncomfortable. More [address] Will check cervix and possible membrane sweep next week.

## 2020-03-13 ENCOUNTER — ROUTINE PRENATAL (OUTPATIENT)
Dept: OBGYN CLINIC | Age: 35
End: 2020-03-13

## 2020-03-13 VITALS — SYSTOLIC BLOOD PRESSURE: 122 MMHG | BODY MASS INDEX: 30.41 KG/M2 | WEIGHT: 200 LBS | DIASTOLIC BLOOD PRESSURE: 82 MMHG

## 2020-03-13 DIAGNOSIS — Z3A.10 10 WEEKS GESTATION OF PREGNANCY: Primary | ICD-10-CM

## 2020-03-13 NOTE — PROGRESS NOTES
[facility], stronger, but still nothing regular. Declines sweep today. Active FM.

## 2020-03-18 ENCOUNTER — TELEPHONE (OUTPATIENT)
Dept: OBGYN CLINIC | Age: 35
End: 2020-03-18

## 2020-03-18 ENCOUNTER — HOSPITAL ENCOUNTER (INPATIENT)
Age: 35
LOS: 1 days | Discharge: HOME | End: 2020-03-19
Attending: OBSTETRICS & GYNECOLOGY | Admitting: OBSTETRICS & GYNECOLOGY
Payer: COMMERCIAL

## 2020-03-18 ENCOUNTER — ROUTINE PRENATAL (OUTPATIENT)
Dept: OBGYN CLINIC | Age: 35
End: 2020-03-18

## 2020-03-18 DIAGNOSIS — Z34.90 PREGNANCY, UNSPECIFIED GESTATIONAL AGE: Primary | ICD-10-CM

## 2020-03-18 LAB
ERYTHROCYTE [DISTWIDTH] IN BLOOD BY AUTOMATED COUNT: 14.9 % (ref 11.5–14.5)
HCT VFR BLD AUTO: 34.3 % (ref 35–47)
HGB BLD-MCNC: 11 G/DL (ref 11.5–16)
MCH RBC QN AUTO: 28.3 PG (ref 26–34)
MCHC RBC AUTO-ENTMCNC: 32.1 G/DL (ref 30–36.5)
MCV RBC AUTO: 88.2 FL (ref 80–99)
NRBC # BLD: 0 K/UL (ref 0–0.01)
NRBC BLD-RTO: 0 PER 100 WBC
PLATELET # BLD AUTO: 201 K/UL (ref 150–400)
PMV BLD AUTO: 11.6 FL (ref 8.9–12.9)
RBC # BLD AUTO: 3.89 M/UL (ref 3.8–5.2)
WBC # BLD AUTO: 8.4 K/UL (ref 3.6–11)

## 2020-03-18 PROCEDURE — 10907ZC DRAINAGE OF AMNIOTIC FLUID, THERAPEUTIC FROM PRODUCTS OF CONCEPTION, VIA NATURAL OR ARTIFICIAL OPENING: ICD-10-PCS | Performed by: OBSTETRICS & GYNECOLOGY

## 2020-03-18 PROCEDURE — 75410000000 HC DELIVERY VAGINAL/SINGLE

## 2020-03-18 PROCEDURE — 75410000003 HC RECOV DEL/VAG/CSECN EA 0.5 HR

## 2020-03-18 PROCEDURE — 36415 COLL VENOUS BLD VENIPUNCTURE: CPT

## 2020-03-18 PROCEDURE — 85027 COMPLETE CBC AUTOMATED: CPT

## 2020-03-18 PROCEDURE — 65410000002 HC RM PRIVATE OB

## 2020-03-18 PROCEDURE — 74011250636 HC RX REV CODE- 250/636

## 2020-03-18 PROCEDURE — 74011250637 HC RX REV CODE- 250/637: Performed by: OBSTETRICS & GYNECOLOGY

## 2020-03-18 PROCEDURE — 75410000002 HC LABOR FEE PER 1 HR

## 2020-03-18 RX ORDER — LIDOCAINE HYDROCHLORIDE 10 MG/ML
INJECTION INFILTRATION; PERINEURAL
Status: DISCONTINUED
Start: 2020-03-18 | End: 2020-03-18 | Stop reason: WASHOUT

## 2020-03-18 RX ORDER — HYDROCORTISONE ACETATE PRAMOXINE HCL 2.5; 1 G/100G; G/100G
CREAM TOPICAL AS NEEDED
Status: DISCONTINUED | OUTPATIENT
Start: 2020-03-18 | End: 2020-03-20 | Stop reason: HOSPADM

## 2020-03-18 RX ORDER — OXYTOCIN/0.9 % SODIUM CHLORIDE 30/500 ML
500 PLASTIC BAG, INJECTION (ML) INTRAVENOUS ONCE
Status: COMPLETED | OUTPATIENT
Start: 2020-03-18 | End: 2020-03-18

## 2020-03-18 RX ORDER — NALOXONE HYDROCHLORIDE 0.4 MG/ML
0.4 INJECTION, SOLUTION INTRAMUSCULAR; INTRAVENOUS; SUBCUTANEOUS AS NEEDED
Status: DISCONTINUED | OUTPATIENT
Start: 2020-03-18 | End: 2020-03-20 | Stop reason: HOSPADM

## 2020-03-18 RX ORDER — IBUPROFEN 400 MG/1
800 TABLET ORAL EVERY 8 HOURS
Status: DISCONTINUED | OUTPATIENT
Start: 2020-03-18 | End: 2020-03-20 | Stop reason: HOSPADM

## 2020-03-18 RX ORDER — DOCUSATE SODIUM 100 MG/1
100 CAPSULE, LIQUID FILLED ORAL
Status: DISCONTINUED | OUTPATIENT
Start: 2020-03-18 | End: 2020-03-20 | Stop reason: HOSPADM

## 2020-03-18 RX ORDER — OXYTOCIN/RINGER'S LACTATE 20/1000 ML
125-500 PLASTIC BAG, INJECTION (ML) INTRAVENOUS ONCE
Status: ACTIVE | OUTPATIENT
Start: 2020-03-18 | End: 2020-03-19

## 2020-03-18 RX ORDER — ACETAMINOPHEN 325 MG/1
650 TABLET ORAL
Status: DISCONTINUED | OUTPATIENT
Start: 2020-03-18 | End: 2020-03-20 | Stop reason: HOSPADM

## 2020-03-18 RX ORDER — OXYTOCIN/0.9 % SODIUM CHLORIDE 30/500 ML
PLASTIC BAG, INJECTION (ML) INTRAVENOUS
Status: COMPLETED
Start: 2020-03-18 | End: 2020-03-18

## 2020-03-18 RX ORDER — SODIUM CHLORIDE, SODIUM LACTATE, POTASSIUM CHLORIDE, CALCIUM CHLORIDE 600; 310; 30; 20 MG/100ML; MG/100ML; MG/100ML; MG/100ML
125 INJECTION, SOLUTION INTRAVENOUS CONTINUOUS
Status: DISCONTINUED | OUTPATIENT
Start: 2020-03-18 | End: 2020-03-20 | Stop reason: HOSPADM

## 2020-03-18 RX ADMIN — IBUPROFEN 800 MG: 400 TABLET, FILM COATED ORAL at 22:59

## 2020-03-18 RX ADMIN — OXYTOCIN-SODIUM CHLORIDE 0.9% IV SOLN 30 UNIT/500ML 30000 MILLI-UNITS/HR: 30-0.9/5 SOLUTION at 11:38

## 2020-03-18 RX ADMIN — IBUPROFEN 800 MG: 400 TABLET, FILM COATED ORAL at 13:36

## 2020-03-18 RX ADMIN — Medication 30000 MILLI-UNITS/HR: at 11:38

## 2020-03-18 NOTE — PROGRESS NOTES
TRANSFER - IN REPORT:    Verbal report received from JUAN CARLOS Gold RN (name) on [facility]  being received from L&D (unit) for routine progression of care      Report consisted of patients Situation, Background, Assessment and   Recommendations(SBAR). Information from the following report(s) SBAR was reviewed with the receiving nurse. Opportunity for questions and clarification was provided. Assessment completed upon patients arrival to unit and care assumed.

## 2020-03-18 NOTE — TELEPHONE ENCOUNTER
Patient of RL        44 w 6d    Patient called at 10:14am on normal nurse line. She is calling to say that 8:20 this morning she started with irregular contractions that were \"uncomfortable\" but not painful. She said around 9:15 they picked up in intensity but she is uncertain to how many minutes exactly they are apart. She said that they seem to be coming more non stop in the past 5 minutes. She is on her way to the office and will be there in 10 min. Advised patient to head straight to office.

## 2020-03-18 NOTE — PROGRESS NOTES
1103 Patient arrived from Dr Tan Grimm office in labor. Patient 9cm and Floyd Gonzalez in office    [phone] Dr Becca Reyes at bedside    1120 Dr Tona Jose at bedside    [facility] 53 REPORT:    Verbal report given to ROSSANA Rivero RN(name) on Carlota Gloss  being transferred to MIU(unit) for routine progression of care       Report consisted of patients Situation, Background, Assessment and   Recommendations(SBAR). Information from the following report(s) SBAR, Intake/Output, MAR and Recent Results was reviewed with the receiving nurse. Lines:   Peripheral IV 03/18/20 Anterior; Left Hand (Active)        Opportunity for questions and clarification was provided.       Patient transported with:   Registered Nurse

## 2020-03-18 NOTE — H&P
History and Physical    Patient: Jose Ivey MRN: [id number]  SSN: [id number]    YOB: 1985  Age: 29 y.o. Sex: female      Subjective:      Jose Ivey is a 29 y.o. female who presents for active labor from the office. She is feeling painful contractions every 2 minutes. ROM upon arrival to L&D. Primary Provider: Fito Caballero      *please call Dr. Greg Adamson for delivery if available      ( male [facility] Fails in 2017 at [facility])  [facility] by first tri ultrasound     Pregnancy Problems:  Pt accepts blood products in true emergency only: okay per pt to let /mother make decision if pt not able to (advanced directive in her chart)     Mild Fe def anemia:  Hgb 10.5 at 27 wks, on daily iron     IOB labs: A pos, normal  Genetic Screening: declines  Anatomy: BOY!!, normal anatomy, posterior placenta  Flu: declines  TDAP: given 19  Third Tri Labs: glucola 87, Hgb 10.5, remainder normal  GBS: neg     Pain mgmt. in labor: desires no epidural if able  Feeding: breast - breast pump came in  Circ: unsure  Social:  - Preet Jerome, Son Julia Milner (3yo). Would like to wait until cord stops pulsing before cutting. Past Medical History:   Diagnosis Date    Asthma     As a child    Chlamydia     15 years ago    Heart murmur     As a child     No past surgical history on file. Family History   Problem Relation Age of Onset    Other Mother         high cholesterol    Hypertension Father     Diabetes Father     Other Father         CHF    Cancer Other         lung     Social History     Tobacco Use    Smoking status: Never Smoker    Smokeless tobacco: Never Used   Substance Use Topics    Alcohol use: Not Currently     Comment: occ social      Prior to Admission medications    Medication Sig Start Date End Date Taking? Authorizing Provider   metroNIDAZOLE (FLAGYL) 500 mg tablet  3/3/20   Provider, Historical   PNV no.153/FA/om3/dha/epa/fish (PRENATAL GUMMIES PO) Take  by mouth.     Provider, Historical Allergies   Allergen Reactions    Penicillin G Hives     childhood       Review of Systems:  A comprehensive review of systems was negative except for that written in the History of Present Illness. Objective:     Vitals:    03/18/20 1109 03/18/20 1145 03/18/20 1201 03/18/20 1216   BP:  122/88 126/81 117/76   Pulse:  88 88 72   Resp:       Temp:       Weight: 200 lb (90.7 kg)      Height: 5' 8\" (1.727 m)           Physical Exam:  GENERAL: alert, cooperative,appears in active labor  LUNG: labored breathing with contractions only  HEART: regular rate and rhythm  ABDOMEN: soft, non-tender.  EFW 7 pounds  SKIN: Normal.  PSYCHIATRIC: non focal    Cervix: in office 9/C/0 with bulging bag of membranes    Assessment:     Hospital Problems  Date Reviewed: 3/18/2020          Codes Class Noted POA    Vaginal delivery ICD-10-CM: O80  ICD-9-CM: 050  3/18/2020 Unknown          Term labor    Plan:     Admit for delivery  Consents signed  GBS neg    Signed By: Brayden Bernstein MD     March 18, 2020

## 2020-03-18 NOTE — L&D DELIVERY NOTE
Delivery Summary    Patient: Vivek Stringer MRN: [id number]  SSN: [id number]    YOB: 1985  Age: 29 y.o. Sex: female       Information for the patient's :  Amelia ugalde [[id number]]     Uncomplicated  of vigorous male  after approximately 10 minutes of pushing, without neuraxial analgesia. Infant placed on maternal abdomen and cord clamped after pulsations ceseased and cut by the patient. Placenta delivered intact in less than 5 minutes. No perineal or labial lacerations were identified.   Fundus firm and below the umbilicus, lochia was normal.       Labor Events:    Labor: No    Steroids: None   Cervical Ripening Date/Time:       Cervical Ripening Type: None   Antibiotics During Labor: No   Rupture Identifier: Sac 1    Rupture Date/Time: 3/18/2020 11:13 AM   Rupture Type: AROM   Amniotic Fluid Volume:      Amniotic Fluid Description: Meconium    Amniotic Fluid Odor:      Induction: None       Induction Date/Time:        Indications for Induction:      Augmentation: None   Augmentation Date/Time:      Indications for Augmentation:     Labor complications: None       Additional complications:        Delivery Events:  Indications For Episiotomy:     Episiotomy: None   Perineal Laceration(s): None   Repaired:     Periurethral Laceration Location:      Repaired:     Labial Laceration Location:     Repaired:     Sulcal Laceration Location:     Repaired:     Vaginal Laceration Location:     Repaired:     Cervical Laceration Location:     Repaired:     Repair Suture: None   Number of Repair Packets:     Estimated Blood Loss (ml):  ml     Delivery Date: 3/18/2020    Delivery Time: 11:32 AM  Delivery Type: Vaginal, Spontaneous  Sex:  Male    Gestational Age: 37w11d   Delivery Clinician:  Howie Burt  Living Status: Living   Delivery Location: L&D Room 6          APGARS  One minute Five minutes Ten minutes   Skin color: 1   1        Heart rate: 2   2        Grimace: 2   2 Muscle tone: 2   2        Breathin   2        Totals: 8   9            Presentation: Vertex    Position: Left      Resuscitation Method:  Suctioning-bulb; Tactile Stimulation     Meconium Stained: Thick      Cord Information: 3 Vessels  Complications: None  Cord around:    Delayed cord clamping? Yes  Cord clamped date/time:3/18/2020 11:36 AM  Disposition of Cord Blood: Lab    Blood Gases Sent?: No    Placenta:  Date/Time: 3/18/2020 11:38 AM  Removal: Spontaneous      Appearance: Normal      Measurements:  Birth Weight:        Birth Length:        Head Circumference:        Chest Circumference:       Abdominal Girth: Other Providers:   ANGELICA FOSS , Obstetrician;Primary Nurse;Primary  Nurse;Nicu Nurse;Neonatologist;Anesthesiologist;Crna;Nurse Practitioner;Midwife;Nursery Nurse           Group B Strep:   Lab Results   Component Value Date/Time    GrBStrep, External Negative 2019     Information for the patient's :  Page ugalde [[id number]]   No results found for: ABORH, PCTABR, PCTDIG, BILI, ABORHEXT, ABORH    No results for input(s): PCO2CB, PO2CB, HCO3I, SO2I, IBD, PTEMPI, SPECTI, PHICB, ISITE, IDEV, IALLEN in the last 72 hours.

## 2020-03-19 VITALS
HEIGHT: 68 IN | DIASTOLIC BLOOD PRESSURE: 77 MMHG | SYSTOLIC BLOOD PRESSURE: 117 MMHG | TEMPERATURE: 98 F | RESPIRATION RATE: 16 BRPM | BODY MASS INDEX: 30.31 KG/M2 | WEIGHT: 200 LBS | HEART RATE: 85 BPM

## 2020-03-19 PROCEDURE — 74011250637 HC RX REV CODE- 250/637: Performed by: OBSTETRICS & GYNECOLOGY

## 2020-03-19 RX ORDER — IBUPROFEN 800 MG/1
800 TABLET ORAL
Qty: 40 TAB | Refills: 1 | Status: SHIPPED | OUTPATIENT
Start: 2020-03-19 | End: 2020-07-23

## 2020-03-19 RX ADMIN — IBUPROFEN 800 MG: 400 TABLET, FILM COATED ORAL at 15:19

## 2020-03-19 NOTE — DISCHARGE INSTRUCTIONS

## 2020-03-19 NOTE — DISCHARGE SUMMARY
Obstetrical Discharge Summary     Name: Odilon Otero MRN: [id number]  SSN: [id number]    YOB: 1985  Age: 29 y.o. Sex: female      Admit Date: 3/18/2020    Discharge Date: 3/19/2020     Admitting Physician: Sussy Armstrong MD     Attending Physician:  Amna Ambrose MD     Admission Diagnoses: Vaginal delivery [O80]    Discharge Diagnoses:   Information for the patient's :  Teresa ugalde [[id number]]   Delivery of a 7 lb 10.9 oz (3.485 kg) male infant via Vaginal, Spontaneous on 3/18/2020 at 11:32 AM  by Sussy Armstrong. Apgars were 8  and 9 . Additional Diagnoses:   Hospital Problems  Date Reviewed: 3/18/2020          Codes Class Noted POA    Vaginal delivery ICD-10-CM: O80  ICD-9-CM: 136  3/18/2020 Unknown             Lab Results   Component Value Date/Time    Rubella, External 14.5 imm 2017    GrBStrep, External Negative 2020       Hospital Course: Normal hospital course following the delivery. Disposition at Discharge: Home or self care    Discharged Condition: Stable    Patient Instructions:   Current Discharge Medication List      START taking these medications    Details   ibuprofen (MOTRIN) 800 mg tablet Take 1 Tab by mouth every eight (8) hours as needed for Pain. Qty: 40 Tab, Refills: 1         STOP taking these medications       metroNIDAZOLE (FLAGYL) 500 mg tablet Comments:   Reason for Stopping:         PNV no.153/FA/om3/dha/epa/fish (PRENATAL GUMMIES PO) Comments:   Reason for Stopping:               Reference my discharge instructions.     Follow-up Appointments   Procedures    FOLLOW UP VISIT Appointment in: 6 Weeks     Standing Status:   Standing     Number of Occurrences:   1     Order Specific Question:   Appointment in     Answer:   6 Weeks        Signed By:  Sussy Armstrong MD     2020

## 2020-03-19 NOTE — ROUTINE PROCESS
Bedside shift change report given to Camron Chacon RN (oncoming nurse) by Christen Alonso RN (offgoing nurse). Report included the following information SBAR.

## 2020-03-19 NOTE — PROGRESS NOTES
Post-Partum Day Number 1 Progress Note    Ajitfidel Sanchez     Assessment: Doing well, post partum day 1    Plan:  1. Continue routine postpartum and perineal care as well as maternal education. 2. Declines circ  3. Desires early discharge today. Pt cleared from my standpoint - okay to be discharged if baby can go. Will send ibuprofen rx to pharmacy. Information for the patient's :  Joaquin ugalde [[id number]]   Vaginal, Spontaneous   Patient doing well without significant complaint. Voiding without difficulty, normal lochia. Desires discharge today. Vitals:  Visit Vitals  /62 (BP 1 Location: Left arm, BP Patient Position: At rest)   Pulse 79   Temp 98.2 °F (36.8 °C)   Resp 16   Ht 5' 8\" (1.727 m)   Wt 200 lb (90.7 kg)   LMP 06/10/2019 (Approximate)   Breastfeeding Unknown   BMI 30.41 kg/m²     Temp (24hrs), Av.3 °F (36.8 °C), Min:97.8 °F (36.6 °C), Max:98.6 °F (37 °C)        Exam:   Patient without distress. Abdomen soft, fundus firm, nontender                Perineum with normal lochia noted. Lower extremities are negative for swelling, cords or tenderness.     Labs:     Lab Results   Component Value Date/Time    WBC 8.4 2020 11:16 AM    WBC 6.9 2019 10:28 AM    WBC 7.8 2019 10:40 AM    WBC 6.2 2019 01:30 AM    WBC 4.9 2018 09:45 AM    WBC 10.3 2017 11:15 PM    HGB 11.0 (L) 2020 11:16 AM    HGB 10.5 (L) 2019 10:28 AM    HGB 11.5 2019 10:40 AM    HGB 10.4 (L) 2019 01:30 AM    HGB 12.0 2018 09:45 AM    HGB 10.9 (L) 2017 12:28 AM    HGB 12.4 2017 11:15 PM    HCT 34.3 (L) 2020 11:16 AM    HCT 31.6 (L) 2019 10:28 AM    HCT 33.6 (L) 2019 10:40 AM    HCT 31.6 (L) 2019 01:30 AM    HCT 36.2 2018 09:45 AM    HCT 31.9 (L) 2017 12:28 AM    HCT 37.6 2017 11:15 PM    PLATELET 138  11:16 AM    PLATELET 648  10:28 AM    PLATELET 227 09/13/2019 10:40 AM    PLATELET 036 12/11/6111 01:30 AM    PLATELET 036 87/77/9632 09:45 AM    PLATELET 129 86/47/5194 11:15 PM    Hgb, External 10.5 12/19/2019    Hgb, External 10.5 05/30/2017    Hct, External 31.6 12/19/2019    Hct, External 31.6 05/30/2017    Platelet cnt., External 230 12/19/2019    Platelet cnt., External 227 01/09/2017       Recent Results (from the past 24 hour(s))   CBC W/O DIFF    Collection Time: 03/18/20 11:16 AM   Result Value Ref Range    WBC 8.4 3.6 - 11.0 K/uL    RBC 3.89 3.80 - 5.20 M/uL    HGB 11.0 (L) 11.5 - 16.0 g/dL    HCT 34.3 (L) 35.0 - 47.0 %    MCV 88.2 80.0 - 99.0 FL    MCH 28.3 26.0 - 34.0 PG    MCHC 32.1 30.0 - 36.5 g/dL    RDW 14.9 (H) 11.5 - 14.5 %    PLATELET 976 664 - 007 K/uL    MPV 11.6 8.9 - 12.9 FL    NRBC 0.0 0  WBC    ABSOLUTE NRBC 0.00 0.00 - 0.01 K/uL

## 2020-04-09 ENCOUNTER — TELEPHONE (OUTPATIENT)
Dept: OBGYN CLINIC | Age: 35
End: 2020-04-09

## 2020-04-09 NOTE — TELEPHONE ENCOUNTER
Called pt to check on her postpartum. She is doing well, they just moved into her new house this week. Baby nursing. Mood is good, feels better than before delivery. Pt will call and make 6 week PP virtual visit.     Juventino Smith MD

## 2020-05-07 ENCOUNTER — PATIENT MESSAGE (OUTPATIENT)
Dept: INTERNAL MEDICINE CLINIC | Facility: CLINIC | Age: 35
End: 2020-05-07

## 2020-05-07 ENCOUNTER — VIRTUAL VISIT (OUTPATIENT)
Dept: OBGYN CLINIC | Age: 35
End: 2020-05-07

## 2020-05-07 VITALS — BODY MASS INDEX: 26.61 KG/M2 | WEIGHT: 175 LBS

## 2020-05-07 DIAGNOSIS — R10.2 PELVIC PAIN IN FEMALE: Primary | ICD-10-CM

## 2020-05-07 DIAGNOSIS — R10.2 PELVIC PAIN: ICD-10-CM

## 2020-05-07 NOTE — PROGRESS NOTES
Veacon Video visit    Tomeka Scales is a 28 y.o. female who was seen by synchronous (real-time) audio-video technology on 5/7/2020. Consent: Tomeka Scales, who was seen by synchronous (real-time) audio-video technology, and/or her healthcare decision maker, is aware that this patient-initiated, Telehealth encounter on 5/7/2020 is a billable service, with coverage as determined by her insurance carrier. She is aware that she may receive a bill and has provided verbal consent to proceed: Yes. Postpartum evaluation    Tomeka Scales is a 28 y.o. female who presents for a postpartum exam.     She is now 7 weeks post normal spontaneous vaginal delivery. Her baby boy Dia Ibarra is doing well. Her mother is still here helping with the children. She has had no menses since delivery. She has had the following significant problems since her delivery: none  Still having bad joint/pelvic pain, would like to see PT. Lochia stopped at about week 4. Moved into a new house. The patient is feeding the baby using pumped breast milk. Using the freeme pump. The patient would like to use condoms for birth control. Jayson Argueta may get a vasectomy in the future. She is currently taking: no medications. Mood is good, doing much better! She is due for her next AE in 3-12 months. Assessment:  Normal postpartum check    Plan:  Okay to return to normal activities. RTO for AE in 3-12 months. Rx for contraception: declines. Opts for condoms for now,  may get vasectomy. Pt advised to contact us if she desires rx. Pelvic PT referral given.       Objective:     General: alert, cooperative, no distress   Mental  status: mental status: alert, oriented to person, place, and time, normal mood, behavior, speech, dress, motor activity, and thought processes   Resp: resp: normal effort and no respiratory distress   Neuro: neuro: no gross deficits   Skin: skin: no discoloration or lesions of concern on visible areas   Due to this being a TeleHealth evaluation, many elements of the physical examination are unable to be assessed. We discussed the expected course, resolution and complications of the diagnosis(es) in detail. Medication risks, benefits, costs, interactions, and alternatives were discussed as indicated. I advised her to contact the office if her condition worsens, changes or fails to improve as anticipated. She expressed understanding with the diagnosis(es) and plan. Tree Ortiz is a 28 y.o. female who was evaluated by a video visit encounter for concerns as above. Patient identification was verified prior to start of the visit. A caregiver was present when appropriate. Due to this being a TeleHealth encounter (During UZKOZ-69 public health emergency), evaluation of the following organ systems was limited: Vitals/Constitutional/EENT/Resp/CV/GI//MS/Neuro/Skin/Heme-Lymph-Imm. Pursuant to the emergency declaration under the [facility], 1135 waiver authority and the [employer] and [employer] Act, this Virtual  Visit was conducted, with patient's (and/or legal guardian's) consent, to reduce the patient's risk of exposure to COVID-19 and provide necessary medical care. Services were provided through a video synchronous discussion virtually to substitute for in-person clinic visit. Patient and provider were located at their individual homes.       David James MD

## 2020-05-07 NOTE — PATIENT INSTRUCTIONS
Postpartum: Care Instructions Your Care Instructions After childbirth (postpartum period), your body goes through many changes. Some of these changes happen over several weeks. In the hours after delivery, your body will begin to recover from childbirth while it prepares to breastfeed your . You may feel emotional during this time. Your hormones can shift your mood without warning for no clear reason. In the first couple of weeks after childbirth, many women have emotions that change from happy to sad. You may find it hard to sleep. You may cry a lot. This is called the \"baby blues. \" These overwhelming emotions often go away within a couple of days or weeks. But it's important to discuss your feelings with your doctor. It is easy to get too tired and overwhelmed during the first weeks after childbirth. Don't try to do too much. Get rest whenever you can, accept help from others, and eat well and drink plenty of fluids. In the first couple of weeks after giving birth, your doctor or midwife may want to check in with you and make a plan for any follow-up care you may need. You will likely have a complete postpartum visit in the first 3 months after delivery. At that time, your doctor or midwife will check on your recovery from childbirth. He or she will also see how you are doing with your emotions and talk about your concerns or questions. Follow-up care is a key part of your treatment and safety. Be sure to make and go to all appointments, and call your doctor if you are having problems. It's also a good idea to know your test results and keep a list of the medicines you take. How can you care for yourself at home? · Sleep or rest when your baby sleeps. · Get help with household chores from family or friends, if you can. Do not try to do it all yourself.  
· If you have hemorrhoids or swelling or pain around the opening of your vagina, try using cold and heat. You can put ice or a cold pack on the area for 10 to 20 minutes at a time. Put a thin cloth between the ice and your skin. Also try sitting in a few inches of warm water (sitz bath) 3 times a day and after bowel movements. · Take pain medicines exactly as directed. ? If the doctor gave you a prescription medicine for pain, take it as prescribed. ? If you are not taking a prescription pain medicine, ask your doctor if you can take an over-the-counter medicine. · Eat more fiber to avoid constipation. Include foods such as whole-grain breads and cereals, raw vegetables, raw and dried fruits, and beans. · Drink plenty of fluids, enough so that your urine is light yellow or clear like water. If you have kidney, heart, or liver disease and have to limit fluids, talk with your doctor before you increase the amount of fluids you drink. · Do not rinse inside your vagina with fluids (douche). · If you have stitches, keep the area clean by pouring or spraying warm water over the area outside your vagina and anus after you use the toilet. · Keep a list of questions to ask your doctor or midwife. Your questions might be about: 
? Changes in your breasts, such as lumps or soreness. ? When to expect your menstrual period to start again. ? What form of birth control is best for you. ? Weight you have put on during the pregnancy. ? Exercise options. ? What foods and drinks are best for you, especially if you are breastfeeding. ? Problems you might be having with breastfeeding. ? When you can have sex. Some women may want to talk about lubricants for the vagina. ? Any feelings of sadness or restlessness that you are having. When should you call for help? Call 911 anytime you think you may need emergency care. For example, call if: 
  · You have thoughts of harming yourself, your baby, or another person.  
  · You passed out (lost consciousness).   · You have chest pain, are short of breath, or cough up blood.  
  · You have a seizure.  
 Call your doctor now or seek immediate medical care if: 
  · Your vaginal bleeding seems to be getting heavier.  
  · You are dizzy or lightheaded, or you feel like you may faint.  
  · You have a fever.  
  · You have new or more belly pain.  
  · You have symptoms of a blood clot in your leg (called a deep vein thrombosis), such as: 
? Pain in the calf, back of the knee, thigh, or groin. ? Redness and swelling in your leg or groin.  
  · You have signs of preeclampsia, such as: 
? Sudden swelling of your face, hands, or feet. ? New vision problems (such as dimness, blurring, or seeing spots). ? A severe headache.  
 Watch closely for changes in your health, and be sure to contact your doctor if: 
  · You have new or worse vaginal discharge.  
  · You feel sad or depressed.  
  · You are having problems with your breasts or breastfeeding. Where can you learn more? Go to http://valerie-mariana.info/ Enter K712 in the search box to learn more about \"Postpartum: Care Instructions. \" Current as of: May 29, 2019Content Version: 12.4 © 6587-0591 Healthwise, Incorporated. Care instructions adapted under license by [employer] (which disclaims liability or warranty for this information). If you have questions about a medical condition or this instruction, always ask your healthcare professional. [place] [address] any warranty or liability for your use of this information.

## 2020-05-08 ENCOUNTER — VIRTUAL VISIT (OUTPATIENT)
Dept: INTERNAL MEDICINE CLINIC | Facility: CLINIC | Age: 35
End: 2020-05-08

## 2020-05-08 VITALS — WEIGHT: 175 LBS | BODY MASS INDEX: 26.52 KG/M2 | HEIGHT: 68 IN

## 2020-05-08 DIAGNOSIS — R10.2 PELVIC PAIN: Primary | ICD-10-CM

## 2020-05-08 NOTE — PROGRESS NOTES
Sotero Kaur  Identified pt with two pt identifiers(name and ). Chief Complaint   Patient presents with    [facility] Referral Request     PT       Reviewed record In preparation for visit and have obtained necessary documentation. 1. Have you been to the ER, urgent care clinic or hospitalized since your last visit? No     2. Have you seen or consulted any other health care providers outside of the [address] [place] [address] since your last visit? Include any pap smears or colon screening. Yes. OB/GYN     Patient has an advance directive. Vitals reviewed with provider. Health Maintenance reviewed:     Health Maintenance Due   Topic    PAP AKA CERVICAL CYTOLOGY           Wt Readings from Last 3 Encounters:   20 175 lb (79.4 kg)   20 175 lb (79.4 kg)   20 200 lb (90.7 kg)        Temp Readings from Last 3 Encounters:   20 98 °F (36.7 °C)   19 98.8 °F (37.1 °C) (Oral)   19 97.5 °F (36.4 °C)        BP Readings from Last 3 Encounters:   20 117/77   20 122/82   20 102/70        Pulse Readings from Last 3 Encounters:   20 85   20 (!) 56   19 82        Vitals:    20 0955   Weight: 175 lb (79.4 kg)   Height: 5' 8\" (1.727 m)   PainSc:   0 - No pain          Learning Assessment:   :       Learning Assessment 2018   PRIMARY LEARNER Patient   PRIMARY LANGUAGE ENGLISH   LEARNER PREFERENCE PRIMARY READING     LISTENING   ANSWERED BY patient   RELATIONSHIP SELF        Depression Screening:   :       3 most recent PHQ Screens 2020   Little interest or pleasure in doing things Not at all   Feeling down, depressed, irritable, or hopeless Not at all   Total Score PHQ 2 0        Fall Risk Assessment:   :     No flowsheet data found. Abuse Screening:   :     No flowsheet data found.      ADL Screening:   :       ADL Assessment 2018   Feeding yourself No Help Needed   Getting from bed to chair No Help Needed   Getting dressed No Help Needed   Bathing or showering No Help Needed   Walk across the room (includes cane/walker) No Help Needed   Using the telphone No Help Needed   Taking your medications No Help Needed   Preparing meals No Help Needed   Managing money (expenses/bills) No Help Needed   Moderately strenuous housework (laundry) No Help Needed   Shopping for personal items (toiletries/medicines) No Help Needed   Shopping for groceries No Help Needed   Driving No Help Needed   Climbing a flight of stairs No Help Needed   Getting to places beyond walking distances No Help Needed

## 2020-05-08 NOTE — TELEPHONE ENCOUNTER
----- Message from Tasneem Cordero sent at 5/7/2020  5:56 PM EDT -----  Regarding: Referral Request  Contact: [phone]  Good evening, I was wondering if I would be able to get a referral placed for pelvic PT through [facility]. My OB/GYN sent then an rx today for post partum PT but I know my secondary insurance will require a referral. If I need to make a virtual visit for that then that's fine as well. You should be able to see the rx in the system from her.

## 2020-05-08 NOTE — PROGRESS NOTES
Aditya Strong is a 28 y.o. female who was seen by synchronous (real-time) audio-video technology on 5/8/2020. Consent: Aditya Strong, who was seen by synchronous (real-time) audio-video technology, and/or her healthcare decision maker, is aware that this patient-initiated, Telehealth encounter on 5/8/2020 is a billable service, with coverage as determined by her insurance carrier. She is aware that she may receive a bill and has provided verbal consent to proceed: Yes. Assessment & Plan:     Diagnoses and all orders for this visit:    1. Pelvic pain  Referral order for PT with [facility] has been approved and signed by me. Follow-up and Dispositions    · Return if symptoms worsen or fail to improve. 712  Subjective:   Aditya tSrong is a 28 y.o. female who was seen for Referral Request (PT)    Patient presents for Physical Therapy referral.  Needs PCP approval for referral.  She gave birth to a baby boy by vaginal delivery on 3/18/20. She complains of bilateral hip and pelvic pain that started during her pregnancy and has not resolved. Right hip area worse than left hip area. With her first pregnancy had only mild hip discomfort. Had 6 week postpartum visit with Dr. Rasta Dhillon yesterday; placed PT referral order. Prior to Admission medications    Medication Sig Start Date End Date Taking? Authorizing Provider   ibuprofen (MOTRIN) 800 mg tablet Take 1 Tab by mouth every eight (8) hours as needed for Pain. 3/19/20   Rahel Valenzuela MD     Allergies   Allergen Reactions    Penicillin G Hives     childhood       Patient Active Problem List   Diagnosis Code    10 weeks gestation of pregnancy Z3A.10    Vaginal delivery O80       ROS  A complete review of systems was performed and is negative except for those mentioned in the HPI.       Objective:     Visit Vitals  Ht 5' 8\" (1.727 m)   Wt 175 lb (79.4 kg) Comment: Patient Reported   LMP  (LMP Unknown)   BMI 26.61 kg/m²      General: alert, cooperative, no distress   Mental  status: normal mood, behavior, speech, dress, motor activity, and thought processes, able to follow commands   HENT: NCAT   Neck: no visualized mass   Resp: no respiratory distress   Neuro: no gross deficits   Skin: no discoloration or lesions of concern on visible areas   Psychiatric: normal affect, consistent with stated mood, no evidence of hallucinations     Additional exam findings: none      We discussed the expected course, resolution and complications of the diagnosis(es) in detail. Medication risks, benefits, costs, interactions, and alternatives were discussed as indicated. I advised her to contact the office if her condition worsens, changes or fails to improve as anticipated. She expressed understanding with the diagnosis(es) and plan. Chris Damon is a 28 y.o. female who was evaluated by a video visit encounter for concerns as above. Patient identification was verified prior to start of the visit. A caregiver was present when appropriate. Due to this being a TeleHealth encounter (During [facility] emergency), evaluation of the following organ systems was limited: Vitals/Constitutional/EENT/Resp/CV/GI//MS/Neuro/Skin/Heme-Lymph-Imm. Pursuant to the emergency declaration under the [facility], [facility]5 waiver authority and the [employer] Act, this Virtual  Visit was conducted, with patient's (and/or legal guardian's) consent, to reduce the patient's risk of exposure to COVID-19 and provide necessary medical care. THIS VISIT WAS COMPLETED VIRTUALLY USING [employer]. ME    Services were provided through a video synchronous discussion virtually to substitute for in-person clinic visit. Patient and provider were located at their individual homes.       Ernesto Duran MD

## 2020-05-22 ENCOUNTER — HOSPITAL ENCOUNTER (OUTPATIENT)
Dept: PHYSICAL THERAPY | Age: 35
Discharge: HOME | End: 2020-05-22
Payer: COMMERCIAL

## 2020-05-22 PROCEDURE — 97162 PT EVAL MOD COMPLEX 30 MIN: CPT | Performed by: PHYSICAL THERAPIST

## 2020-05-22 PROCEDURE — 97530 THERAPEUTIC ACTIVITIES: CPT | Performed by: PHYSICAL THERAPIST

## 2020-05-22 PROCEDURE — 97110 THERAPEUTIC EXERCISES: CPT | Performed by: PHYSICAL THERAPIST

## 2020-05-22 NOTE — PROGRESS NOTES
PT INITIAL EVALUATION NOTE 2-15    Patient Name: Tomeka Scales  Date:2020  : 1985  [x]  Patient  Verified  Payor: [employer] / Plan: [address] [place] [address] / Product Type: PPO /    In time: 0800  Out time: 0900  Total Treatment Time (min): 60  Visit #: 1     Treatment Area: Bilateral hip pain [M25.551, M25.552]    Edith Lucerojaime Jaimes was informed of the inherent limitations of a virtual visit,  and has consented to a virtual therapy visit on 2020. Information regarding emergency contact information for this patient during this visit is to contact: Sariah Jarquin  at  [phone] in addition to calling 911. The patient was informed that at any time during the virtual visit, they can decide to stop the virtual visit. The patient verified that they are physically located in the [facility] for this virtual visit. SUBJECTIVE  Pain Level (0-10 scale):  3  Any medication changes, allergies to medications, adverse drug reactions, diagnosis change, or new procedure performed?: [] No    [x] Yes (see summary sheet for update)  Subjective:     Patient is a 28year old female   post  3/18/20 with complaints of R anterior hip pain that started in her first pregnancy. Pain was persistent through her first pregnancy, resolved after delivery. She had a reoccurance of R hip pain in the second trimester of her recent pregnancy, she expected it to similarly resolve after delivery, she is now 10 weeks post partum with no reduction in R hip pain. R anterior/groin discomfort is nearly constant, made worse with sit to stand transitions, stair climbing, bed mobility and any attempts at running or dancing. She has a lot of stiffness after sitting long periods. She can have pain so severe she cannot move for a few minutes. She has less severe posterior R hip/buttock tightness. She denies parasthesias/numbness.   She has some R leg weakness but this feels related to pain inhibition rather than gerry weakness. She is nursing exclusively. She ran track in high school, has done yoga and gym workouts in the past, no R hip injury prior to first pregnancy. Patient works at a home [address] [facility], transitioning between sit and stand, went back to work 2 weeks ago. She has been trying to adjust her work station to reduce R hip pain. PLOF: R hip pain reoccurring. Mechanism of Injury:  none  Previous Treatment/Compliance: none  PMHx/Surgical Hx: 2 , non complicated   Work Hx: sedentary   Living Situation: , 2 children, 2 stories  Pt Goals: no R hip pain   Barriers: none  Motivation: good  Substance use: none  FABQ Score: FOTO  Cognition: A & O x 4        OBJECTIVE/EXAMINATION    Pt appears stiff/antalgic moving sit to stand, uses 2 hands to transition. Standing Posture  Increased lumbar lordosis, decreased abdominal tone, increased thoracic kyphosis, mild forward head position     Movement testing:    Pt unable to perform R SLR in supine without using hands to assist  Demonstrates decreased R hip flexion AROM to 90 degrees, limited by pain  Demonstrates poor tolerance to R hip ER with flexion AROM (unable to measure)     Pt able to perform full squat, unable to rise to standing due to R hip pain. 30 min Therapeutic Exercise:  [] See flow sheet :  Access Code: [email or web address]   URL: [email or web address]   Date: 2020   Prepared by: Jayce Stein     Exercises   Standing Hip Flexor Stretch - 4 reps - 30 seconds hold - 1x daily - 7x weekly   Standing Pelvic Tilt to Neutral Spine at Wall - 10 reps - 3 sets - 1x daily - 7x weekly   Standing Hamstring Curl with Resistance - 10 reps - 3 sets - 1x daily - 7x weekly   Patient Education   Hip Labral Tear   Femoroacetabular Impingement     Rationale: increase strength and improve coordination to improve the patients ability to move sit to stand with no pain     15 min Therapeutic Activity:  []  See flow sheet :  Strategies for R hip pain management, sleep positions to reduce pain, work station set up, sitting posture. Pt to use heat/ice 20 mins 2x a day (more if needed)    Rationale: improve coordination and increase proprioception  to improve the patients ability to reduce R hip pain with work activities. With   [x] TE   [x] TA   [] neuro   [] other: Patient Education: [x] Review HEP    [x] Progressed/Changed HEP based on:   [x] positioning   [x] body mechanics   [x] transfers   [x] heat/ice application    [] other:        Other Objective/Functional Measures:    Pt demonstrated good understanding of all concepts/exercises discussed and practiced today. Pain Level (0-10 scale) post treatment: 2      ASSESSMENT:   Pt with reoccurring R anterior hip pain in pregnancy and post partum presents with decreased R hip ROM, decreased R proximal LE strength, pain limiting AROM and function. She will benefit from skilled PT services to address her limitations and achieve her goals as stated on the plan of care. Today's visit conducted via telemedicine video conference, I will see her in the clinic for her next visit. She may need referral to ortho for suspected R labrum dysfunction, assessment is ongoing. [x]  See Plan of Care      Fazal Solares is a 28 y.o. female being evaluated by a Virtual Visit (video visit) encounter to address concerns as mentioned above. A caregiver was present when appropriate. Due to this being a TeleHealth encounter (During [place] [address] public health emergency), evaluation of the following areas was limited: Direct tissue palpation, direct goniometric measurements, blood pressure, O2 saturation.  Pursuant to the emergency declaration under the [address] [place] [address], [address] [place] [address] authority and the [employer] Act, this Virtual Visit was conducted with patient's (and/or legal guardian's) consent, to reduce the risk of exposure to COVID-19 and provide necessary medical care. Services were provided through a video synchronous discussion virtually to substitute for in-person encounter. --Audrey Frederick PT on 5/22/2020 at 8:06 AM    An electronic signature was used to authenticate this note.

## 2020-05-22 NOTE — PROGRESS NOTES
[address] [place] [address] Jared Mercado  Phone: [phone]  Fax: [phone]    Plan of Care/ Statement of Necessity for Physical Therapy Services 2-15    Patient name: Sami Macdonald  : 1985  Provider#: [id number]  Referral source: Tavia Torres MD      Medical/Treatment Diagnosis: Bilateral hip pain [M25.551, M25.552]     Prior Hospitalization: see medical history     Comorbidities: 10 weeks post   Prior Level of Function: reoccuring R hip pain since first pregnancy   Medications: Verified on Patient Summary List    Start of Care:  20      Onset Date: During pregnancy        The Plan of Care and following information is based on the information from the initial evaluation. Assessment/ key information:     Patient is a 28year old female   post  3/18/20 with complaints of R anterior hip pain that started in her first pregnancy. Pain was persistent through her first pregnancy, resolved after delivery. She had a reoccurance of R hip pain in the second trimester of her recent pregnancy, she expected it to similarly resolve after delivery, she is now 10 weeks post partum with no reduction in R hip pain. R anterior/groin discomfort is nearly constant, made worse with sit to stand transitions, stair climbing, bed mobility and any attempts at running or dancing. She has a lot of stiffness after sitting long periods. She can have pain so severe she cannot move for a few minutes. She has less severe posterior R hip/buttock tightness. She denies parasthesias/numbness. She has some R leg weakness but this feels related to pain inhibition rather than gerry weakness. She is nursing exclusively. Pt with reoccurring R anterior hip pain in pregnancy and post partum presents with decreased R hip ROM, decreased R proximal LE strength, pain limiting AROM and function.   She will benefit from skilled PT services to address her limitations and achieve her goals as stated on the plan of care. Today's visit conducted via telemedicine video conference, I will see her in the clinic for her next visit. She may need referral to ortho for suspected R labrum dysfunction, assessment is ongoing. Evaluation Complexity History MEDIUM  Complexity : 1-2 comorbidities / personal factors will impact the outcome/ POC ; Examination MEDIUM Complexity : 3 Standardized tests and measures addressing body structure, function, activity limitation and / or participation in recreation  ;Presentation MEDIUM Complexity : Evolving with changing characteristics  ; Clinical Decision Making MEDIUM Complexity : FOTO score of 26-74  Overall Complexity Rating: MEDIUM    Problem List: pain affecting function, decrease ROM, decrease strength, impaired gait/ balance, decrease ADL/ functional abilitiies, decrease activity tolerance, decrease flexibility/ joint mobility and decrease transfer abilities   Treatment Plan may include any combination of the following: Therapeutic exercise and Therapeutic activities  Patient / Family readiness to learn indicated by: asking questions and trying to perform skills  Persons(s) to be included in education: patient (P)  Barriers to Learning/Limitations: yes;  none  Patient Goal (s): no hip pain with household/childcare activities  Patient Self Reported Health Status: excellent  Rehabilitation Potential: excellent    Short Term Goals: To be accomplished in 6 weeks:  Pt will be independent with a progressive HEP  Pt will demonstrate good posture with sitting, standing, transitional ADLs  Pt will have no pain with palpation through hip/pelvis musculature. Pt will tolerate bed mobility/transitions and sit/stand transitions with no increased pain    Long Term Goals:  To be accomplished in 12 weeks:  Pt will demonstrate full functional ROM R hip   Pt will have no strength deficit through hip, lumbar, abdominal musculature   Pt will have no pain with standing/walking ad delvin. Pt will climb and descend stairs with no pain. Frequency / Duration: Patient to be seen  1  times per week for 6-12 weeks     Patient/ Caregiver education and instruction: self care, activity modification and exercises    [x]  Plan of care has been reviewed with PTA      Deysi Gomez is a 28 y.o. female being evaluated by a Virtual Visit (video visit) encounter to address concerns as mentioned above. A caregiver was present when appropriate. Due to this being a TeleHealth encounter (During [place] [address] public health emergency), evaluation of the following areas was limited: Direct tissue palpation, direct goniometric measurements, blood pressure, O2 saturation. Pursuant to the emergency declaration under the [address] [place] [address] authority and the [employer] Act, this Virtual Visit was conducted with patient's (and/or legal guardian's) consent, to reduce the risk of exposure to COVID-19 and provide necessary medical care. Services were provided through a video synchronous discussion virtually to substitute for in-person encounter. --Jesus Harris, PT on 5/22/2020 at 11:55 AM    An electronic signature was used to authenticate this note.    ________________________________________________________________________    I certify that the above Therapy Services are being furnished while the patient is under my care. I agree with the treatment plan and certify that this therapy is necessary.     [de-identified] Signature:____________________  Date:____________Time: _________

## 2020-06-01 ENCOUNTER — HOSPITAL ENCOUNTER (OUTPATIENT)
Dept: PHYSICAL THERAPY | Age: 35
Discharge: HOME | End: 2020-06-01
Payer: COMMERCIAL

## 2020-06-01 PROCEDURE — 97140 MANUAL THERAPY 1/> REGIONS: CPT | Performed by: PHYSICAL MEDICINE & REHABILITATION

## 2020-06-01 PROCEDURE — 97530 THERAPEUTIC ACTIVITIES: CPT | Performed by: PHYSICAL MEDICINE & REHABILITATION

## 2020-06-01 PROCEDURE — 97110 THERAPEUTIC EXERCISES: CPT | Performed by: PHYSICAL MEDICINE & REHABILITATION

## 2020-06-01 PROCEDURE — 97116 GAIT TRAINING THERAPY: CPT | Performed by: PHYSICAL MEDICINE & REHABILITATION

## 2020-06-01 NOTE — PROGRESS NOTES
[facility] DAILY TREATMENT NOTE 2-15    Patient Name: Alex Early  Date:2020  : 1985  [x]  Patient  Verified  Payor: GOLDIE VICK / Plan: [address] [place] [address] / Product Type: PPO /    In time: 1100  Out time: 1200  Total Treatment Time (min): 60  Visit #: 2  Treatment Area: Bilateral hip pain [M25.551, M25.552]    SUBJECTIVE  Pain Level (0-10 scale):  0  Any medication changes, allergies to medications, adverse drug reactions, diagnosis change, or new procedure performed?: [x] No    [] Yes (see summary sheet for update)  Subjective functional status/changes:   [] No changes reported  Still 4/10 level pain. She walked no more than 1 mile yesterday at a slow pace and had increased pain to 7/10, very stiff and sore this morning. Pain is located anterior R hip and deep. She has occasional pain on the L.      OBJECTIVE    Gait and Functional Mobility:  Pt stiff/antalgic with all transfers between sit and stand, gait is slow with decreased hip and knee excursion. Palpation: Non tender through R/L illiopsoas   Mild tenderness R piriformis with increased ms turgor noted. Swelling: None  Joint Mobility: Posterior L innominate noted in supine.     Diastasis Recti:   3/4/3      AROM, PROM:        R  L  Hip Flexion   100  125  Hip IR   35P!   50  Hip ER   30  40  Hip Extension  10  10           MMT:    R  L  Hip flexors  4/5  5/5  Hip adductors  4/5  5/5  Hip abductors  5/5  5/5  Hip extensors  5/5  5/5  Knee extensors 5/5  5/5  Knee flexors  4/5  5/5  Ankle DF   5/5  5/5  Ankle PF  5/5  5/5    Neurological: Sensation   LE DTR's brisk and equal, denies parasthesias   Special Tests:        Salazar:  Tight B hip flexors         Mariano: Tight B ITB   H.S. SLR:  65        Long Sit: NT         SAULO:  Unable to tolerate full SAULO position R, reproduced CC pain    FAIR:  Pain R anterior groin    Scour:  Pain R ER with abduction    Piriformis:  Mild tenderness R compared to L    Giancarlo's: Ant: Post:  Guarded, unable to accurately asses   Single Leg Stance:  Poor tolerance R. Trendelenberg: netative    Pt unable to high kneel R for hip flexor stretch. 30 min Therapeutic Exercise:  [] See flow sheet :      Access Code: [email or web address]   URL: [email or web address]   Date: 06/01/2020   Prepared by: Marcin Guerrero     Exercises   Seated Hip Adduction Isometrics with Sapna Steve - 10 reps - 3 sets - 1x daily - 7x weekly   Seated Hip Abduction with Resistance - 10 reps - 3 sets - 1x daily - 7x weekly   Supine Diastasis Recti Correction with Sheet - 10 reps - 3 sets - 1x daily - 7x weekly   Standing Hip Flexor Stretch - 4 reps - 30 seconds hold - 1x daily - 7x weekly        Rationale: increase strength and improve coordination to improve the patients ability to walk 1 mile with no pain     15 min Therapeutic Activity:  []  See flow sheet :   SEROLA belt fitting and wear/use instructions    Rationale: improve coordination and increase proprioception  to improve the patients ability to stand for ADLs with less pain        15 min Manual Therapy:  MET correct pub rami, L post innominate   Rationale: decrease pain and increase ROM  to improve the patients ability to transition with no pain             With   [x] TE   [] TA   [] neuro   [] other: Patient Education: [x] Review HEP    [x] Progressed/Changed HEP based on:   [] positioning   [] body mechanics   [] transfers   [] heat/ice application    [] other:      Other Objective/Functional Measures:  Pt noted decreased R hip pain following isometric hip adduction exs. Able to transition and walk with decreased pain with SEROLA belt in place      Pain Level (0-10 scale) post treatment: 2/10     ASSESSMENT/Changes in Function:     Patient will continue to benefit from skilled PT services to modify and progress therapeutic interventions to attain remaining goals.      [x]  See Plan of Care  []  See progress note/recertification  []  See Discharge Summary         Progress towards goals / Updated goals:  Able to advance exercises today with good tolerance. Pt continues to need instruction for correct exercise form and performance. Continues to demonstrate good potential to achieve functional goals stated on the plan of care.       PLAN  [x]  Upgrade activities as tolerated     []  Continue plan of care  []  Update interventions per flow sheet       []  Discharge due to:_  []  Other:_      Sarah Levy, PT, MSPT  6/1/2020

## 2020-06-08 ENCOUNTER — HOSPITAL ENCOUNTER (OUTPATIENT)
Dept: PHYSICAL THERAPY | Age: 35
Discharge: HOME | End: 2020-06-08
Payer: COMMERCIAL

## 2020-06-08 ENCOUNTER — PATIENT MESSAGE (OUTPATIENT)
Dept: INTERNAL MEDICINE CLINIC | Facility: CLINIC | Age: 35
End: 2020-06-08

## 2020-06-08 DIAGNOSIS — M25.552 BILATERAL HIP PAIN: Primary | ICD-10-CM

## 2020-06-08 DIAGNOSIS — M25.551 BILATERAL HIP PAIN: Primary | ICD-10-CM

## 2020-06-08 PROCEDURE — 97110 THERAPEUTIC EXERCISES: CPT | Performed by: PHYSICAL MEDICINE & REHABILITATION

## 2020-06-08 PROCEDURE — 97140 MANUAL THERAPY 1/> REGIONS: CPT | Performed by: PHYSICAL MEDICINE & REHABILITATION

## 2020-06-08 PROCEDURE — 97530 THERAPEUTIC ACTIVITIES: CPT | Performed by: PHYSICAL MEDICINE & REHABILITATION

## 2020-06-08 NOTE — TELEPHONE ENCOUNTER
----- Message from Magalilonny Nelida sent at 6/8/2020 12:03 PM EDT -----  Regarding: RE: Referral Request  Contact: [phone]  It should be for hip/joint pain.    ----- Message -----  From: Rajani Frederick LPN  Sent: 0/0/47 50:72  To: Magalilonny Nelida  Subject: RE: Referral Request    What is the diagnosis for your visit ?      ----- Message -----       From:Edith Hood Officer       Sent:6/8/2020 11:49 AM EDT         Adia Soto MD    Subject:Referral Request    My PT is requesting I schedule an appointmwnt with Dr. Alis Basilio at [facility] however my secondary insurance requires a referral be placed. Please advise when this can/will be completed so I may go ahead and request a visit. - - - DISCLAIMER - - -  [email or web address][This message may contain formatting that cannot be shown on this site.]

## 2020-06-08 NOTE — TELEPHONE ENCOUNTER
----- Message from Maryjane Sandsmge sent at 6/8/2020 12:19 PM EDT -----  Regarding: RE: Referral Request  Contact: [phone]  I believ his first name [facility]. It's mostly my right side but every now and then (like today) it's both sides. ----- Message -----  From: Rand Granados LPN  Sent: 5/2/76 73:52  To: Maryjane Haydee  Subject: RE: Referral Request    Thank you. Just 3 more questions: what is the doctors first name/ and which location? Is it both hips ?      ----- Message -----       From:Edith Ramos       Sent:6/8/2020 12:03 PM EDT         Esme Mckeon MD    Subject:RE: Referral Request    It should be for hip/joint pain.      ----- Message -----       From:Mallorie Marie LPN       TJEN:4/5/6794 11:56 AM EDT         To:Edith Ramos    Subject:RE: Referral Request    What is the diagnosis for your visit ?      ----- Message -----       From:Edith Ramos       Sent:6/8/2020 11:49 AM EDT         Esme Mckeon MD    Subject:Referral Request    My PT is requesting I schedule an appointmwnt with Dr. Laboy at [facility] however my secondary insurance requires a referral be placed. Please advise when this can/will be completed so I may go ahead and request a visit. - - - DISCLAIMER - - -  [email or web address][This message may contain formatting that cannot be shown on this site.]

## 2020-06-08 NOTE — TELEPHONE ENCOUNTER
----- Message from Sommer Hoff LPN sent at 3/5/2018  2:25 PM EDT -----  Regarding: FW: Referral Request  Contact: [phone]    ----- Message -----  From: Aditya Strong  Sent: 2020  12:19 PM EDT  To: Bsima Nurses  Subject: RE: Referral Request                             I believ his first name is Linda Kayla located at the [facility]. It's mostly my right side but every now and then (like today) it's both sides. ----- Message -----  From: Sommer Hoff LPN  Sent: 16 56:59  To: Aditya Strong  Subject: RE: Referral Request    Thank you. Just 3 more questions: what is the doctors first name/ and which location? Is it both hips ?      ----- Message -----       From:Edith Herrera       Sent:2020 12:03 PM EDT         Aric Murray MD    Subject:RE: Referral Request    It should be for hip/joint pain.      ----- Message -----       From:Mallorie Black LPN       [id number] 11:56 AM EDT         To:Edith Herrera    Subject:RE: Referral Request    What is the diagnosis for your visit ?      ----- Message -----       From:Edith Herrera       Sent:2020 11:49 AM EDT         Aric Murray MD    Subject:Referral Request    My PT is requesting I schedule an appointmwnt with Dr. Emanuel Jamison at [facility] however my secondary insurance requires a referral be placed. Please advise when this can/will be completed so I may go ahead and request a visit. - - - DISCLAIMER - - -  [email or web address][This message may contain formatting that cannot be shown on this site.]

## 2020-06-08 NOTE — PROGRESS NOTES
[facility] PT DAILY TREATMENT NOTE 2-15    Patient Name: Cecelia Hoffman  Date:2020  : 1985  [x]  Patient  Verified  Payor: BLUE CROSS / Plan: [address] [place] [address] / Product Type: PPO /    In time: 1000  Out time: 1100  Total Treatment Time (min): 60  Visit #: 3  Treatment Area: Bilateral hip pain [M25.551, M25.552]    SUBJECTIVE  Pain Level (0-10 scale):  0  Any medication changes, allergies to medications, adverse drug reactions, diagnosis change, or new procedure performed?: [x] No    [] Yes (see summary sheet for update)  Subjective functional status/changes:   [] No changes reported  Still 4/10 level pain. She walked no more than 1 mile yesterday at a slow pace and had increased pain to 7/10, very stiff and sore this morning. Pain is located anterior R hip and deep. She has occasional pain on the L.      OBJECTIVE    Gait and Functional Mobility:  Pt not antalgic appearing with transfers between sit and stand, gait is slow with decreased hip and knee excursion. Palpation: Non tender through R/L illiopsoas   Mild tenderness R piriformis with increased ms turgor noted. Joint Mobility: Posterior L innominate noted in supine. Diastasis Recti:   3/4/3      30 min Therapeutic Exercise:  [] See flow sheet :        Access Code: [email or web address]   URL: [email or web address]. com/   Date: 2020   Prepared by: Abelardo Hermosillo     Exercises - reviewed  Seated Hip Adduction Isometrics with Ball - 10 reps - 3 sets - 1x daily - 7x weekly   Seated Hip Abduction with Resistance - 10 reps - 3 sets - 1x daily - 7x weekly   Supine Diastasis Recti Correction with Sheet - 10 reps - 3 sets - 1x daily - 7x weekly   Standing Hip Flexor Stretch - 4 reps - 30 seconds hold - 1x daily - 7x weekly     Exercises - new  Seated Hip Adduction Isometrics with Ball - 10 reps - 3 sets - 1x daily - 7x weekly   Seated Long Arc Quad with Hip Adduction - 10 reps - 3 sets - 1x daily - 7x weekly   Standing Heel Raise with Support - 10 reps - 3 sets - 1x daily - 7x weekly   Standing with Back Flat Against Wall - 10 reps - 3 sets - 1x daily - 7x weekly   Supine Transversus Abdominis Bracing - Hands on Stomach - 10 reps - 3 sets - 1x daily - 7x weekly      Rationale: increase strength and improve coordination to improve the patients ability to walk 1 mile with no pain     15 min Therapeutic Activity:  []  See flow sheet :  Bed mobility/transfers   Rationale: improve coordination and increase proprioception  to improve the patients ability to stand for ADLs with less pain        15 min Manual Therapy:  MET correct pub rami, L post innominate   Rationale: decrease pain and increase ROM  to improve the patients ability to transition with no pain             With   [x] TE   [] TA   [] neuro   [] other: Patient Education: [x] Review HEP    [x] Progressed/Changed HEP based on:   [] positioning   [] body mechanics   [] transfers   [] heat/ice application    [] other:      Other Objective/Functional Measures:  Pt noted decreased R hip pain following isometric hip adduction exs. Able to transition and walk with decreased pain with SEROLA belt in place      Pain Level (0-10 scale) post treatment: 2/10     ASSESSMENT/Changes in Function:     Patient will continue to benefit from skilled PT services to modify and progress therapeutic interventions to attain remaining goals. [x]  See Plan of Care  []  See progress note/recertification  []  See Discharge Summary         Progress towards goals / Updated goals:  Able to advance exercises today with good tolerance. Pt continues to need instruction for correct exercise form and performance. Continues to demonstrate good potential to achieve functional goals stated on the plan of care.       PLAN  [x]  Upgrade activities as tolerated     []  Continue plan of care  []  Update interventions per flow sheet       []  Discharge due to:_  []  Other:_      Audrey Frederick, PT, MSPT  6/8/2020

## 2020-06-15 ENCOUNTER — APPOINTMENT (OUTPATIENT)
Dept: PHYSICAL THERAPY | Age: 35
End: 2020-06-15
Payer: COMMERCIAL

## 2020-06-22 ENCOUNTER — HOSPITAL ENCOUNTER (OUTPATIENT)
Dept: PHYSICAL THERAPY | Age: 35
Discharge: HOME | End: 2020-06-22
Payer: COMMERCIAL

## 2020-06-22 PROCEDURE — 97530 THERAPEUTIC ACTIVITIES: CPT | Performed by: PHYSICAL MEDICINE & REHABILITATION

## 2020-06-22 PROCEDURE — 97110 THERAPEUTIC EXERCISES: CPT | Performed by: PHYSICAL MEDICINE & REHABILITATION

## 2020-06-22 NOTE — PROGRESS NOTES
PT DAILY TREATMENT NOTE 2-15    Patient Name: Deysi Gomez  Date:2020  : 1985  [x]  Patient  Verified  Payor: [employer] / Plan: [address] [place] [address] / Product Type: PPO /    In time: 930  Out time: 1000  Total Treatment Time (min): 30  Visit #: 4  Treatment Area: Bilateral hip pain [M25.551, M25.552]    SUBJECTIVE  Pain Level (0-10 scale): 2  Any medication changes, allergies to medications, adverse drug reactions, diagnosis change, or new procedure performed?: [x] No    [] Yes (see summary sheet for update)  Subjective functional status/changes:   [] No changes reported  2/10 today   Up to 8/10 on Saturday, was unable to walk without a limp or position comfortably at all  She rode in a RV several hours that morning, feels this was contributory  She sees Dr Lia Martino for hip eval tomorrow. Not sure she is using SEROLA correctly   Frustrated, wants to exercise for wt loss, cant walk for exercise or return to dancing/aerobics due to pain. OBJECTIVE    45 min Therapeutic Exercise:  [] See flow sheet :      Reviewed and practiced HEP, added following:        Access Code: [email or web address]   URL: [email or web address]   Date: 2020   Prepared by: Italo Shepard     Exercises   Supine Transversus Abdominis Bracing - Hands on Stomach - 10 reps - 1 sets - 10sec hold - 1x daily - 7x weekly   Bent Knee Fallouts - 10 reps - 1 sets - 1x daily - 7x weekly   Hooklying Small March - 10 reps - 1 sets - 1x daily - 7x weekly   Supine 90/90 Abdominal Bracing - 10 reps - 3 sets - 1x daily - 7x weekly      Rationale: increase strength and improve coordination to improve the patients ability to     15 min Therapeutic Activity:  []  See flow sheet :  SEROLA belt use review. Posture/body mechanics including sitting for sx management. Rationale: improve coordination and increase proprioception  to improve the patients ability to care for her baby with no pain.              With   [x] TE   [x] TA   [] neuro [] other: Patient Education: [x] Review HEP    [] Progressed/Changed HEP based on:   [] positioning   [] body mechanics   [] transfers   [] heat/ice application    [x] other:  Advanced TrAB strengthening exs. Other Objective/Functional Measures: --      Pain Level (0-10 scale) post treatment: 2    ASSESSMENT/Changes in Function:    Able to progress core strengthening exs. Patient will continue to benefit from skilled PT services to modify and progress therapeutic interventions to attain remaining goals. [x]  See Plan of Care  []  See progress note/recertification  []  See Discharge Summary         Progress towards goals / Updated goals:  Able to advance exercises today with good tolerance. Pt continues to need instruction for correct exercise form and performance. Continues to demonstrate good potential to achieve functional goals stated on the plan of care.       PLAN  [x]  Upgrade activities as tolerated     []  Continue plan of care  []  Update interventions per flow sheet       []  Discharge due to:_  []  Other:_      Rob Liu, PT, MSPT 6/22/2020

## 2020-06-29 ENCOUNTER — APPOINTMENT (OUTPATIENT)
Dept: PHYSICAL THERAPY | Age: 35
End: 2020-06-29
Payer: COMMERCIAL

## 2020-07-06 ENCOUNTER — HOSPITAL ENCOUNTER (OUTPATIENT)
Dept: PHYSICAL THERAPY | Age: 35
Discharge: HOME | End: 2020-07-06
Payer: COMMERCIAL

## 2020-07-06 PROCEDURE — 97110 THERAPEUTIC EXERCISES: CPT | Performed by: PHYSICAL MEDICINE & REHABILITATION

## 2020-07-06 NOTE — PROGRESS NOTES
PT DAILY TREATMENT NOTE 2-15    Patient Name: Hortensia Butler  Date:2020  : 1985  [x]  Patient  Verified  Payor: BLUE CROSS / Plan: [address] [place] [address] / Product Type: PPO /    In time: 11:30   Out time: 12:15  Total Treatment Time (min):  45  Visit #: 5  Treatment Area: Bilateral hip pain [M25.551, M25.552]    SUBJECTIVE  Pain Level (0-10 scale): No pain now  Any medication changes, allergies to medications, adverse drug reactions, diagnosis change, or new procedure performed?: [x] No    [] Yes (see summary sheet for update)  Subjective functional status/changes:   [] No changes reported  Doing abdominal and core exs regularly with good tolerance   Saw Dr Eder Ayon, has been referred to have R diagnostic hip injection, she is working on getting this scheduled. Notes new onset R knee pain and swelling with insidious onset. OBJECTIVE      60 min Therapeutic Exercise:  [] See flow sheet :    Access Code: [email or web address]   URL: [employer]/   Date: 2020   Prepared by: Shane Muñoz     Previous exs reviewed and practiced. Added:     Exercises   Supine Bridge with Spinal Articulation - 10 reps - 3 sets - 1x daily - 7x weekly   Bridge on Heels - 10 reps - 3 sets - 1x daily - 7x weekly   Supine 90/90 Abdominal Bracing - 10 reps - 3 sets - 1x daily - 7x weekly   Supine 90/90 Alternating Toe Touch - 10 reps - 3 sets - 1x daily - 7x weekly   Supine Bridge with Resistance Band - 10 reps - 3 sets - 1x daily - 7x weekly   Supine Bridge with Mini Swiss Ball Between Knees - 10 reps - 3 sets - 1x daily - 7x weekly      Rationale: increase strength and improve coordination to improve the patients ability to walk and transfer with no hip pain.        With   [x] TE   [] TA   [] neuro   [] other: Patient Education: [x] Review HEP    [] Progressed/Changed HEP based on:   [x] positioning   [x] body mechanics   [] transfers   [] heat/ice application    [] other:      Other Objective/Functional Measures:   Continues to need manual and verbal cues for correct ex performance. Pain Level (0-10 scale) post treatment:  1    ASSESSMENT/Changes in Function:     Patient will continue to benefit from skilled PT services to modify and progress therapeutic interventions to attain remaining goals. [x]  See Plan of Care  []  See progress note/recertification  []  See Discharge Summary         Progress towards goals / Updated goals:  Able to advance exercises today with good tolerance. Pt continues to need instruction for correct exercise form and performance. Continues to demonstrate good potential to achieve functional goals stated on the plan of care.       PLAN  [x]  Upgrade activities as tolerated     []  Continue plan of care  []  Update interventions per flow sheet       []  Discharge due to:_  []  Other:_      Barney Alfred PT, MSPT 7/6/2020

## 2020-07-23 ENCOUNTER — VIRTUAL VISIT (OUTPATIENT)
Dept: INTERNAL MEDICINE CLINIC | Facility: CLINIC | Age: 35
End: 2020-07-23

## 2020-07-23 DIAGNOSIS — M25.551 RIGHT HIP PAIN: ICD-10-CM

## 2020-07-23 DIAGNOSIS — M25.562 ACUTE PAIN OF LEFT KNEE: Primary | ICD-10-CM

## 2020-07-23 RX ORDER — DICLOFENAC SODIUM 75 MG/1
75 TABLET, DELAYED RELEASE ORAL
COMMUNITY
Start: 2020-06-23 | End: 2020-07-23

## 2020-07-23 NOTE — PROGRESS NOTES
Nas Heath is a 28 y.o. female who was seen by synchronous (real-time) audio-video technology on 7/23/2020 for Knee Pain (left knee)        Assessment & Plan:     Diagnoses and all orders for this visit:    1. Acute pain of left knee  Suspect prepatellar bursitis. Improving pain and swelling. Recommended OTC analgesic pain gel and Aleve as needed. 2. Right hip pain  I recommended she not take diclofenac because it enters the breast milk and is not a recommended as first line NSAID with breastfeeding. Follow-up and Dispositions    · Return in about 6 months (around 1/23/2021), or if symptoms worsen or fail to improve, for Annual physical exam, fasting labs same day. 712  Subjective:     Patient complains of left knee pain and swelling for the past 3 weeks. Pain located at anterolateral patellar area, swelling diffuse, made it hard to bend her knee and to kneel on her knees, pops occasionally. Denies redness, warmth, or tenderness. Took Aleve for a few days. Pain and swelling have improved significantly. Pain is 2/10 today. Has no chronic medical problems. Gave birth to a baby boy by vaginal delivery on 3/18/20. Having right hip pain since then. Saw Dr. Ernesto Jo and Rick HULL (Orthopedics) on 6/23/20. Right hip X-ray normal with no DJD or acute changes. Prescribed diclofenac but never filled the prescription due to concerns about breast-feeding. Has steroid injection to right hip yesterday. Having PT. Was instructed to start Pilates by therapist.    On no medications. Trying to find a good multivitamin to take post-partum. Soc Hx  . Has 3 children. 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 Her job is mostly sedentary. Never smoker. Drinks occasional alcohol. Denies recreational drug use. Does not get regular exercise.     Patient Active Problem List   Diagnosis Code    Vaginal delivery O80       ROS  A complete review of systems was performed and is negative except for those mentioned in the HPI. Objective:     Patient-Reported Vitals 7/23/2020   Patient-Reported LMP late June      General: alert, cooperative, no distress   Mental  status: normal mood, behavior, speech, dress, motor activity, and thought processes, able to follow commands   HENT: NCAT   Neck: no visualized mass   Resp: no respiratory distress   Neuro: no gross deficits   Skin: no discoloration or lesions of concern on visible areas   Psychiatric: normal affect, consistent with stated mood, no evidence of hallucinations     Additional exam findings: none, had difficulty moving camera to show me her left knee      We discussed the expected course, resolution and complications of the diagnosis(es) in detail. Medication risks, benefits, costs, interactions, and alternatives were discussed as indicated. I advised her to contact the office if her condition worsens, changes or fails to improve as anticipated. She expressed understanding with the diagnosis(es) and plan. THIS VISIT WAS COMPLETED VIRTUALLY USING [employer]. [address] [place] [address], who was evaluated through a patient-initiated, synchronous (real-time) audio-video encounter, and/or her healthcare decision maker, is aware that it is a billable service, with coverage as determined by her insurance carrier. She provided verbal consent to proceed: Yes, and patient identification was verified. It was conducted pursuant to the emergency declaration under the [address] [place] [address], [address] [place] [address] authority and the [employer] General Act. A caregiver was present when appropriate. Ability to conduct physical exam was limited. I was at home. The patient was at home.       Alda Cuellar MD

## 2020-07-23 NOTE — PROGRESS NOTES
Mickey Potts  Identified pt with two pt identifiers(name and ). Chief Complaint   Patient presents with    Knee Pain     left knee   left knee has discomfort     1. Have you been to the ER, urgent care clinic since your last visit? Hospitalized since your last visit? NO    2. Have you seen or consulted any other health care providers outside of the [address] [place] [address] since your last visit? Include any pap smears or colon screening. Saw orthopedic Dr Rosmery Saavedra provider has been notified of reason for visit, vitals and flowsheets obtained on patients. Reviewed record In preparation for visit, huddled with provider and have obtained necessary documentation      Health Maintenance Due   Topic    PAP AKA CERVICAL CYTOLOGY        Wt Readings from Last 3 Encounters:   20 175 lb (79.4 kg)   20 175 lb (79.4 kg)   20 200 lb (90.7 kg)     Temp Readings from Last 3 Encounters:   20 98 °F (36.7 °C)   19 98.8 °F (37.1 °C) (Oral)   19 97.5 °F (36.4 °C)     BP Readings from Last 3 Encounters:   20 117/77   20 122/82   20 102/70     Pulse Readings from Last 3 Encounters:   20 85   20 (!) 56   19 82     There were no vitals filed for this visit. Learning Assessment:  :     Learning Assessment 2018   PRIMARY LEARNER Patient   PRIMARY LANGUAGE ENGLISH   LEARNER PREFERENCE PRIMARY READING     LISTENING   ANSWERED BY patient   RELATIONSHIP SELF       Depression Screening:  :     3 most recent PHQ Screens 2020   Little interest or pleasure in doing things Not at all   Feeling down, depressed, irritable, or hopeless Not at all   Total Score PHQ 2 0       Fall Risk Assessment:  :     No flowsheet data found. Abuse Screening:  :     No flowsheet data found.     ADL Screening:  :     ADL Assessment 2018   Feeding yourself No Help Needed   Getting from bed to chair No Help Needed   Getting dressed No Help Needed   Bathing or showering No Help Needed   Walk across the room (includes cane/walker) No Help Needed   Using the telphone No Help Needed   Taking your medications No Help Needed   Preparing meals No Help Needed   Managing money (expenses/bills) No Help Needed   Moderately strenuous housework (laundry) No Help Needed   Shopping for personal items (toiletries/medicines) No Help Needed   Shopping for groceries No Help Needed   Driving No Help Needed   Climbing a flight of stairs No Help Needed   Getting to places beyond walking distances No Help Needed                 Medication reconciliation up to date and corrected with patient at this time.

## 2020-11-16 NOTE — PROGRESS NOTES
Physical Therapy at [facility],   a part of  [facility]  [address] [facility] Jared Mercado  Phone: [phone]  Fax: [phone]    Discharge Summary  2-15    Patient name: Kirstie Serrano  : 1985  Provider#: [id number]  Referral source: Adolfo Malhotra MD      Medical/Treatment Diagnosis: Bilateral hip pain [M25.551, M25.552]     Prior Hospitalization: see medical history     Comorbidities: See Plan of Care  Prior Level of Function:See Plan of Care  Medications: Verified on Patient Summary List    Start of Care: 20      Onset Date: post partum   Visits from Start of Care: 5     Missed Visits:  3  Reporting Period : 20      ASSESSMENT/SUMMARY OF CARE: Mrs Kiley Martinez was referred to PT for evaluation and treatment of R hip pain postpartum. Treatment consisted of manual therapy, therapeutic exercise, therapeutic activity, patient education, posture and body mechanics training, modalities, home exercise program development and progression. She was initially seen via virtual visit due to [facility] emergency clinic closure, then was able to begin in-person visits. Mrs Kiley Martinez demonstrated good understanding of concepts and exercises discussed, she was independent with an initial home exercise program.  Upon physical exam here in the clinic a hip labrum deficit was suspected, she was referred to Dr Camron Cochran for further ortho work up. On last PT visit she reported she was scheduled for R hip arthrogram with Dr. Camron Cochran. Mrs Kiley Martinez was ultimately discharged from PT due to frequent no shows. I counseled her on this on several occasions prior to discharge. Current functional status is not known.        All STG's met   No LTG's met     RECOMMENDATIONS:  [x]Discontinue therapy: []Patient has reached or is progressing toward set goals      [x]Patient is non-compliant or has abdicated      []Due to lack of appreciable progress towards set goals      []Other    Marley Hankins, PT, MSPT    11/16/2020

## 2021-04-08 ENCOUNTER — ROUTINE PRENATAL (OUTPATIENT)
Dept: OBGYN CLINIC | Age: 36
End: 2021-04-08

## 2021-04-08 VITALS
SYSTOLIC BLOOD PRESSURE: 120 MMHG | BODY MASS INDEX: 25.91 KG/M2 | WEIGHT: 171 LBS | DIASTOLIC BLOOD PRESSURE: 80 MMHG | HEIGHT: 68 IN

## 2021-04-08 DIAGNOSIS — Z36.9 ANTENATAL SCREENING ENCOUNTER: ICD-10-CM

## 2021-04-08 DIAGNOSIS — Z34.90 PREGNANCY, UNSPECIFIED GESTATIONAL AGE: Primary | ICD-10-CM

## 2021-04-08 PROBLEM — Z3A.10 10 WEEKS GESTATION OF PREGNANCY: Status: RESOLVED | Noted: 2019-09-13 | Resolved: 2021-04-08

## 2021-04-08 PROCEDURE — 0502F SUBSEQUENT PRENATAL CARE: CPT | Performed by: OBSTETRICS & GYNECOLOGY

## 2021-04-08 NOTE — PATIENT INSTRUCTIONS
Weeks 6 to 10 of Your Pregnancy: Care Instructions  Overview     During the first 6 to 10 weeks of your pregnancy, your body goes through many changes. Your baby grows very quickly, even though you can't feel it yet. You may start to feel different, both in your body and your emotions. Because each pregnancy is unique, there's no right way to feel. You may feel the healthiest you've ever been, or you might feel tired or sick to your stomach (\"morning sickness\"). These early weeks are a time to make healthy choices and to eat the best foods for you and your baby. This is also a good time to think about birth defects testing. These are tests done during pregnancy to look for possible problems with the baby. First-trimester tests for birth defects can be done between 8 and 13 weeks of pregnancy, depending on the test. Talk with your doctor about what kinds of tests are available. Follow-up care is a key part of your treatment and safety. Be sure to make and go to all appointments, and call your doctor if you are having problems. It's also a good idea to know your test results and keep a list of the medicines you take. How can you care for yourself at home? Eat well  · Eat at least 3 meals and 2 healthy snacks every day. Eat fresh, whole foods, including:  ? 7 or more servings of bread, tortillas, cereal, rice, pasta, or oatmeal.  ? 3 or more servings of vegetables, especially leafy green vegetables. ? 2 or more servings of fruits. ? 3 or more servings of milk, yogurt, or cheese. ? 2 or more servings of meat, turkey, chicken, fish, eggs, or dried beans. · Drink plenty of fluids, especially water. Avoid sodas and other sweetened drinks. · Choose foods that have important vitamins for your baby, such as calcium, iron, and folate. ? Dairy products, tofu, canned fish with bones, almonds, broccoli, dark leafy greens, corn tortillas, and fortified orange juice are good sources of calcium. ?  Beef, poultry, liver, spinach, lentils, dried beans, fortified cereals, and dried fruits are rich in iron. ? Dark leafy greens, broccoli, asparagus, liver, fortified cereals, orange juice, peanuts, and almonds are good sources of folate. · Avoid foods that could harm your baby. ? Do not eat raw or undercooked meat, chicken, or fish (such as sushi or raw oysters). ? Do not eat raw eggs or foods that contain raw eggs, such as Caesar dressing. ? Do not eat soft cheeses and unpasteurized dairy foods, such as Brie, feta, or blue cheese. ? Do not eat fish that contains a lot of mercury, such as shark, swordfish, tilefish, or vinita mackerel. Do not eat more than 6 ounces of tuna each week. ? Do not eat raw sprouts, especially alfalfa sprouts. ? Cut down on caffeine, such as coffee, tea, and cola. Protect yourself and your baby  · Do not touch cally litter or cat feces. They can cause an infection that could harm your baby. · High body temperature can be harmful to your baby. So if you want to use a sauna or hot tub, be sure to talk to your doctor about how to use it safely. [place] with morning sickness  · Sip small amounts of water, juices, or shakes. Try drinking between meals, not with meals. · Eat 5 or 6 small meals a day. Try dry toast or crackers when you first get up, and eat breakfast a little later. · Avoid spicy, greasy, and fatty foods. · When you feel sick, open your windows or go for a short walk to get fresh air. · Try nausea wristbands. These help some women. · Tell your doctor if you think your prenatal vitamins make you sick. Where can you learn more? Go to http://www.gray.com/  Enter G112 in the search box to learn more about \"Weeks 6 to 10 of Your Pregnancy: Care Instructions. \"  Current as of: October 8, 2020               Content Version: 12.8  © 4673-8771 [employer].    Care instructions adapted under license by [employer] (which disclaims liability or warranty for this information). If you have questions about a medical condition or this instruction, always ask your healthcare professional. [place] [address] any warranty or liability for your use of this information.

## 2021-04-08 NOTE — PROGRESS NOTES
Current pregnancy history:    Eliel Negron is a  28 y.o. female DECLINED No LMP recorded (lmp unknown). Patient is pregnant. .    She presents for the evaluation of amenorrhea and a positive pregnancy test.    LMP history:  The date of her LMP is uncertain, she thinks it was sometime around 21. Ultrasound data:  She had an ultrasound performed today in the office which revealed a viable roque pregnancy with a gestational age of 8w3d giving an [facility] of 11/10/21. A SINGLE VIABLE 9W1D WITH [place] OF 11/10/2021 IUP IS SEEN WITH NORMAL CARDIAC RHYTHM. GESTATIONAL AGE BASED ON TODAYS ULTRASOUND. A NORMAL YOLK SAC IS SEEN. RIGHT OVARY NOT SEEN DUE TO BOWEL GAS. LEFT OVARY APPEARS WITHIN NORMAL LIMITS. NO FREE FLUID IS SEEN IN THE CDS. Pregnancy symptoms:  Since her LMP she has experienced nausea and food aversions. She denies dysuria, discharge, vaginal bleeding. Past pregnancy history:   -  x 2 at [facility] and [facility] - both Dr. Antonio MOODY'Matt just turned 1 in March! Anisa Esmereg    Her mom is here visiting now from [place]. She moved into their new house last April. _ _ _ _ _ _ _ _ _ _ _ _ _ _ _ _ _ _ _ _ _ _ _ _ _ _ _ _ _ _ _ _     Substance history: negative for alcohol, tobacco and street drugs. Positive for nothing. Exposure history: There is/are indoor cat/s in the home. The patient was instructed to not change the cat litter. She admits close contact with children on a regular basis. She has had chicken pox or the vaccine in the past.   Patient denies issues with domestic violence. Genetic Screening/Teratology Counseling: (Includes patient, baby's father, or anyone in either family with:)  3.  Patient's age >/= 28 at EDC?--yes  2.   Thalassemia ([place], [place], [address], or  background): MCV<80?--no.     3.  Neural tube defect (meningomyelocele, spina bifida, anencephaly)?--no.   4.  Congenital heart defect?--no.  5.  Down syndrome?--no.   6.  Channing-Sachs ([employer], [place])?--no.   7.  Canavan's Disease?--no.   8.  Familial Dysautonomia?--no.   9.  Sickle cell disease or trait ()? --no   The patient has not been tested for sickle trait  10. Hemophilia or other blood disorders?--no. 11.  Muscular dystrophy?--no. 12.  Cystic fibrosis?--no. 13.  Evangeline's Chorea?--no. 14.  Mental retardation/autism (if yes was person tested for Fragile X)?--no. 15.  Other inherited genetic or chromosomal disorder?--no. 12.  Maternal metabolic disorder (DM, PKU, etc)?--no. 17.  Patient or FOB with a child with a birth defect not listed above?--no.  17a. Patient or FOB with a birth defect themselves?--no. 18.  Recurrent pregnancy loss, or stillbirth?--no. 19.  Any medications since LMP other than prenatal vitamins (include vitamins, supplements, OTC meds, drugs, alcohol)?--no. 20.  Any other genetic/environmental exposure to discuss?--no. Infection History:  1. Lives with someone with TB or TB exposed?--no.   2.  Patient or partner has history of genital herpes?--no.  3.  Rash or viral illness since LMP?--no.    4.  History of STD (GC, CT, HPV, syphilis, HIV)? --yes  5. Other: OTHER? Past Medical History:   Diagnosis Date    Asthma     As a child    Chlamydia     15 years ago    Heart murmur     As a child    Vaginal delivery 3/18/2020     No past surgical history on file.   Social History     Occupational History    Not on file   Tobacco Use    Smoking status: Never Smoker    Smokeless tobacco: Never Used   Substance and Sexual Activity    Alcohol use: Not Currently     Comment: occ social    Drug use: No    Sexual activity: Yes     Partners: Male     Family History   Problem Relation Age of Onset    Other Mother         high cholesterol    Hypertension Father     Diabetes Father     Other Father         CHF    Cancer Other         lung     OB History    Para Term  AB Living   3 2 2     2   SAB TAB Ectopic Molar Multiple Live Births           0 2      # Outcome Date GA Lbr Santiago/2nd Weight Sex Delivery Anes PTL Lv   3 Current            2 Term 03/18/20 39w6d 02:40 / 00:32 7 lb 10.9 oz (3.485 kg) M Vag-Spont None N JERRELL   1 Term 08/13/17 39w0d  6 lb 13.4 oz (3.1 kg) M Vag-Spont EPIDURAL AN N JERRELL     Allergies   Allergen Reactions    Penicillin G Hives     childhood     Prior to Admission medications    Not on File        Review of Systems: History obtained from the patient  Constitutional: negative for weight loss, fever, night sweats  HEENT: negative for hearing loss, earache, congestion, snoring, sore throat  CV: negative for chest pain, palpitations, edema  Resp: negative for cough, shortness of breath, wheezing  Breast: negative for breast lumps, nipple discharge, galactorrhea  GI: negative for change in bowel habits, abdominal pain, black or bloody stools  : negative for frequency, dysuria, hematuria, vaginal discharge  MSK: negative for back pain, joint pain, muscle pain  Skin: negative for itching, rash, hives  Neuro: negative for dizziness, headache, confusion, weakness  Psych: negative for anxiety, depression, change in mood  Heme/lymph: negative for bleeding, bruising, pallor    Objective:  Visit Vitals  /80 (BP 1 Location: Left arm, BP Patient Position: Sitting)   Ht 5' 8\" (1.727 m)   Wt 171 lb (77.6 kg)   LMP  (LMP Unknown)   BMI 26.00 kg/m²       Physical Exam:     Constitutional  · Appearance: well-nourished, well developed, alert, in no acute distress    HENT  · Head  · Face: appears normal  · Eyes: appear normal  · Ears: normal  · Mouth: normal  · Lips: no lesions      Chest  · Respiratory Effort: breathing unlabored     Cardiovascular  · Heart:  · Auscultation: regular rate and rhythm without murmur      Gastrointestinal  · Abdominal Examination: abdomen non-tender to palpation, normal bowel sounds, no masses present  · Liver and spleen: no hepatomegaly present, spleen not palpable  · Hernias: no hernias identified      Skin  · General Inspection: no rash, no lesions identified    Neurologic/Psychiatric  · Mental Status:  · Orientation: grossly oriented to person, place and time  · Mood and Affect: mood normal, affect appropriate      Assessment and Plan:     Intrauterine pregnancy with issues addressed in problem list.  We discussed genetic testing screening options for the pregnancy and offered NIPT and the patient is considering NIPT. We discussed carrier screening as per ACOG guidelines for CF, SMA, DMD, and Fragile X syndrome and the patient declines carrier screening. Course of pregnancy discussed including visit schedule, routine U/S, glucola testing, etc.  Avoid alcoholic beverages and illicit/recreational drugs use. Take prenatal vitamins or folic acid daily. Hospital and practice style discussed with coverage system. Discussed nutrition, toxoplasmosis precautions, sexual activity, exercise, need for influenza vaccine, environmental and work hazards, travel advice, screen for domestic violence, need for seat belts. Discussed seafood, unpasteurized dairy products, deli meat, artificial sweeteners, and caffeine. Discussed current prescription drug use. Given medication list.  Discussed the use of over the counter medications and chemicals. Route of delivery discussed, including risks, benefits  Handouts given to pt. RTO 3-4 weeks.     Devan Stock MD

## 2021-05-06 ENCOUNTER — ROUTINE PRENATAL (OUTPATIENT)
Dept: OBGYN CLINIC | Age: 36
End: 2021-05-06
Payer: COMMERCIAL

## 2021-05-06 VITALS — DIASTOLIC BLOOD PRESSURE: 60 MMHG | WEIGHT: 172 LBS | BODY MASS INDEX: 26.15 KG/M2 | SYSTOLIC BLOOD PRESSURE: 122 MMHG

## 2021-05-06 DIAGNOSIS — Z34.90 PREGNANCY, UNSPECIFIED GESTATIONAL AGE: Primary | ICD-10-CM

## 2021-05-06 DIAGNOSIS — Z36.9 ANTENATAL SCREENING ENCOUNTER: ICD-10-CM

## 2021-05-06 PROCEDURE — 0502F SUBSEQUENT PRENATAL CARE: CPT | Performed by: OBSTETRICS & GYNECOLOGY

## 2021-05-06 NOTE — PATIENT INSTRUCTIONS
Weeks 10 to 14 of Your Pregnancy: Care Instructions  Overview     By weeks 10 to 14 of your pregnancy, the placenta has formed inside your uterus. The placenta's main job is to give your baby oxygen and nutrients through the umbilical cord. It's possible to hear your baby's heartbeat with a special ultrasound device. Your baby's organs are developing. The arms and legs can bend. This is a good time to think about testing for birth defects. There are two types of tests: screening and diagnostic. Screening tests show the chance that a baby has a certain birth defect. They can't tell you for sure that your baby has a problem. Diagnostic tests show if a baby has a certain birth defect. It's your choice whether to have these tests. You and your partner can talk to your doctor or midwife about tests for birth defects. Follow-up care is a key part of your treatment and safety. Be sure to make and go to all appointments, and call your doctor if you are having problems. It's also a good idea to know your test results and keep a list of the medicines you take. How can you care for yourself at home? Decide about tests  · You can have screening tests and diagnostic tests to check for birth defects. The decision to have a test for birth defects is personal. Think about your age, your chance of passing on a family disease, your need to know about any problems, and what you might do after you have the test results. ? Quadruple (quad) blood test. This screening test can be done between 15 and 22 weeks of pregnancy. It checks the amount of four substances in your blood. The doctor looks at these test results, along with your age and other factors, to find out the chance that your baby may have certain problems. ? Amniocentesis. This diagnostic test is used to look for chromosomal problems in the baby's cells.  It can be done between 15 and 20 weeks of pregnancy, usually around week 16.  ? Nuchal translucency test. This test uses ultrasound to measure the thickness of the area at the back of the baby's neck. An increase in the thickness can be an early sign of Down syndrome. ? Chorionic villus sampling (CVS). This is a test that looks for certain genetic problems with your baby. The same genes that are in your baby are in the placenta. A small piece of the placenta is taken out and tested. This test is done when you are 10 to 13 weeks pregnant. Ease discomfort  · Slow down and take naps when you feel tired. · If your emotions swing, talk to someone. · If your gums bleed, try a softer toothbrush. If your gums are puffy and bleed a lot, see your dentist.  · If you feel dizzy:  ? Get up slowly after sitting or lying down. ? Drink plenty of fluids. ? Eat small snacks to keep your blood sugar stable. ? Put your head between your legs as though you were tying your shoelaces. ? Lie down with your legs higher than your head. Use pillows to prop up your feet. · If you have a headache:  ? Lie down. ? Ask your partner or a good friend for a neck massage. ? Try cool cloths over your forehead or across the back of your neck. ? Use acetaminophen (Tylenol) for pain relief. Do not use nonsteroidal anti-inflammatory drugs (NSAIDs), such as ibuprofen (Advil, Motrin) or naproxen (Aleve), unless your doctor says it is okay. · If you have a nosebleed, pinch your nose gently, and hold it for a short while. To prevent nosebleeds, try massaging a small dab of petroleum jelly, such as Vaseline, in your nostrils. · If your nose is stuffed up, try saline (saltwater) nose sprays. Do not use decongestant sprays. Care for your breasts  · Wear a bra that gives you good support. · Know that changes in your breasts are normal.  ? Your breasts may get larger and more tender. Tenderness usually gets better by 12 weeks. ? Your nipples may get darker and larger, and small bumps around your nipples may show more. ?  The veins in your chest and breasts may show more. Where can you learn more? Go to http://www.gray.com/  Enter A195 in the search box to learn more about \"Weeks 10 to 14 of Your Pregnancy: Care Instructions. \"  Current as of: October 8, 2020               Content Version: 12.8  © 3665-8541 [employer]. Care instructions adapted under license by [email or web address] (which disclaims liability or warranty for this information). If you have questions about a medical condition or this instruction, always ask your healthcare professional. [address] any warranty or liability for your use of this information.

## 2021-05-06 NOTE — PROGRESS NOTES
Pt doing well  Some nausea, well controlled with taking 1 unisom at night. no other meds needed at this time. More abdominal gas. IOB labs today inc urine. Some possible early flutters! Declined genetics testing.

## 2021-05-07 LAB
ABO + RH BLD: NORMAL
BLOOD BANK CMNT PATIENT-IMP: NORMAL
BLOOD GROUP ANTIBODIES SERPL: NORMAL
SPECIMEN EXP DATE BLD: NORMAL

## 2021-05-08 LAB
ERYTHROCYTE [DISTWIDTH] IN BLOOD BY AUTOMATED COUNT: 14.7 % (ref 11.5–14.5)
HBV SURFACE AG SER QL: <0.1 INDEX
HBV SURFACE AG SER QL: NEGATIVE
HCT VFR BLD AUTO: 37 % (ref 35–47)
HGB BLD-MCNC: 11.8 G/DL (ref 11.5–16)
HIV12 RESULT COMMENT, HHIVC: NORMAL
MCH RBC QN AUTO: 30.7 PG (ref 26–34)
MCHC RBC AUTO-ENTMCNC: 31.9 G/DL (ref 30–36.5)
MCV RBC AUTO: 96.4 FL (ref 80–99)
NRBC # BLD: 0 K/UL (ref 0–0.01)
NRBC BLD-RTO: 0 PER 100 WBC
PLATELET # BLD AUTO: 288 K/UL (ref 150–400)
PMV BLD AUTO: 11.3 FL (ref 8.9–12.9)
RBC # BLD AUTO: 3.84 M/UL (ref 3.8–5.2)
RUBV IGG SER-IMP: REACTIVE
RUBV IGG SERPL IA-ACNC: 197.3 IU/ML
VZV IGG SER IA-ACNC: 484 INDEX
WBC # BLD AUTO: 9.5 K/UL (ref 3.6–11)

## 2021-05-09 LAB
BACTERIA SPEC CULT: ABNORMAL
C TRACH RRNA SPEC QL NAA+PROBE: NEGATIVE
CC UR VC: ABNORMAL
N GONORRHOEA RRNA SPEC QL NAA+PROBE: NEGATIVE
SERVICE CMNT-IMP: ABNORMAL
SPECIMEN STATUS REPORT, ROLRST: NORMAL

## 2021-05-10 LAB
DEPRECATED HGB OTHER BLD-IMP: NORMAL %
HGB A MFR BLD: 97.3 % (ref 96.4–98.8)
HGB A2 MFR BLD COLUMN CHROM: 2.7 % (ref 1.8–3.2)
HGB C MFR BLD: NORMAL %
HGB F MFR BLD: 0 % (ref 0–2)
HGB FRACT BLD-IMP: NORMAL
HGB S BLD QL SOLY: NORMAL
HGB S MFR BLD: 0 %
T PALLIDUM AB SER QL IA: NON REACTIVE

## 2021-05-10 RX ORDER — NITROFURANTOIN 25; 75 MG/1; MG/1
100 CAPSULE ORAL 2 TIMES DAILY
Qty: 14 CAP | Refills: 0 | Status: SHIPPED | OUTPATIENT
Start: 2021-05-10 | End: 2021-05-17

## 2021-06-04 ENCOUNTER — ROUTINE PRENATAL (OUTPATIENT)
Dept: OBGYN CLINIC | Age: 36
End: 2021-06-04
Payer: COMMERCIAL

## 2021-06-04 VITALS — BODY MASS INDEX: 26.61 KG/M2 | WEIGHT: 175 LBS | DIASTOLIC BLOOD PRESSURE: 74 MMHG | SYSTOLIC BLOOD PRESSURE: 116 MMHG

## 2021-06-04 DIAGNOSIS — Z36.9 ANTENATAL SCREENING ENCOUNTER: ICD-10-CM

## 2021-06-04 DIAGNOSIS — Z34.90 PREGNANCY, UNSPECIFIED GESTATIONAL AGE: Primary | ICD-10-CM

## 2021-06-04 PROCEDURE — 0502F SUBSEQUENT PRENATAL CARE: CPT | Performed by: OBSTETRICS & GYNECOLOGY

## 2021-06-04 NOTE — PATIENT INSTRUCTIONS

## 2021-06-04 NOTE — PROGRESS NOTES
Doing well today  Nausea improved, feeling much better  Started PNV  Urine ALEXANDRA today  Declines tetra. Has fetal scan with MFM set up.

## 2021-06-07 RX ORDER — NITROFURANTOIN 25; 75 MG/1; MG/1
100 CAPSULE ORAL 2 TIMES DAILY
Qty: 14 CAPSULE | Refills: 0 | Status: SHIPPED | OUTPATIENT
Start: 2021-06-07 | End: 2021-06-14

## 2021-06-07 NOTE — PROGRESS NOTES
Phone call to patient, discussed urine culture results and need for repeat abx. Patient confirmed pharmacy, Rx sent per MD instructions.

## 2021-06-07 NOTE — PROGRESS NOTES
Please call pt and let her know that her urine culture returned with enterococcus, and send in rx for macrobid 100mg PO bid x 7 days (it would not let me send a [employer] message for some reason).

## 2021-06-08 LAB
BACTERIA SPEC CULT: ABNORMAL
BACTERIA SPEC CULT: ABNORMAL
CC UR VC: ABNORMAL
SERVICE CMNT-IMP: ABNORMAL

## 2021-07-01 ENCOUNTER — ROUTINE PRENATAL (OUTPATIENT)
Dept: OBGYN CLINIC | Age: 36
End: 2021-07-01
Payer: COMMERCIAL

## 2021-07-01 ENCOUNTER — HOSPITAL ENCOUNTER (OUTPATIENT)
Dept: PERINATAL CARE | Age: 36
Discharge: HOME | End: 2021-07-01
Attending: OBSTETRICS & GYNECOLOGY
Payer: COMMERCIAL

## 2021-07-01 VITALS — WEIGHT: 179 LBS | DIASTOLIC BLOOD PRESSURE: 78 MMHG | BODY MASS INDEX: 27.22 KG/M2 | SYSTOLIC BLOOD PRESSURE: 116 MMHG

## 2021-07-01 DIAGNOSIS — Z34.90 PREGNANCY, UNSPECIFIED GESTATIONAL AGE: Primary | ICD-10-CM

## 2021-07-01 DIAGNOSIS — Z36.9 ANTENATAL SCREENING ENCOUNTER: ICD-10-CM

## 2021-07-01 PROCEDURE — 0502F SUBSEQUENT PRENATAL CARE: CPT | Performed by: OBSTETRICS & GYNECOLOGY

## 2021-07-01 PROCEDURE — 76811 OB US DETAILED SNGL FETUS: CPT | Performed by: OBSTETRICS & GYNECOLOGY

## 2021-07-01 NOTE — PROGRESS NOTES
Doing well  Feeling some kicks now! FS today @ [facility] - found out she's having a GIRL! Concerned about tolerating glucola - she has had trouble recently with sugar and sugary drinks making her vomit. Repeat urine culture today for second ALEXANDRA.

## 2021-07-01 NOTE — PATIENT INSTRUCTIONS
Weeks 18 to 22 of Your Pregnancy: Care Instructions  Overview     Your baby is continuing to develop quickly. Sometime between 18 and 22 weeks, you'll probably start to feel your baby move. At first, these small fetal movements feel like fluttering or \"butterflies. \" Some women say that they feel like gas bubbles. As your baby grows, these movements will become stronger. You may also notice that your baby hiccups. Babies at this stage can now suck their thumbs. You may find that your nausea and fatigue are gone. You may feel better overall and have more energy than you did in your first trimester. But you might now also have some new discomforts, like sleep problems or leg cramps. Talk to your doctor about things you can do at home to ease these problems. Follow-up care is a key part of your treatment and safety. Be sure to make and go to all appointments, and call your doctor if you are having problems. It's also a good idea to know your test results and keep a list of the medicines you take. How can you care for yourself at home? Ease sleep problems  · Avoid caffeine in drinks or chocolate late in the day. · Get some exercise every day. · Take a warm shower or bath before bed. · Have a light snack or glass of milk at bedtime. · Do relaxation exercises in bed to calm your mind and body. · Support your legs and back with extra pillows. Try a pillow between your legs if you sleep on your side. · Do not use sleeping pills or alcohol. They could harm your baby. Ease leg cramps  · Do not massage your calf during the cramp. · Sit on a firm bed or chair. Straighten your leg, and bend your foot (flex your ankle) slowly upward, toward your knee. Bend your toes up and down. · Stand on a cool, flat surface. Stretch your toes upward, and take small steps walking on your heels. · Use a heating pad or hot water bottle to help with muscle ache. Prevent leg cramps  · Be sure to get enough calcium.  If you are worried that you are not getting enough, talk to your doctor. · Exercise every day, and stretch your legs before bed. · Take a warm bath before bed, and try leg warmers at night. Where can you learn more? Go to http://www.gray.com/  Enter A387 in the search box to learn more about \"Weeks 18 to 22 of Your Pregnancy: Care Instructions. \"  Current as of: October 8, 2020               Content Version: 12.8  © 2006-2021 [employer]. Care instructions adapted under license by [employer] (which disclaims liability or warranty for this information). If you have questions about a medical condition or this instruction, always ask your healthcare professional. [address] any warranty or liability for your use of this information.

## 2021-07-03 LAB
BACTERIA SPEC CULT: NORMAL
SERVICE CMNT-IMP: NORMAL

## 2021-07-16 ENCOUNTER — OFFICE VISIT (OUTPATIENT)
Dept: INTERNAL MEDICINE CLINIC | Age: 36
End: 2021-07-16
Payer: COMMERCIAL

## 2021-07-16 VITALS
BODY MASS INDEX: 27.58 KG/M2 | DIASTOLIC BLOOD PRESSURE: 65 MMHG | RESPIRATION RATE: 16 BRPM | OXYGEN SATURATION: 98 % | TEMPERATURE: 98.5 F | SYSTOLIC BLOOD PRESSURE: 99 MMHG | HEART RATE: 83 BPM | HEIGHT: 68 IN | WEIGHT: 182 LBS

## 2021-07-16 DIAGNOSIS — M67.432 GANGLION CYST OF DORSUM OF LEFT WRIST: Primary | ICD-10-CM

## 2021-07-16 PROCEDURE — 99213 OFFICE O/P EST LOW 20 MIN: CPT | Performed by: INTERNAL MEDICINE

## 2021-07-16 NOTE — PROGRESS NOTES
CC:   Chief Complaint   Patient presents with    Cyst     ganglion left        HISTORY OF PRESENT ILLNESS  Pineda Maciel is a 39 y.o. female. Patient is 23 weeks pregnant. Complains of painful cyst on her left wrist.  Accidentally hit her wrist against her 's watch a week ago and has been painful and swollen since then. Achy, pain up to 5/10, worse with any wrist movements. Pain improved when she wears a wrist brace. Saw Dr. Kimani Correia ([facility]) in 2019 about same ganglion cyst.  Surgery was planned but she became pregnant. Had drainage of ganglion cyst instead. Social Hx  . On her 6th pregnancy, one was miscarriage. Has 2 children with  and 3 other living children, 1 of whom lives with them. Expecting her first girl. Health Maintenance  Pap smear: 2019, Dr. Lyla Galvan  COVID-19 vaccine: declined      Patient Active Problem List   Diagnosis Code    Pregnant Z34.90     Past Medical History:   Diagnosis Date    Asthma     As a child    Chlamydia     15 years ago    Heart murmur     As a child    Vaginal delivery 3/18/2020     Allergies   Allergen Reactions    Penicillin G Hives     childhood       Current Outpatient Medications   Medication Sig Dispense Refill    prenatal multivit-ca-min-fe-fa (Prenatal Vitamin) tab Take  by mouth. PHYSICAL EXAM  Visit Vitals  BP 99/65 (BP 1 Location: Left upper arm, BP Patient Position: Sitting, BP Cuff Size: Large adult)   Pulse 83   Temp 98.5 °F (36.9 °C) (Oral)   Resp 16   Ht 5' 8\" (1.727 m)   Wt 182 lb (82.6 kg)   LMP  (LMP Unknown)   SpO2 98%   BMI 27.67 kg/m²       General: Well-developed and well-nourished, no distress. Pregnant. HEENT:  Head normocephalic/atraumatic, no scleral icterus  Lungs:  Clear to ausculation bilaterally. Good air movement. Heart:  Regular rate and rhythm, normal S1 and S2, no murmur, gallop, or rub  Extremities: No clubbing, cyanosis.  Tender grape-sized subcutaneous cyst at dorsum of left hand.  Neurological: Alert and oriented. Psychiatric: Normal mood and affect. Behavior is normal.         ASSESSMENT AND PLAN    ICD-10-CM ICD-9-CM    1. Ganglion cyst of dorsum of left wrist  M67.432 727.41 REFERRAL TO ORTHOPEDICS     Diagnoses and all orders for this visit:    1. Ganglion cyst of dorsum of left wrist  Recommended ice pack for 10 mins up to 4 times a day and wearing wrist brace as much as possible. Will refer to Dr. Jacques Bautista for possible ganglion cyst drainage.  -     REFERRAL TO ORTHOPEDICS      Follow-up and Dispositions    · Return if symptoms worsen or fail to improve. I have discussed the diagnosis with the patient and the intended plan as seen in the above orders. Patient is in agreement. The patient has received an after-visit summary and questions were answered concerning future plans. I have discussed medication side effects and warnings with the patient as well.

## 2021-07-16 NOTE — PATIENT INSTRUCTIONS
Ganglions: Care Instructions  Your Care Instructions     A ganglion is a small sac, or cyst, filled with a clear fluid that is like jelly. A ganglion may look like a bump on the hand or wrist. It also can appear on your feet, ankles, knees, or shoulders. It is not cancer. A ganglion can grow out of the protective area, or capsule, around a joint. It also can grow on a tendon sheath, which covers the ropelike tendons that connect muscle to bone. A ganglion may hurt or cause numbness if it presses on a nerve. Many ganglions do not need treatment, and they often go away on their own. But if a ganglion hurts, becomes larger, causes numbness, or limits your activity, your doctor may want to drain it with a needle and syringe or remove it with minor surgery. Follow-up care is a key part of your treatment and safety. Be sure to make and go to all appointments, and call your doctor if you are having problems. It's also a good idea to know your test results and keep a list of the medicines you take. How can you care for yourself at home? · Wear a wrist or finger splint as directed by your doctor. It will keep your wrist or hand from moving and help reduce the fluid in the cyst. This may be all you need for the ganglion to shrink and go away. · Do not smash a ganglion with a book or other heavy object. You may break a bone or otherwise injure your wrist by trying this folk remedy, and the ganglion may return anyway. · Do not try to drain the fluid by poking the ganglion with a pin or any other sharp object. You could cause an infection. When should you call for help? Call your doctor now or seek immediate medical care if:    · You have signs of infection, such as:  ? Increased pain, swelling, warmth, or redness. ? Red streaks leading from the cyst.  ? Pus draining from the cyst.  ? A fever.    Watch closely for changes in your health, and be sure to contact your doctor if:    · You have increasing pain.     · Your ganglion is getting larger.     · You still have pain or numbness from a ganglion. Where can you learn more? Go to http://www.gray.com/  Enter [id number] in the search box to learn more about \"Ganglions: Care Instructions. \"  Current as of: November 16, 2020               Content Version: 12.8  © 5947-4297 [employer]. Care instructions adapted under license by [employer] (which disclaims liability or warranty for this information). If you have questions about a medical condition or this instruction, always ask your healthcare professional. [place] [address] any warranty or liability for your use of this information.

## 2021-07-16 NOTE — PROGRESS NOTES
Identified pt with two pt identifiers. Reviewed record in preparation for visit and have obtained necessary documentation. All patient medications has been reviewed. Chief Complaint   Patient presents with    Cyst     ganglion left      Additional information about chief complaint:    Visit Vitals  BP 99/65 (BP 1 Location: Left upper arm, BP Patient Position: Sitting, BP Cuff Size: Large adult)   Pulse 83   Temp 98.5 °F (36.9 °C) (Oral)   Resp 16   Ht 5' 8\" (1.727 m)   Wt 182 lb (82.6 kg)   SpO2 98%   BMI 27.67 kg/m²       Health Maintenance Due   Topic    Hepatitis C Screening     COVID-19 Vaccine (1)    PAP AKA CERVICAL CYTOLOGY        1. Have you been to the ER, urgent care clinic since your last visit? Hospitalized since your last visit? No     2. Have you seen or consulted any other health care providers outside of the [address] [place] [address] since your last visit? Include any pap smears or colon screening.  No     bdg

## 2021-07-29 ENCOUNTER — ROUTINE PRENATAL (OUTPATIENT)
Dept: OBGYN CLINIC | Age: 36
End: 2021-07-29
Payer: COMMERCIAL

## 2021-07-29 VITALS — WEIGHT: 183 LBS | SYSTOLIC BLOOD PRESSURE: 114 MMHG | DIASTOLIC BLOOD PRESSURE: 70 MMHG | BODY MASS INDEX: 27.83 KG/M2

## 2021-07-29 DIAGNOSIS — Z34.90 PREGNANCY, UNSPECIFIED GESTATIONAL AGE: Primary | ICD-10-CM

## 2021-07-29 PROCEDURE — 0502F SUBSEQUENT PRENATAL CARE: CPT | Performed by: OBSTETRICS & GYNECOLOGY

## 2021-07-29 NOTE — PATIENT INSTRUCTIONS
Weeks 22 to 26 of Your Pregnancy: Care Instructions  Overview     As you enter your 7th month of pregnancy at week 26, your baby's lungs are growing stronger and getting ready to breathe. You may notice that your baby responds to the sound of your or your partner's voice. You may also notice that your baby does less turning and twisting and more squirming, kicking, or jerking. Jerking often means that your baby has hiccups. Hiccups are normal and are only temporary. You may want to think about attending a childbirth preparation class. This is also a good time to start thinking about whether you want to have pain medicine during labor. Most pregnant women are tested for gestational diabetes between weeks 25 and 28. Gestational diabetes occurs when your blood sugar level gets too high when you're pregnant. The test is important, because you can have gestational diabetes and not know it. But the condition can cause problems for your baby. Follow-up care is a key part of your treatment and safety. Be sure to make and go to all appointments, and call your doctor if you are having problems. It's also a good idea to know your test results and keep a list of the medicines you take. How can you care for yourself at home? Ease discomfort from your baby's kicking  · Change your position. Sometimes this will cause your baby to change position too. · Take a deep breath while you raise your arm over your head. Then breathe out while you drop your arm. Do Kegel exercises to prevent urine from leaking  · You can do Kegel exercises while you stand or sit. ? Squeeze the same muscles you would use to stop your urine. Your belly and thighs should not move. ? Hold the squeeze for 3 seconds, and then relax for 3 seconds. ? Start with 3 seconds. Then add 1 second each week until you are able to squeeze for 10 seconds. ? Repeat the exercise 10 to 15 times for each session. Do three or more sessions each day.   Ease or reduce swelling in your feet, ankles, hands, and fingers  · If your fingers are puffy, take off your rings. · Do not eat high-salt foods, such as potato chips. · Prop up your feet on a stool or couch as much as possible. Sleep with pillows under your feet. · Do not stand for long periods of time or wear tight shoes. · Wear support stockings. Where can you learn more? Go to http://www.marroquin.com/  Enter G264 in the search box to learn more about \"Weeks 22 to 26 of Your Pregnancy: Care Instructions. \"  Current as of: October 8, 2020               Content Version: 12.8  © 8794-1949 [employer], [employer]. Care instructions adapted under license by [employer] (which disclaims liability or warranty for this information). If you have questions about a medical condition or this instruction, always ask your healthcare professional. [place] [address] any warranty or liability for your use of this information.

## 2021-07-29 NOTE — PROGRESS NOTES
Doing well  Feeling lots of FM! R glut pain, possibly sciatic - happened with previous pregnancies. Glucola and tdap @ [facility].

## 2021-08-18 ENCOUNTER — ROUTINE PRENATAL (OUTPATIENT)
Dept: OBGYN CLINIC | Age: 36
End: 2021-08-18
Payer: COMMERCIAL

## 2021-08-18 VITALS
WEIGHT: 185.4 LBS | SYSTOLIC BLOOD PRESSURE: 100 MMHG | DIASTOLIC BLOOD PRESSURE: 60 MMHG | BODY MASS INDEX: 28.1 KG/M2 | HEIGHT: 68 IN

## 2021-08-18 DIAGNOSIS — O09.529 SUPERVISION OF MULTIGRAVIDA OF ADVANCED MATERNAL AGE, ANTEPARTUM: Primary | ICD-10-CM

## 2021-08-18 LAB
BLOOD BANK CMNT PATIENT-IMP: NORMAL
BLOOD GROUP ANTIBODIES SERPL: NORMAL

## 2021-08-18 PROCEDURE — 0502F SUBSEQUENT PRENATAL CARE: CPT | Performed by: ADVANCED PRACTICE MIDWIFE

## 2021-08-18 NOTE — PROGRESS NOTES
Doing well. Not looking forward to the gluocla test today. She had a bad experience with nausea/vomiting after drinking an orange soda before.   Having some hip pain - this is normal for her in pregnancies- reviewed comfort measures    Declines TDAP today- as she is not tolerating glucola well- will get next visit    SINA 2 weeks

## 2021-08-18 NOTE — PATIENT INSTRUCTIONS
Weeks 26 to 30 of Your Pregnancy: Care Instructions  Overview     You are now entering your last trimester of pregnancy. Your baby is growing quickly. Danielito Mathias probably feel your baby moving around more often. Your doctor may ask you to count your baby's kicks. Your back may ache as your body gets used to your baby's size and length. If you haven't already had the Tdap shot during this pregnancy, talk to your doctor about getting it. It will help protect your  against pertussis infection. During this time, it's important to take care of yourself and pay attention to what your body needs. If you feel sexual, you can explore ways to be close with your partner that match your comfort and desire. Follow-up care is a key part of your treatment and safety. Be sure to make and go to all appointments, and call your doctor if you are having problems. It's also a good idea to know your test results and keep a list of the medicines you take. How can you care for yourself at home? Take it easy at work  · Take frequent breaks. If possible, stop working when you are tired, and rest during your lunch hour. · Take bathroom breaks every 2 hours. · Change positions often. If you sit for long periods, stand up and walk around. · When you stand for a long time, keep one foot on a low stool with your knee bent. After standing a lot, sit with your feet up. · Avoid fumes, chemicals, and tobacco smoke. Be sexual in your own way  · Having sex during pregnancy is okay, unless your doctor tells you not to. · You may be very interested in sex, or you may have no interest at all. · Your growing belly can make it hard to find a good position during intercourse. Waves and explore. · You may get cramps in your uterus when your partner touches your breasts. · A back rub may relieve the backache or cramps that sometimes follow orgasm. Learn about  labor  · Watch for signs of  labor.  You may be going into labor if:  ? You have menstrual-like cramps, with or without nausea. ? You have about 6 or more contractions in 1 hour, even after you have had a glass of water and are resting. ? You have a low, dull backache that does not go away when you change your position. ? You have pain or pressure in your pelvis that comes and goes in a pattern. ? You have intestinal cramping or flu-like symptoms, with or without diarrhea.  ? You notice an increase or change in your vaginal discharge. Discharge may be heavy, mucus-like, watery, or streaked with blood. ? Your water breaks. · If you think you have  labor:  ? Drink 2 or 3 glasses of water or juice. Not drinking enough fluids can cause contractions. ? Stop what you are doing, and empty your bladder. Then lie down on your left side for at least 1 hour. ? While lying on your side, find your breast bone. Put your fingers in the soft spot just below it. Move your fingers down toward your belly button to find the top of your uterus. Check to see if it is tight. ? Contractions can be weak or strong. Record your contractions for an hour. Time a contraction from the start of one contraction to the start of the next one.  ? Single or several strong contractions without a pattern are called Isaac-Morrison contractions. They are practice contractions but not the start of labor. They often stop if you change what you are doing. ? Call your doctor if you have regular contractions. Where can you learn more? Go to http://www.gray.com/  Enter V323 in the search box to learn more about \"Weeks 26 to 30 of Your Pregnancy: Care Instructions. \"  Current as of: 2020               Content Version: 12.8  © 4687-8073 [employer]. Care instructions adapted under license by [employer] (which disclaims liability or warranty for this information).  If you have questions about a medical condition or this instruction, always ask your healthcare professional. [place] [address] any warranty or liability for your use of this information.

## 2021-08-19 LAB
ERYTHROCYTE [DISTWIDTH] IN BLOOD BY AUTOMATED COUNT: 14 % (ref 11.5–14.5)
GLUCOSE 1H P 100 G GLC PO SERPL-MCNC: 88 MG/DL (ref 65–140)
HCT VFR BLD AUTO: 34.2 % (ref 35–47)
HGB BLD-MCNC: 10.6 G/DL (ref 11.5–16)
HIV 1+2 AB+HIV1 P24 AG SERPL QL IA: NONREACTIVE
HIV12 RESULT COMMENT, HHIVC: NORMAL
MCH RBC QN AUTO: 30.4 PG (ref 26–34)
MCHC RBC AUTO-ENTMCNC: 31 G/DL (ref 30–36.5)
MCV RBC AUTO: 98 FL (ref 80–99)
NRBC # BLD: 0 K/UL (ref 0–0.01)
NRBC BLD-RTO: 0 PER 100 WBC
PLATELET # BLD AUTO: 221 K/UL (ref 150–400)
PMV BLD AUTO: 12.1 FL (ref 8.9–12.9)
RBC # BLD AUTO: 3.49 M/UL (ref 3.8–5.2)
T PALLIDUM AB SER QL IA: NON REACTIVE
WBC # BLD AUTO: 8.4 K/UL (ref 3.6–11)

## 2021-09-02 ENCOUNTER — ROUTINE PRENATAL (OUTPATIENT)
Dept: OBGYN CLINIC | Age: 36
End: 2021-09-02
Payer: COMMERCIAL

## 2021-09-02 VITALS — SYSTOLIC BLOOD PRESSURE: 116 MMHG | WEIGHT: 194 LBS | BODY MASS INDEX: 29.5 KG/M2 | DIASTOLIC BLOOD PRESSURE: 68 MMHG

## 2021-09-02 DIAGNOSIS — Z23 ENCOUNTER FOR IMMUNIZATION: ICD-10-CM

## 2021-09-02 DIAGNOSIS — Z34.90 PREGNANCY, UNSPECIFIED GESTATIONAL AGE: Primary | ICD-10-CM

## 2021-09-02 PROCEDURE — 90471 IMMUNIZATION ADMIN: CPT | Performed by: OBSTETRICS & GYNECOLOGY

## 2021-09-02 PROCEDURE — 90715 TDAP VACCINE 7 YRS/> IM: CPT

## 2021-09-02 PROCEDURE — 0502F SUBSEQUENT PRENATAL CARE: CPT | Performed by: OBSTETRICS & GYNECOLOGY

## 2021-09-02 NOTE — PATIENT INSTRUCTIONS
Tdap (Tetanus, Diphtheria, Pertussis) Vaccine: What You Need to Know  Why get vaccinated? Tdap vaccine can prevent tetanus, diphtheria, and pertussis. Diphtheria and pertussis spread from person to person. Tetanus enters the body through cuts or wounds. · TETANUS (T) causes painful stiffening of the muscles. Tetanus can lead to serious health problems, including being unable to open the mouth, having trouble swallowing and breathing, or death. · DIPHTHERIA (D) can lead to difficulty breathing, heart failure, paralysis, or death. · PERTUSSIS (aP), also known as \"whooping cough,\" can cause uncontrollable, violent coughing which makes it hard to breathe, eat, or drink. Pertussis can be extremely serious in babies and young children, causing pneumonia, convulsions, brain damage, or death. In teens and adults, it can cause weight loss, loss of bladder control, passing out, and rib fractures from severe coughing. Tdap vaccine  Tdap is only for children 7 years and older, adolescents, and adults. Adolescents should receive a single dose of Tdap, preferably at age 6 or 15 years. Pregnant women should get a dose of Tdap during every pregnancy, to protect the  from pertussis. Infants are most at risk for severe, life threatening complications from pertussis. Adults who have never received Tdap should get a dose of Tdap. Also, adults should receive a booster dose every 10 years, or earlier in the case of a severe and dirty wound or burn. Booster doses can be either Tdap or Td (a different vaccine that protects against tetanus and diphtheria but not pertussis). Tdap may be given at the same time as other vaccines. Talk with your health care provider  Tell your vaccine provider if the person getting the vaccine:  · Has had an allergic reaction after a previous dose of any vaccine that protects against tetanus, diphtheria, or pertussis, or has any severe, life threatening allergies.   · Has had a coma, decreased level of consciousness, or prolonged seizures within 7 days after a previous dose of any pertussis vaccine (DTP, DTaP, or Tdap). · Has seizures or another nervous system problem. · Has ever had Guillain-Barré Syndrome (also called GBS). · Has had severe pain or swelling after a previous dose of any vaccine that protects against tetanus or diphtheria. In some cases, your health care provider may decide to postpone Tdap vaccination to a future visit. People with minor illnesses, such as a cold, may be vaccinated. People who are moderately or severely ill should usually wait until they recover before getting Tdap vaccine. Your health care provider can give you more information. Risks of a vaccine reaction  · Pain, redness, or swelling where the shot was given, mild fever, headache, feeling tired, and nausea, vomiting, diarrhea, or stomachache sometimes happen after Tdap vaccine. People sometimes faint after medical procedures, including vaccination. Tell your provider if you feel dizzy or have vision changes or ringing in the ears. As with any medicine, there is a very remote chance of a vaccine causing a severe allergic reaction, other serious injury, or death. What if there is a serious problem? An allergic reaction could occur after the vaccinated person leaves the clinic. If you see signs of a severe allergic reaction (hives, swelling of the face and throat, difficulty breathing, a fast heartbeat, dizziness, or weakness), call 9-1-1 and get the person to the nearest hospital.  For other signs that concern you, call your health care provider. Adverse reactions should be reported to the Vaccine Adverse Event Reporting System (VAERS). Your health care provider will usually file this report, or you can do it yourself. Visit the VAERS website at www.vaers. hhs.gov or call 6-217.994.7890. VAERS is only for reporting reactions, and VAERS staff do not give medical advice.   The National Vaccine Injury Compensation Program  The [place] Injury Compensation Program (VICP) is a federal program that was created to compensate people who may have been injured by certain vaccines. Visit the VICP website at www.[facility].gov/vaccinecompensation or call 9-259.302.7663 to learn about the program and about filing a claim. There is a time limit to file a claim for compensation. How can I learn more? · Ask your health care provider. · Call your local or state health department. · Contact the Centers for Disease Control and Prevention (CDC):  ? Call 9-581.458.4929 (2-242-KZU-INFO) or  ? Visit CDC's website at www.cdc.gov/vaccines  Vaccine Information Statement (Interim)  Tdap (Tetanus, Diphtheria, Pertussis) Vaccine  04/01/2020  [address] I[address]ar[address] [id number]  Department of Health and Human Services  Centers for Disease Control and Prevention  Many Vaccine Information Statements are available in Yoruba and other languages. See www.immunize.org/vis. Muchas hojas de información sobre vacunas están disponibles en español y en otros idiomas. Visite www.immunize.org/vis. Care instructions adapted under license by [employer] (which disclaims liability or warranty for this information). If you have questions about a medical condition or this instruction, always ask your healthcare professional. [address] any warranty or liability for your use of this information. Weeks 30 to 32 of Your Pregnancy: Care Instructions  Overview     You've made it to the final months of your pregnancy! By now your baby is really starting to look like a baby, with hair and plump skin. As you enter the final weeks of pregnancy, the reality of having a baby may start to set in. This is a good time to set up a safe nursery and find quality  if needed. Doing this stuff ahead of time will allow you to focus on caring for and enjoying your new baby.  You may also want to take a tour of your hospital's labor and delivery unit. This will help you get a better idea of what to expect while you're in the hospital.  During these last months, be sure to take good care of yourself. Pay attention to what your body needs. If your doctor says it's okay for you to work, don't push yourself too hard. If you haven't already had the Tdap shot during this pregnancy, talk to your doctor about getting it. It will help protect your  against pertussis infection. Follow-up care is a key part of your treatment and safety. Be sure to make and go to all appointments, and call your doctor if you are having problems. It's also a good idea to know your test results and keep a list of the medicines you take. How can you care for yourself at home? Pay attention to your baby's movements  · You should feel your baby move several times every day. · Your baby now turns less, and kicks and jabs more. · Your baby sleeps 20 to 45 minutes at a time and is more active at certain times of day. · If your doctor wants you to count your baby's kicks:  ? Empty your bladder, and lie on your side or relax in a comfortable chair. ? Write down your start time. ? Pay attention only to your baby's movements. Count any movement except hiccups. ? After you have counted 10 movements, write down your stop time. ? Write down how many minutes it took for your baby to move 10 times. ? If an hour goes by and you have not recorded 10 movements, have something to eat or drink and then count for another hour. If you do not record 10 movements in either hour, call your doctor. Ease heartburn  · Eat small, frequent meals. · Do not eat chocolate, peppermint, or very spicy foods. Avoid drinks with caffeine, such as coffee, tea, and sodas. · Avoid bending over or lying down after meals. · Talk a short walk after you eat. · If heartburn is a problem at night, do not eat for 2 hours before bedtime. · Take antacids like Mylanta, Maalox, Rolaids, or Tums.  Do not take antacids that have sodium bicarbonate. Care for varicose veins  · Varicose veins are blood vessels that stretch out with the extra blood during pregnancy. Your legs may ache or throb. Most varicose veins will go away after the birth. · Avoid standing for long periods of time. Sit with your legs crossed at the ankles, not the knees. · Sit with your feet propped up. · Avoid tight clothing or stockings. Wear support hose. · Exercise regularly. Try walking for at least 30 minutes a day. Where can you learn more? Go to http://www.gray.com/  Enter X471 in the search box to learn more about \"Weeks 30 to 32 of Your Pregnancy: Care Instructions. \"  Current as of: October 8, 2020               Content Version: 12.8  © 2006-2021 [employer]. Care instructions adapted under license by [employer] (which disclaims liability or warranty for this information). If you have questions about a medical condition or this instruction, always ask your healthcare professional. [place] [address] any warranty or liability for your use of this information.

## 2021-09-02 NOTE — PROGRESS NOTES
Doing well overall  Active FM  Increased pain in hips/pelvis  Possible R sciatic pain  Tdap done today. Discussed COVID vaccine, pt still unsure. Her mother just got the vaccine. Discussed my pregnancy and EDC.

## 2021-09-16 ENCOUNTER — ROUTINE PRENATAL (OUTPATIENT)
Dept: OBGYN CLINIC | Age: 36
End: 2021-09-16
Payer: COMMERCIAL

## 2021-09-16 VITALS — DIASTOLIC BLOOD PRESSURE: 76 MMHG | SYSTOLIC BLOOD PRESSURE: 124 MMHG | BODY MASS INDEX: 29.19 KG/M2 | WEIGHT: 192 LBS

## 2021-09-16 DIAGNOSIS — Z34.90 PREGNANCY, UNSPECIFIED GESTATIONAL AGE: Primary | ICD-10-CM

## 2021-09-16 PROCEDURE — 0502F SUBSEQUENT PRENATAL CARE: CPT | Performed by: OBSTETRICS & GYNECOLOGY

## 2021-09-16 NOTE — PROGRESS NOTES
Doing well  Very active ERNA. [facility] - painful about 1x/day   Work has been very busy.

## 2021-09-16 NOTE — PATIENT INSTRUCTIONS
Weeks 32 to 34 of Your Pregnancy: Care Instructions  Overview     During the last few weeks of your pregnancy, you may have more aches and pains. It's important to rest when you can. Your growing baby is putting more pressure on your bladder. So you may need to urinate more often. Hemorrhoids are also common. These are painful, itchy veins in the rectal area. You may want to talk with your doctor about banking your baby's umbilical cord blood. This is the blood left in the cord after birth. If you want to save this blood, you must arrange it ahead of time. You can't decide at the last minute. If you haven't already had the Tdap shot during this pregnancy, talk to your doctor about getting it. It will help protect your  against pertussis infection. Follow-up care is a key part of your treatment and safety. Be sure to make and go to all appointments, and call your doctor if you are having problems. It's also a good idea to know your test results and keep a list of the medicines you take. How can you care for yourself at home? Ease hemorrhoids  · Get more liquids, fruits, vegetables, and fiber in your diet. This will help keep your stools soft. · Avoid sitting for too long. Lie on your left side several times a day. · Clean yourself with soft, moist toilet paper. Or you can use witch hazel pads or personal hygiene pads. · If you are uncomfortable, try ice packs. Or you can sit in a warm sitz bath. Do these for 20 minutes at a time, as needed. · Use hydrocortisone cream for pain and itching. Two examples are Anusol and Preparation H Hydrocortisone. · Ask your doctor about taking an over-the-counter stool softener. Consider breastfeeding  · Experts recommend breastfeeding for 1 year or longer. · Breast milk may help protect your child from some health problems.  babies are less likely than formula-fed babies to:  ? Get ear infections, colds, diarrhea, and pneumonia. ?  Be obese or get diabetes later in life. · Breastfeeding causes the release of a hormone called oxytocin. This hormone may help your uterus shrink back faster. · Breastfeeding may help you lose weight faster. Making milk burns calories. · Breastfeeding can lower your risk of breast cancer, ovarian cancer, and osteoporosis. Decide about circumcision for your baby  · As you make this decision, it may help to think about your personal, Yazidism, and family traditions. You get to decide if you will keep your baby's penis natural or if your baby will be circumcised. · If you decide that you would like to have your baby circumcised, talk with your doctor. You can share your concerns about pain. And you can discuss your preferences for anesthesia. Where can you learn more? Go to http://www.[employer].com/  Enter X711 in the search box to learn more about \"Weeks 32 to 34 of Your Pregnancy: Care Instructions. \"  Current as of: June 16, 2021               Content Version: 13.0  © 9514-0309 Healthwise[employer] Incorporated. Care instructions adapted under license by [employer] (which disclaims liability or warranty for this information). If you have questions about a medical condition or this instruction, always ask your healthcare professional. [address] any warranty or liability for your use of this information.

## 2021-10-14 ENCOUNTER — ROUTINE PRENATAL (OUTPATIENT)
Dept: OBGYN CLINIC | Age: 36
End: 2021-10-14
Payer: COMMERCIAL

## 2021-10-14 VITALS — BODY MASS INDEX: 30.56 KG/M2 | DIASTOLIC BLOOD PRESSURE: 68 MMHG | WEIGHT: 201 LBS | SYSTOLIC BLOOD PRESSURE: 106 MMHG

## 2021-10-14 DIAGNOSIS — Z36.9 ANTENATAL SCREENING ENCOUNTER: ICD-10-CM

## 2021-10-14 DIAGNOSIS — Z34.90 PREGNANCY, UNSPECIFIED GESTATIONAL AGE: Primary | ICD-10-CM

## 2021-10-14 PROCEDURE — 0502F SUBSEQUENT PRENATAL CARE: CPT | Performed by: OBSTETRICS & GYNECOLOGY

## 2021-10-14 NOTE — PATIENT INSTRUCTIONS
Weeks 34 to 36 of Your Pregnancy: Care Instructions  Overview     By now, your baby and your belly have grown quite large. It's almost time to give birth! Your baby's lungs are almost ready to breathe air. The skull bones are firm enough to protect your baby's head, but soft enough to move down through the birth canal.  You may be feeling excited and happy at times--but also anxious or scared. You might wonder how you'll know if you're in labor or what to expect during labor. Try to be open and flexible in your expectations of the birth. Because each birth is different, there's no way to know exactly what childbirth will be like for you. Talk to your doctor or midwife about any concerns you have. If you haven't already had the Tdap shot during this pregnancy, talk to your doctor about getting it. It will help protect your  against pertussis infection. In the 36th week, you'll probably have a test for group B streptococcus (GBS). GBS is a common type of bacteria that can live in the vagina and rectum. It can make your baby sick after birth. If you test positive, you will get antibiotics during labor. The medicine will help keep your baby from getting the bacteria. Follow-up care is a key part of your treatment and safety. Be sure to make and go to all appointments, and call your doctor if you are having problems. It's also a good idea to know your test results and keep a list of the medicines you take. How can you care for yourself at home? Learn about pain relief choices  · Pain is different for everyone. Talk with your doctor about your feelings about pain. · You can choose from several types of pain relief. These include medicine, breathing techniques, and comfort measures. You can use more than one option. · If you choose to have pain medicine during labor, talk to your doctor about your options. Some medicines lower anxiety and help with some of the pain.  Others make your lower body numb so that you won't feel pain. · Be sure to tell your doctor about your pain medicine choice before you start labor or very early in your labor. You may be able to change your mind as labor progresses. Labor and delivery  · The first stage of labor has three parts: early, active, and transition. ? It's common to have early labor at home. You can stay busy or rest, eat light snacks, drink clear fluids, and start counting contractions. ? When talking during a contraction gets hard, you may be moving to active labor. During active labor, you should head for the hospital if you aren't there already. ? You are in active labor when contractions come every 3 to 4 minutes and last about 60 seconds. Your cervix is opening more rapidly. ? If your water breaks, contractions will come faster and stronger. ? During transition, your cervix is stretching, and contractions are coming more rapidly. ? You may want to push, but your cervix might not be ready. Your doctor will tell you when to push. · The second stage starts when your cervix is completely opened and you are ready to push. ? Contractions are very strong to push the baby down the birth canal.  ? You will probably feel the urge to push. You may feel like you need to have a bowel movement. ? You may be coached to push with contractions. These contractions will be very strong, but you won't have them as often. You can get a little rest between contractions. ? One last push, and your baby is born. · The third stage is when a few more contractions push out the placenta. This may take 30 minutes or less. Where can you learn more? Go to http://www.gray.com/  Enter B912 in the search box to learn more about \"Weeks 34 to 36 of Your Pregnancy: Care Instructions. \"  Current as of: June 16, 2021               Content Version: 13.0  © 9101-2843 [employer].    Care instructions adapted under license by [employer] (which disclaims liability or warranty for this information). If you have questions about a medical condition or this instruction, always ask your healthcare professional. [address] any warranty or liability for your use of this information.

## 2021-10-14 NOTE — PROGRESS NOTES
Doing well today  Experiencing hot flashes at nights  Feeling some contractions  Active FM  GBS today  Vscan today-  Breech. Discussed maneuvers and will recheck next week and set up ECV if still breech.

## 2021-10-17 LAB
GP B STREP DNA SPEC QL NAA+PROBE: NEGATIVE
SPECIMEN SOURCE: NORMAL

## 2021-10-20 ENCOUNTER — ROUTINE PRENATAL (OUTPATIENT)
Dept: OBGYN CLINIC | Age: 36
End: 2021-10-20
Payer: COMMERCIAL

## 2021-10-20 VITALS — DIASTOLIC BLOOD PRESSURE: 78 MMHG | BODY MASS INDEX: 30.71 KG/M2 | WEIGHT: 202 LBS | SYSTOLIC BLOOD PRESSURE: 122 MMHG

## 2021-10-20 DIAGNOSIS — Z34.90 PREGNANCY, UNSPECIFIED GESTATIONAL AGE: Primary | ICD-10-CM

## 2021-10-20 PROCEDURE — 0502F SUBSEQUENT PRENATAL CARE: CPT | Performed by: OBSTETRICS & GYNECOLOGY

## 2021-10-20 NOTE — PATIENT INSTRUCTIONS
Week 40 of Your Pregnancy: Care Instructions  Overview     You are near the end of your pregnancy--and you're probably pretty uncomfortable. It may be harder to walk around. Lying down probably isn't comfortable either. You may have trouble getting to sleep or staying asleep. Most babies are born between 40 and 41 weeks. This is a good time to think about packing a bag for the hospital with items you'll need. Then you'll be ready when labor starts. Follow-up care is a key part of your treatment and safety. Be sure to make and go to all appointments, and call your doctor if you are having problems. It's also a good idea to know your test results and keep a list of the medicines you take. How can you care for yourself at home? Learn about breastfeeding  · Breastfeeding is best for your baby and good for you. · Breast milk has antibodies to help your baby fight infections. · If you breastfeed, you may lose weight faster. That's because making milk burns calories. · Learning the best ways to hold your baby will make breastfeeding easier. · Sometimes breastfeeding can make partners feel left out. If you have a partner, plan how you can care for your baby together. For example, your partner can bathe and diaper the baby. You can snuggle together when you breastfeed. · You may want to learn how to use a breast pump and store your milk. · If you choose to bottle feed, make the feeding feel like breastfeeding so you can bond with your baby. Always hold your baby and the bottle. Don't prop bottles or let your baby fall asleep with a bottle. Learn about crying  · It's common for babies to cry for 1 to 3 hours a day. Some cry more, and some cry less. · Babies don't cry to make you upset or because you're a bad parent. · Crying is how your baby communicates. Your baby may be hungry; have gas; need a diaper change; or feel cold, warm, tired, lonely, or tense. Sometimes babies cry for unknown reasons.   · If you respond to your baby's needs, your baby will learn to trust you. · Try to stay calm when your baby cries. Your baby may get more upset if they sense that you are upset. Know how to care for your   · Your baby's umbilical cord stump will drop off on its own, usually between 1 and 2 weeks. To care for your baby's umbilical cord area:  ? Clean the area at the bottom of the cord 2 or 3 times a day. ? Pay special attention to the area where the cord attaches to the skin. ? Keep the diaper folded below the cord. ? Use a damp washcloth or cotton ball to sponge bathe your baby until the stump has come off. · Your baby's first dark stool is called meconium. After the meconium is passed, your baby will develop their own bowel pattern. ? Some babies, especially  babies, have several bowel movements a day. Others have one or two a day, or one every 2 to 3 days. ?  babies often have loose, yellow stools. Formula-fed babies have more formed stools. ? If your baby's stools look like little pellets, your baby is constipated. After 2 days of constipation, call your baby's doctor. · If your baby will be circumcised, you can care for your baby at home. ? Gently rinse your baby's penis with warm water after every diaper change. Don't try to remove the film that forms on the penis. This film will go away on its own. Pat dry. ? Put petroleum ointment, such as Vaseline, on the area of the diaper that will touch your baby's penis. This will keep the diaper from sticking to your baby. ? Ask the doctor about giving your baby acetaminophen (Tylenol) for pain. Where can you learn more? Go to http://www.gray.com/  Enter N257 in the search box to learn more about \"Week 37 of Your Pregnancy: Care Instructions. \"  Current as of: 2021               Content Version: 13.0  © 9245-5131 Healthwise, Incorporated.    Care instructions adapted under license by [employer] (which disclaims liability or warranty for this information). If you have questions about a medical condition or this instruction, always ask your healthcare professional. [address] any warranty or liability for your use of this information.

## 2021-10-20 NOTE — PROGRESS NOTES
Doing well overall  Active FM - movements feel about the same as last week  Repeat Vscan - still breech. Discussed ECV again and pt opts for ECV - will set up for next week.

## 2021-10-25 ENCOUNTER — HOSPITAL ENCOUNTER (OUTPATIENT)
Age: 36
Discharge: HOME | End: 2021-10-25
Attending: OBSTETRICS & GYNECOLOGY | Admitting: OBSTETRICS & GYNECOLOGY
Payer: COMMERCIAL

## 2021-10-25 VITALS
WEIGHT: 201 LBS | HEART RATE: 104 BPM | RESPIRATION RATE: 18 BRPM | DIASTOLIC BLOOD PRESSURE: 81 MMHG | BODY MASS INDEX: 30.46 KG/M2 | SYSTOLIC BLOOD PRESSURE: 140 MMHG | TEMPERATURE: 98.9 F | HEIGHT: 68 IN

## 2021-10-25 LAB
ERYTHROCYTE [DISTWIDTH] IN BLOOD BY AUTOMATED COUNT: 14.3 % (ref 11.5–14.5)
HCT VFR BLD AUTO: 32.3 % (ref 35–47)
HGB BLD-MCNC: 10.3 G/DL (ref 11.5–16)
MCH RBC QN AUTO: 29.4 PG (ref 26–34)
MCHC RBC AUTO-ENTMCNC: 31.9 G/DL (ref 30–36.5)
MCV RBC AUTO: 92.3 FL (ref 80–99)
NRBC # BLD: 0 K/UL (ref 0–0.01)
NRBC BLD-RTO: 0 PER 100 WBC
PLATELET # BLD AUTO: 217 K/UL (ref 150–400)
PMV BLD AUTO: 12.2 FL (ref 8.9–12.9)
RBC # BLD AUTO: 3.5 M/UL (ref 3.8–5.2)
WBC # BLD AUTO: 8.9 K/UL (ref 3.6–11)

## 2021-10-25 PROCEDURE — 59412 ANTEPARTUM MANIPULATION: CPT | Performed by: OBSTETRICS & GYNECOLOGY

## 2021-10-25 PROCEDURE — 96372 THER/PROPH/DIAG INJ SC/IM: CPT

## 2021-10-25 PROCEDURE — 96361 HYDRATE IV INFUSION ADD-ON: CPT

## 2021-10-25 PROCEDURE — 99285 EMERGENCY DEPT VISIT HI MDM: CPT

## 2021-10-25 PROCEDURE — 59412 ANTEPARTUM MANIPULATION: CPT

## 2021-10-25 PROCEDURE — 85027 COMPLETE CBC AUTOMATED: CPT

## 2021-10-25 PROCEDURE — 96360 HYDRATION IV INFUSION INIT: CPT

## 2021-10-25 PROCEDURE — 36415 COLL VENOUS BLD VENIPUNCTURE: CPT

## 2021-10-25 PROCEDURE — 74011250636 HC RX REV CODE- 250/636: Performed by: OBSTETRICS & GYNECOLOGY

## 2021-10-25 PROCEDURE — 99281 EMR DPT VST MAYX REQ PHY/QHP: CPT

## 2021-10-25 PROCEDURE — 99283 EMERGENCY DEPT VISIT LOW MDM: CPT

## 2021-10-25 PROCEDURE — 99282 EMERGENCY DEPT VISIT SF MDM: CPT

## 2021-10-25 PROCEDURE — 99284 EMERGENCY DEPT VISIT MOD MDM: CPT

## 2021-10-25 RX ORDER — TERBUTALINE SULFATE 1 MG/ML
0.25 INJECTION SUBCUTANEOUS AS NEEDED
Status: DISCONTINUED | OUTPATIENT
Start: 2021-10-25 | End: 2021-10-25 | Stop reason: HOSPADM

## 2021-10-25 RX ORDER — SODIUM CHLORIDE, SODIUM LACTATE, POTASSIUM CHLORIDE, CALCIUM CHLORIDE 600; 310; 30; 20 MG/100ML; MG/100ML; MG/100ML; MG/100ML
125 INJECTION, SOLUTION INTRAVENOUS CONTINUOUS
Status: DISCONTINUED | OUTPATIENT
Start: 2021-10-25 | End: 2021-10-25 | Stop reason: HOSPADM

## 2021-10-25 RX ADMIN — SODIUM CHLORIDE, POTASSIUM CHLORIDE, SODIUM LACTATE AND CALCIUM CHLORIDE 125 ML/HR: 600; 310; 30; 20 INJECTION, SOLUTION INTRAVENOUS at 10:03

## 2021-10-25 RX ADMIN — TERBUTALINE SULFATE 0.25 MG: 1 INJECTION SUBCUTANEOUS at 12:25

## 2021-10-25 NOTE — DISCHARGE INSTRUCTIONS
Patient Education     DISCHARGE SUMMARY from Nurse    PATIENT INSTRUCTIONS:    After general anesthesia or intravenous sedation, for 24 hours or while taking prescription Narcotics:  · Limit your activities  · Do not drive and operate hazardous machinery  · Do not make important personal or business decisions  · Do  not drink alcoholic beverages  · If you have not urinated within 8 hours after discharge, please contact your surgeon on call. Report the following to your surgeon:  · Excessive pain, swelling, redness or odor of or around the surgical area  · Temperature over 100.5  · Nausea and vomiting lasting longer than 4 hours or if unable to take medications  · Any signs of decreased circulation or nerve impairment to extremity: change in color, persistent  numbness, tingling, coldness or increase pain  · Any questions    What to do at Home:  Recommended activity: Activity as tolerated,     If you experience any of the following symptoms see s/p version instructions, please follow up with Dr. Teresa Joshua and if no symptoms, see Dr. Teresa Joshua on Thursday 1028/21. *  Please give a list of your current medications to your Primary Care Provider. *  Please update this list whenever your medications are discontinued, doses are      changed, or new medications (including over-the-counter products) are added. *  Please carry medication information at all times in case of emergency situations. These are general instructions for a healthy lifestyle:    No smoking/ No tobacco products/ Avoid exposure to second hand smoke  Surgeon General's Warning:  Quitting smoking now greatly reduces serious risk to your health.     Obesity, smoking, and sedentary lifestyle greatly increases your risk for illness    A healthy diet, regular physical exercise & weight monitoring are important for maintaining a healthy lifestyle    You may be retaining fluid if you have a history of heart failure or if you experience any of the following symptoms:  Weight gain of 3 pounds or more overnight or 5 pounds in a week, increased swelling in our hands or feet or shortness of breath while lying flat in bed. Please call your doctor as soon as you notice any of these symptoms; do not wait until your next office visit. The discharge information has been reviewed with the patient. The patient verbalized understanding. Discharge medications reviewed with the patient and appropriate educational materials and side effects teaching were provided. ___________________________________________________________________________________________________________________________________     Turning a Breech Baby: Care Instructions  Overview     At the end of most pregnancies, the baby's head is near the birth canal (vagina). But sometimes a baby's rear end or feet are near the birth canal. This position is called breech. If your baby stays in this breech position, you will probably need a  section (). Most breech babies are healthy and don't have problems after birth. Your doctor may try to turn your baby. To do this, the doctor presses on certain places on your belly. Sometimes this causes the baby to turn. The medical name for this process is external cephalic version. During the process of trying to turn your baby, your doctor will carefully watch your uterus. There's a chance that the pressure and movement might start contractions. There's also a chance that the umbilical cord will twist or get damaged. If your baby turns, your doctor may send you home. But the doctor will check you often until your labor starts. If your baby's head stays down, you may be able to have a vaginal delivery. But a small number of babies move back into a breech position. Follow-up care is a key part of your treatment and safety. Be sure to make and go to all appointments, and call your doctor if you are having problems.  It's also a good idea to know your test results and keep a list of the medicines you take. How is the procedure done? · Your doctor will give you medicine to relax your uterus. Your doctor may give you medicine for pain and to help you relax. · Your doctor will put both hands on your belly. One hand will be near the baby's head. The other hand will be near the baby's rear end. The doctor will push and roll the baby to try to get the head down. · You may feel some pain. The doctor will ask you how you are doing. · Your doctor will use a heart monitor to see how your baby is doing. · After the process, your doctor will give you instructions for your care. When should you call for help? Call 911 anytime you think you may need emergency care. For example, call if:    · You passed out (lost consciousness).     · You have severe vaginal bleeding. Call your doctor now or seek immediate medical care if:    · You have any vaginal bleeding.     · You have pain in your belly or pelvis.     · You think that you are in labor.     · You have a sudden release of fluid from your vagina.     · You notice that your baby has stopped moving or is moving much less than normal.   Watch closely for changes in your health, and be sure to contact your doctor if you have any questions or concerns. Where can you learn more? Go to http://www.gray.com/  Enter I027 in the search box to learn more about \"Turning a Breech Baby: Care Instructions. \"  Current as of: June 16, 2021               Content Version: 13.0  © 2006-2021 [employer]. Care instructions adapted under license by [employer] (which disclaims liability or warranty for this information). If you have questions about a medical condition or this instruction, always ask your healthcare professional. [address] any warranty or liability for your use of this information.

## 2021-10-25 NOTE — PROGRESS NOTES
External Cephalic Version    Procedure: external cephalic version  Pre-operative diagnosis: IUP - Breech presentation  Post-operative diagnosis: IUP - Breech presentation  Indications: IUP - Breech presentation  Surgeon: Pavan Dang MD  Assistant: Roel Kwon MD  Anesthesia: none  Complications: none  EBL: none  Findings:    -FHTs: reactive, cat 1   -[place]: irreg ctx   -ultrasound/KOKI: breech presentation with fetal back to the maternal spine, subjectively adequately fluid  Medications: Terbutaline 0.25mg SQ  Disposition: pending fetal and maternal surveillance post-procedure    Procedure:   Bedside ultrasound was performed and confirmed fetal breech presentation. +subjectively adequate amniotic fluid. Reactive fetal surveillance prior to procedure. Rare contractions on tocometry. Pt feeling well and asymptomatic. Gel applied to maternal abdomen. Fetal breech was elevated out of pelvis and gentle guidance was applied to fetal head. Baby was turned in clockwise direction in attempt to achieve forward roll, this was not successful. Next, the baby was attempted to be turned in a backwards roll. Baby was not successfully turned to cephalic presentation. Fetal heart tones were evaluated throughout, with reassuring surveillance both during and immediately following procedure. Pt tolerated procedure well. Plan to monitor patient for 1 hour after procedure and if fetal surveillance remains reactive, and patient remains asymptomatic will plan to discharge pt home at that time. Will set up scheduled c/s. Pavan Dang MD        To whom it may concern,    This letter is to confirm that it was medically necessary to have a skilled second physician, Dr. Negin Pandey, assisting with this patient's external cephalic version procedure. Success of the procedure is dependent on one provider elevating the breech from the pelvis, and the other provider guiding the fetal head into the appropriate position.  The skilled provider has to understand the appropriate amount of force that is required to safely turn a baby in utero. It is also essential to have a second provider to assist with operating the ultrasound machine during the procedure, as it is necessary to continually reevaluate fetal position and heart rate to ensure the safety and success of the procedure. Furthermore, external cephalic version procedures are not without significant risks (including, but not limited to, things such as premature rupture of membranes, cord prolapse, placental abruption,  labor, or uterine rupture). These events can be life threatening to mom or baby should they occur. Additionally, there is a rare but real chance of needing to proceed with an emergency  section for any of the aforementioned reasons, and having a second physician available enhances patient safety should such an event occur. It should be noted that if an external cephalic version is successful, it gives the patient the opportunity to try for a vaginal birth, which would avoid a much more costly  section. Thus, for all of these reasons, it is ambrocio to have a skilled second physician available during an external cephalic version procedure.     Thank you,  Dr. Chetan Gonzalez

## 2021-10-25 NOTE — PROGRESS NOTES
A  admitted to LR 7 under the services of Dr. Mariana Rutledge for version, breech presentation. Pt denies complications with this pregnancy or her previous 2 pregnancies and vaginal deliveries. 1003:  IV started in L hand, see MAR. Labs drawn with IV start. 1023:  Dr. Mariana Rutledge at bedside to assess pt. Monitors off and US per Dr. Mariana Rutledge, breech presentation confirmed. Dr. Mariana Rutledge states she will be back at noon to do version. 1225:  Monitors off for version per Dr. Mariana Rutledge and Dr. Emmy Ramires. 1240:  Version attempt continued per Dr. Mariana Rutledge and Dr. Emmy Ramires with intermittent check on FHR-reactive, and on position. 1250:   Version attempt continued per Dr. Mariana Rutledge and Dr. Emmy Ramires with intermittent check on FHR-reactive, and on position. 1258: Attempted version stopped, unsuccessful at this time, FHR checked- reactive and breech presentation confirmed. Orders received for pt to be monitored for 1 hour and then may eat and be discharged home. Pt is to see Dr. Emmy Ramires on Thursday to discuss plan for c/section if baby is still breech. 1408:  Monitors off, pt up to BR to change. 1418:  Reviewed discharge instructions with pt, copy given and pt e-signed. Pt is to see Dr. Mariana Rutledge on 10/28/21 with regular scheduled appt. 1421:  Pt discharged ambulatory, undelivered accompanied by .

## 2021-10-25 NOTE — H&P
History and Physical    Patient: Alex Bagley MRN: [id number]  SSN: [id number]    YOB: 1985  Age: 39 y.o. Sex: female      Subjective:      Alex Bagley is a 39 y.o. female who presents for a scheduled ECV for breech fetal presentation. Patient Active Problem List    Diagnosis    Pregnant     ECV 10/25/21  Primary Provider: Jose West,  x2, TT [id number] (both delivered by Dr. Chica Dodd)  [facility] 39 by 9 wk     Pregnancy problems:  Pt accepts blood products in true emergency only: okay per pt to let /mother make decision if pt not able to (advanced directive in her chart scanned in 3/2020)  AMA: NIPT declined, FS with MFM done  Staph UTI: at first visit, tx macrobid done, ALEXANDRA / with enterococcus - tx with macrobid, ALEXANDRA neg    Breech: at 36 and 37 weeks, schedule ECV _____    IOB labs: A pos, normal, staph UTI, GC/Chl neg  Genetic Screening: declines genetics  Anatomy: GIRL!!! Normal anatomy with MFM, posterior placenta  Flu:  TDAP: done   Third Tri Labs: normal  GBS: neg  Pt not COVID vaccinated, discussed     Pain mgmt. in labor:  Feeding:  Social:  Melia Fernandez born in  in [place] born in 3/2020.               Past Medical History:   Diagnosis Date    Asthma     As a child    Chlamydia     15 years ago    Heart abnormality     heart murmur    Heart murmur     As a child    Postpartum depression     after first delivery, \"mild\" , no medications    Vaginal delivery 3/18/2020     Past Surgical History:   Procedure Laterality Date    HX OTHER SURGICAL  2019    D&C      Family History   Problem Relation Age of Onset    Other Mother         high cholesterol    Hypertension Father     Diabetes Father     Other Father         CHF    Cancer Maternal Grandfather         bladder    Cancer Other         lung     Social History     Tobacco Use    Smoking status: Never Smoker    Smokeless tobacco: Never Used   Substance Use Topics    Alcohol use: Not Currently Comment: occ social      Prior to Admission medications    Medication Sig Start Date End Date Taking? Authorizing Provider   prenatal multivit-ca-min-fe-fa (Prenatal Vitamin) tab Take  by mouth. Yes Provider, Historical        Allergies   Allergen Reactions    Penicillin G Hives     childhood       Review of Systems:  A comprehensive review of systems was negative except for that written in the History of Present Illness. Objective:     Vitals:    10/25/21 1001 10/25/21 1048   BP: 120/75    Pulse: 99    Resp: 18    Temp: 98.5 °F (36.9 °C)    Weight:  201 lb (91.2 kg)   Height:  5' 8\" (1.727 m)        Physical Exam:  GENERAL: alert, cooperative, no distress, appears stated age  LUNG: clear to auscultation bilaterally  HEART: regular rate and rhythm  ABDOMEN: soft, non-tender.  gravid  EXTREMITIES:  extremities normal, atraumatic, no cyanosis or edema  SKIN: Normal.  NEUROLOGIC: negative  PSYCHIATRIC: non focal    Assessment:     Hospital Problems  Date Reviewed: 10/20/2021    None          Plan:     Breech fetal presentation  - breech presentation confirmed on US today  - pt consents to ECV, consent signed  - terbutaline pre-procedure    Signed By: Kp Lou MD     October 25, 2021

## 2021-10-28 ENCOUNTER — ROUTINE PRENATAL (OUTPATIENT)
Dept: OBGYN CLINIC | Age: 36
End: 2021-10-28
Payer: COMMERCIAL

## 2021-10-28 VITALS — WEIGHT: 201 LBS | BODY MASS INDEX: 30.56 KG/M2 | SYSTOLIC BLOOD PRESSURE: 126 MMHG | DIASTOLIC BLOOD PRESSURE: 86 MMHG

## 2021-10-28 DIAGNOSIS — Z34.90 PREGNANCY, UNSPECIFIED GESTATIONAL AGE: Primary | ICD-10-CM

## 2021-10-28 PROCEDURE — 0502F SUBSEQUENT PRENATAL CARE: CPT | Performed by: OBSTETRICS & GYNECOLOGY

## 2021-10-28 NOTE — PATIENT INSTRUCTIONS
Week 38 of Your Pregnancy: Care Instructions  Overview     Believe it or not, your baby is almost here. You may have ideas about your baby's personality because of how much your baby moves. Or you may have noticed how your baby responds to sounds, warmth, cold, and light. You may even know what kind of music your baby likes. By now, you have a better idea of what to expect during delivery. You may have talked about your birth preferences with your doctor. But even if you want a vaginal birth, it's a good idea to learn about  births.  birth means that your baby is born through a cut (incision) in your lower belly. In some cases it may be the best choice for the health of you and your baby. Follow-up care is a key part of your treatment and safety. Be sure to make and go to all appointments, and call your doctor if you are having problems. It's also a good idea to know your test results and keep a list of the medicines you take. How can you care for yourself at home? Learn about  birth  · Most C-sections are unplanned. They are done because of problems that occur during labor. These problems might include:  ? Labor that slows or stops. ? High blood pressure or other problems for you.  ? Signs of distress in your baby. These signs may include a very fast or slow heart rate. · Although you and your baby are likely do well after a , it is major surgery. It has more risks than a vaginal delivery. · In some cases, a planned  may be safer than a vaginal delivery. This may be the case if:  ? You have a health problem, such as a heart condition. ? Your baby isn't in a head-down position for delivery. This is called a breech position. ? The uterus has scars from past surgeries. This could increase the chance of a tear in the uterus. ? There is a problem with the placenta.  ? You have an infection, such as genital herpes, that could be spread to your baby.   ? You are having twins or more. ? Your baby weighs 9 to 10 pounds or more. · Because of the risks of a , planned C-sections generally should be done only for medical reasons. And a planned  should be done at 39 weeks or later unless there is a medical reason to do it sooner. Know what to expect after delivery, and plan for the first few weeks at home  · You, your baby, and your partner or  will get identification bands. Only people with matching bands can  the baby from the nursery. · You will learn how to feed, diaper, and bathe your baby. And you will learn how to care for the umbilical cord stump. If your baby will be circumcised, you will also learn how to care for that. · Ask people to wait to visit you until you are at home. And ask them to wash their hands before they touch your baby. · Make sure you have another adult in your home for at least 2 or 3 days after the birth. · During the first 2 weeks, limit when friends and family can visit. · Do not allow visitors who have colds or infections. Make sure all visitors are up to date with their vaccinations. Never let anyone smoke around your baby. · Try to nap when the baby naps. Be aware of postpartum depression  · \"Baby blues\" are common for the first 1 to 2 weeks after birth. You may cry or feel sad or irritable for no reason. · Sometimes these feelings last longer and are more intense. This is called postpartum depression. · If your symptoms last for more than a few weeks or you feel very depressed, ask your doctor for help. · Postpartum depression can be treated. Support groups and counseling can help. Sometimes medicine can also help. Where can you learn more? Go to http://www.gray.com/  Enter B044 in the search box to learn more about \"Week 38 of Your Pregnancy: Care Instructions. \"  Current as of: 2021               Content Version: 13.0  © 5153-7904 [employer], [facility].    Care instructions adapted under license by [employer] (which disclaims liability or warranty for this information). If you have questions about a medical condition or this instruction, always ask your healthcare professional. [address] any warranty or liability for your use of this information.

## 2021-10-28 NOTE — PROGRESS NOTES
Doing well overall  Active FM  Ctx more often now, not painful  ECV unsuccessful, CS delivery planned for 11/4  Baby still breech on bedside US today. Discussed c/s in detail.

## 2021-11-01 ENCOUNTER — TRANSCRIBE ORDER (OUTPATIENT)
Dept: REGISTRATION | Age: 36
End: 2021-11-01

## 2021-11-01 ENCOUNTER — HOSPITAL ENCOUNTER (OUTPATIENT)
Dept: PREADMISSION TESTING | Age: 36
Discharge: HOME | End: 2021-11-01
Payer: COMMERCIAL

## 2021-11-01 DIAGNOSIS — Z01.812 PRE-PROCEDURE LAB EXAM: Primary | ICD-10-CM

## 2021-11-01 DIAGNOSIS — Z01.812 PRE-PROCEDURE LAB EXAM: ICD-10-CM

## 2021-11-01 PROCEDURE — U0005 INFEC AGEN DETEC AMPLI PROBE: HCPCS

## 2021-11-03 ENCOUNTER — ANESTHESIA EVENT (OUTPATIENT)
Dept: LABOR AND DELIVERY | Age: 36
End: 2021-11-03
Payer: COMMERCIAL

## 2021-11-03 LAB
SARS-COV-2, XPLCVT: NOT DETECTED
SOURCE, COVRS: NORMAL

## 2021-11-03 RX ORDER — MORPHINE SULFATE 10 MG/ML
10 INJECTION, SOLUTION INTRAMUSCULAR; INTRAVENOUS
Status: CANCELLED | OUTPATIENT
Start: 2021-11-03 | End: 2021-11-04

## 2021-11-03 RX ORDER — ONDANSETRON 2 MG/ML
4 INJECTION INTRAMUSCULAR; INTRAVENOUS
Status: CANCELLED | OUTPATIENT
Start: 2021-11-03

## 2021-11-03 RX ORDER — MORPHINE SULFATE 10 MG/ML
6 INJECTION, SOLUTION INTRAMUSCULAR; INTRAVENOUS
Status: CANCELLED | OUTPATIENT
Start: 2021-11-03 | End: 2021-11-04

## 2021-11-03 RX ORDER — KETOROLAC TROMETHAMINE 30 MG/ML
30 INJECTION, SOLUTION INTRAMUSCULAR; INTRAVENOUS
Status: CANCELLED | OUTPATIENT
Start: 2021-11-03 | End: 2021-11-06

## 2021-11-04 ENCOUNTER — HOSPITAL ENCOUNTER (INPATIENT)
Age: 36
LOS: 3 days | Discharge: HOME | End: 2021-11-07
Attending: OBSTETRICS & GYNECOLOGY | Admitting: OBSTETRICS & GYNECOLOGY
Payer: COMMERCIAL

## 2021-11-04 ENCOUNTER — ANESTHESIA (OUTPATIENT)
Dept: LABOR AND DELIVERY | Age: 36
End: 2021-11-04
Payer: COMMERCIAL

## 2021-11-04 LAB
ABO + RH BLD: NORMAL
BLOOD GROUP ANTIBODIES SERPL: NORMAL
ERYTHROCYTE [DISTWIDTH] IN BLOOD BY AUTOMATED COUNT: 14.2 % (ref 11.5–14.5)
HCT VFR BLD AUTO: 30.9 % (ref 35–47)
HGB BLD-MCNC: 10 G/DL (ref 11.5–16)
MCH RBC QN AUTO: 29.2 PG (ref 26–34)
MCHC RBC AUTO-ENTMCNC: 32.4 G/DL (ref 30–36.5)
MCV RBC AUTO: 90.4 FL (ref 80–99)
NRBC # BLD: 0 K/UL (ref 0–0.01)
NRBC BLD-RTO: 0 PER 100 WBC
PLATELET # BLD AUTO: 233 K/UL (ref 150–400)
PMV BLD AUTO: 11.7 FL (ref 8.9–12.9)
RBC # BLD AUTO: 3.42 M/UL (ref 3.8–5.2)
SPECIMEN EXP DATE BLD: NORMAL
WBC # BLD AUTO: 7.9 K/UL (ref 3.6–11)

## 2021-11-04 PROCEDURE — 59510 CESAREAN DELIVERY: CPT | Performed by: OBSTETRICS & GYNECOLOGY

## 2021-11-04 PROCEDURE — 76010000392 HC C SECN EA ADDL 0.5 HR: Performed by: OBSTETRICS & GYNECOLOGY

## 2021-11-04 PROCEDURE — 65410000002 HC RM PRIVATE OB

## 2021-11-04 PROCEDURE — 76010000391 HC C SECN FIRST 1 HR: Performed by: OBSTETRICS & GYNECOLOGY

## 2021-11-04 PROCEDURE — 74011250636 HC RX REV CODE- 250/636: Performed by: ANESTHESIOLOGY

## 2021-11-04 PROCEDURE — 86901 BLOOD TYPING SEROLOGIC RH(D): CPT

## 2021-11-04 PROCEDURE — 74011250636 HC RX REV CODE- 250/636: Performed by: NURSE ANESTHETIST, CERTIFIED REGISTERED

## 2021-11-04 PROCEDURE — 74011250636 HC RX REV CODE- 250/636: Performed by: OBSTETRICS & GYNECOLOGY

## 2021-11-04 PROCEDURE — 77030007866 HC KT SPN ANES BBMI -B: Performed by: NURSE ANESTHETIST, CERTIFIED REGISTERED

## 2021-11-04 PROCEDURE — 76060000078 HC EPIDURAL ANESTHESIA: Performed by: OBSTETRICS & GYNECOLOGY

## 2021-11-04 PROCEDURE — 85027 COMPLETE CBC AUTOMATED: CPT

## 2021-11-04 PROCEDURE — 75410000003 HC RECOV DEL/VAG/CSECN EA 0.5 HR: Performed by: OBSTETRICS & GYNECOLOGY

## 2021-11-04 PROCEDURE — 74011000250 HC RX REV CODE- 250: Performed by: ANESTHESIOLOGY

## 2021-11-04 PROCEDURE — 74011000250 HC RX REV CODE- 250: Performed by: OBSTETRICS & GYNECOLOGY

## 2021-11-04 PROCEDURE — 36415 COLL VENOUS BLD VENIPUNCTURE: CPT

## 2021-11-04 RX ORDER — EPHEDRINE SULFATE/0.9% NACL/PF 50 MG/5 ML
10 SYRINGE (ML) INTRAVENOUS ONCE
Status: COMPLETED | OUTPATIENT
Start: 2021-11-04 | End: 2021-11-04

## 2021-11-04 RX ORDER — ACETAMINOPHEN 325 MG/1
650 TABLET ORAL
Status: DISCONTINUED | OUTPATIENT
Start: 2021-11-04 | End: 2021-11-07 | Stop reason: HOSPADM

## 2021-11-04 RX ORDER — OXYTOCIN/RINGER'S LACTATE 30/500 ML
87.3 PLASTIC BAG, INJECTION (ML) INTRAVENOUS AS NEEDED
Status: COMPLETED | OUTPATIENT
Start: 2021-11-04 | End: 2021-11-04

## 2021-11-04 RX ORDER — KETOROLAC TROMETHAMINE 30 MG/ML
30 INJECTION, SOLUTION INTRAMUSCULAR; INTRAVENOUS EVERY 6 HOURS
Status: DISPENSED | OUTPATIENT
Start: 2021-11-04 | End: 2021-11-05

## 2021-11-04 RX ORDER — HYDROCORTISONE 1 %
CREAM (GRAM) TOPICAL AS NEEDED
Status: DISCONTINUED | OUTPATIENT
Start: 2021-11-04 | End: 2021-11-07

## 2021-11-04 RX ORDER — BUPIVACAINE HYDROCHLORIDE 5 MG/ML
INJECTION, SOLUTION EPIDURAL; INTRACAUDAL
Status: COMPLETED | OUTPATIENT
Start: 2021-11-04 | End: 2021-11-04

## 2021-11-04 RX ORDER — SODIUM CHLORIDE 0.9 % (FLUSH) 0.9 %
5-40 SYRINGE (ML) INJECTION EVERY 8 HOURS
Status: DISCONTINUED | OUTPATIENT
Start: 2021-11-04 | End: 2021-11-07 | Stop reason: HOSPADM

## 2021-11-04 RX ORDER — EPHEDRINE SULFATE/0.9% NACL/PF 50 MG/5 ML
SYRINGE (ML) INTRAVENOUS
Status: DISPENSED
Start: 2021-11-04 | End: 2021-11-05

## 2021-11-04 RX ORDER — DOCUSATE SODIUM 100 MG/1
100 CAPSULE, LIQUID FILLED ORAL
Status: DISCONTINUED | OUTPATIENT
Start: 2021-11-04 | End: 2021-11-07 | Stop reason: HOSPADM

## 2021-11-04 RX ORDER — ONDANSETRON 2 MG/ML
4 INJECTION INTRAMUSCULAR; INTRAVENOUS
Status: DISCONTINUED | OUTPATIENT
Start: 2021-11-04 | End: 2021-11-07 | Stop reason: HOSPADM

## 2021-11-04 RX ORDER — SODIUM CHLORIDE, SODIUM LACTATE, POTASSIUM CHLORIDE, CALCIUM CHLORIDE 600; 310; 30; 20 MG/100ML; MG/100ML; MG/100ML; MG/100ML
125 INJECTION, SOLUTION INTRAVENOUS CONTINUOUS
Status: DISCONTINUED | OUTPATIENT
Start: 2021-11-04 | End: 2021-11-07 | Stop reason: HOSPADM

## 2021-11-04 RX ORDER — IBUPROFEN 400 MG/1
800 TABLET ORAL EVERY 8 HOURS
Status: DISCONTINUED | OUTPATIENT
Start: 2021-11-04 | End: 2021-11-07 | Stop reason: HOSPADM

## 2021-11-04 RX ORDER — SODIUM CHLORIDE 0.9 % (FLUSH) 0.9 %
5-40 SYRINGE (ML) INJECTION AS NEEDED
Status: DISCONTINUED | OUTPATIENT
Start: 2021-11-04 | End: 2021-11-07 | Stop reason: HOSPADM

## 2021-11-04 RX ORDER — PHENYLEPHRINE HCL IN 0.9% NACL 0.4MG/10ML
SYRINGE (ML) INTRAVENOUS AS NEEDED
Status: DISCONTINUED | OUTPATIENT
Start: 2021-11-04 | End: 2021-11-04 | Stop reason: HOSPADM

## 2021-11-04 RX ORDER — OXYTOCIN/RINGER'S LACTATE 30/500 ML
10 PLASTIC BAG, INJECTION (ML) INTRAVENOUS AS NEEDED
Status: DISCONTINUED | OUTPATIENT
Start: 2021-11-04 | End: 2021-11-07 | Stop reason: HOSPADM

## 2021-11-04 RX ORDER — OXYTOCIN/RINGER'S LACTATE 30/500 ML
87.3 PLASTIC BAG, INJECTION (ML) INTRAVENOUS AS NEEDED
Status: DISCONTINUED | OUTPATIENT
Start: 2021-11-04 | End: 2021-11-07 | Stop reason: HOSPADM

## 2021-11-04 RX ORDER — SIMETHICONE 80 MG
80 TABLET,CHEWABLE ORAL
Status: DISCONTINUED | OUTPATIENT
Start: 2021-11-04 | End: 2021-11-07 | Stop reason: HOSPADM

## 2021-11-04 RX ORDER — AMMONIA 15 % (W/V)
1 AMPUL (EA) INHALATION AS NEEDED
Status: DISCONTINUED | OUTPATIENT
Start: 2021-11-04 | End: 2021-11-07 | Stop reason: HOSPADM

## 2021-11-04 RX ORDER — ONDANSETRON 2 MG/ML
INJECTION INTRAMUSCULAR; INTRAVENOUS AS NEEDED
Status: DISCONTINUED | OUTPATIENT
Start: 2021-11-04 | End: 2021-11-04 | Stop reason: HOSPADM

## 2021-11-04 RX ORDER — MORPHINE SULFATE 0.5 MG/ML
INJECTION, SOLUTION EPIDURAL; INTRATHECAL; INTRAVENOUS
Status: COMPLETED | OUTPATIENT
Start: 2021-11-04 | End: 2021-11-04

## 2021-11-04 RX ORDER — DIPHENHYDRAMINE HCL 25 MG
25 CAPSULE ORAL
Status: DISCONTINUED | OUTPATIENT
Start: 2021-11-04 | End: 2021-11-07 | Stop reason: HOSPADM

## 2021-11-04 RX ORDER — OXYCODONE AND ACETAMINOPHEN 5; 325 MG/1; MG/1
1 TABLET ORAL
Status: DISCONTINUED | OUTPATIENT
Start: 2021-11-04 | End: 2021-11-07 | Stop reason: HOSPADM

## 2021-11-04 RX ORDER — SODIUM CHLORIDE, SODIUM LACTATE, POTASSIUM CHLORIDE, CALCIUM CHLORIDE 600; 310; 30; 20 MG/100ML; MG/100ML; MG/100ML; MG/100ML
INJECTION, SOLUTION INTRAVENOUS
Status: DISCONTINUED | OUTPATIENT
Start: 2021-11-04 | End: 2021-11-04 | Stop reason: HOSPADM

## 2021-11-04 RX ADMIN — ONDANSETRON 4 MG: 2 INJECTION INTRAMUSCULAR; INTRAVENOUS at 18:07

## 2021-11-04 RX ADMIN — PHENYLEPHRINE HYDROCHLORIDE 25 MCG/MIN: 10 INJECTION INTRAVENOUS at 12:05

## 2021-11-04 RX ADMIN — BUPIVACAINE HYDROCHLORIDE 10 MG: 5 INJECTION, SOLUTION EPIDURAL; INTRACAUDAL; PERINEURAL at 12:05

## 2021-11-04 RX ADMIN — Medication 80 MCG: at 12:12

## 2021-11-04 RX ADMIN — KETOROLAC TROMETHAMINE 30 MG: 30 INJECTION, SOLUTION INTRAMUSCULAR; INTRAVENOUS at 16:54

## 2021-11-04 RX ADMIN — SODIUM CHLORIDE, POTASSIUM CHLORIDE, SODIUM LACTATE AND CALCIUM CHLORIDE 1000 ML: 600; 310; 30; 20 INJECTION, SOLUTION INTRAVENOUS at 10:38

## 2021-11-04 RX ADMIN — OXYTOCIN 909 ML/HR: 10 INJECTION, SOLUTION INTRAMUSCULAR; INTRAVENOUS at 12:33

## 2021-11-04 RX ADMIN — SODIUM CHLORIDE, POTASSIUM CHLORIDE, SODIUM LACTATE AND CALCIUM CHLORIDE 1000 ML: 600; 310; 30; 20 INJECTION, SOLUTION INTRAVENOUS at 13:45

## 2021-11-04 RX ADMIN — Medication 10 ML: at 16:55

## 2021-11-04 RX ADMIN — SODIUM CHLORIDE, POTASSIUM CHLORIDE, SODIUM LACTATE AND CALCIUM CHLORIDE: 600; 310; 30; 20 INJECTION, SOLUTION INTRAVENOUS at 11:57

## 2021-11-04 RX ADMIN — ONDANSETRON HYDROCHLORIDE 4 MG: 2 INJECTION, SOLUTION INTRAMUSCULAR; INTRAVENOUS at 12:00

## 2021-11-04 RX ADMIN — CEFAZOLIN SODIUM 2 G: 1 INJECTION, POWDER, FOR SOLUTION INTRAMUSCULAR; INTRAVENOUS at 11:54

## 2021-11-04 RX ADMIN — Medication 10 MG: at 13:46

## 2021-11-04 RX ADMIN — MORPHINE SULFATE 0.25 MG: 0.5 INJECTION, SOLUTION EPIDURAL; INTRATHECAL; INTRAVENOUS at 12:05

## 2021-11-04 RX ADMIN — Medication 80 MCG: at 12:06

## 2021-11-04 RX ADMIN — Medication 80 MCG: at 12:25

## 2021-11-04 NOTE — PROGRESS NOTES
11/4/2021 10:12 AM Received to LR 12 for scheduled CS for breech    1140 Dr Maricarmen Lemus at bedside. Ultrasound done- breech confirmed    1520 TRANSFER - OUT REPORT:    Verbal report given to POONAM Urbano(name) on Edith Sanchez  being transferred to [facility]unit[facility] for routine post - op       Report consisted of patients Situation, Background, Assessment and   Recommendations(SBAR). Information from the following report(s) SBAR, Kardex, Procedure Summary, Intake/Output and MAR was reviewed with the receiving nurse. Lines:   Peripheral IV 11/04/21 Anterior; Left Forearm (Active)        Opportunity for questions and clarification was provided.       Patient transported with:   Registered Nurse

## 2021-11-04 NOTE — L&D DELIVERY NOTE
Delivery Summary    Patient: Malorie Holliday MRN: [id number]  SSN: [id number]    YOB: 1985  Age: 39 y.o. Sex: female        Uncomplicated PLTCS for breech fetal presentation, see operative note for details. Information for the patient's :  Nancy Plascencia [[id number]]       Labor Events:    Labor: No    Steroids: None   Cervical Ripening Date/Time:       Cervical Ripening Type:     Antibiotics During Labor: No   Rupture Identifier:      Rupture Date/Time: 2021 12:31 PM   Rupture Type: AROM   Amniotic Fluid Volume: Moderate    Amniotic Fluid Description: Clear    Amniotic Fluid Odor:      Induction: None       Induction Date/Time:        Indications for Induction:      Augmentation: None   Augmentation Date/Time:      Indications for Augmentation:     Labor complications:          Additional complications:        Delivery Events:  Indications For Episiotomy:     Episiotomy:     Perineal Laceration(s):     Repaired:     Periurethral Laceration Location:      Repaired:     Labial Laceration Location:     Repaired:     Sulcal Laceration Location:     Repaired:     Vaginal Laceration Location:     Repaired:     Cervical Laceration Location:     Repaired:     Repair Suture:     Number of Repair Packets:     Estimated Blood Loss (ml):  ml   Quantitaive Blood Loss (ml):             Delivery Date: 2021    Delivery Time: 12:32 PM   Delivery Type: , Low Transverse     Details    Trial of Labor: No   Primary/Repeat: Primary   Priority: Routine   Indications:  Breech       Sex:  Female     Gestational Age: 36w3d  Delivery Clinician:  Gabriela Zuleta  Living Status: Living   Delivery Location: OR 2          APGARS  One minute Five minutes Ten minutes   Skin color: 1   1        Heart rate: 2   2        Grimace: 2   2        Muscle tone: 2   2        Breathin   2        Totals: 8   9          Presentation: Breech    Position:        Resuscitation Method: Suctioning-bulb; Tactile Stimulation     Meconium Stained: Terminal      Cord Information: 3 Vessels  Complications: None  Cord around:    Delayed cord clamping? Yes  Cord clamped date/time:2021 12:33 PM  Disposition of Cord Blood: Discard    Blood Gases Sent?: No    Placenta:  Date/Time: 2021 12:33 PM  Removal: Expressed      Appearance: Normal      Measurements:  Birth Weight:        Birth Length:        Head Circumference:        Chest Circumference:       Abdominal Girth: Other Providers:   ANGELICA FOSS;MAC ALVAREZ;LUCY ERICKSON;;;DANNY ALLEN;Vera OVIEDO, Obstetrician;Primary Nurse;Primary  Nurse;Nicu Nurse;Neonatologist;Anesthesiologist;Crna;Nurse Practitioner;Midwife;Nursery Nurse             Group B Strep:   Lab Results   Component Value Date/Time    Miki, External Negative 2020 12:00 AM     Information for the patient's :   Khurram [[id number]]   No results found for: ABORH, PCTABR, PCTDIG, BILI, ABORHEXT, ABORH     No results for input(s): PCO2CB, PO2CB, HCO3I, SO2I, IBD, PTEMPI, SPECTI, PHICB, ISITE, IDEV, IALLEN in the last 72 hours.

## 2021-11-04 NOTE — ANESTHESIA PROCEDURE NOTES
Spinal Block    Start time: 11/4/2021 12:00 PM  End time: 11/4/2021 12:05 PM  Performed by: Jimbo March CRNA  Authorized by: Dinesh Tello MD     Pre-procedure:   Indications: primary anesthetic  Preanesthetic Checklist: risks and benefits discussed and timeout performed    Timeout Time: 12:00 EDT          Spinal Block:   Patient Position:  Seated    Prep: Betadine      Location:  L3-4  Technique:  Single shot        Needle:   Needle Type:  Pencil-tip  Needle Gauge:  25 G  Attempts:  1      Events: CSF confirmed, no blood with aspiration and no paresthesia        Assessment:  Insertion:  Uncomplicated  Patient tolerance:  Patient tolerated the procedure well with no immediate complications

## 2021-11-04 NOTE — ANESTHESIA PREPROCEDURE EVALUATION
Anesthetic History   No history of anesthetic complications            Review of Systems / Medical History  Patient summary reviewed, nursing notes reviewed and pertinent labs reviewed    Pulmonary  Within defined limits                 Neuro/Psych   Within defined limits           Cardiovascular  Within defined limits                Exercise tolerance: >4 METS     GI/Hepatic/Renal  Within defined limits              Endo/Other  Within defined limits           Other Findings              Physical Exam    Airway  Mallampati: II    Neck ROM: normal range of motion   Mouth opening: Normal     Cardiovascular  Regular rate and rhythm,  S1 and S2 normal,  no murmur, click, rub, or gallop  Rhythm: regular  Rate: normal         Dental  No notable dental hx       Pulmonary  Breath sounds clear to auscultation               Abdominal  GI exam deferred       Other Findings            Anesthetic Plan    ASA: 2  Anesthesia type: spinal            Anesthetic plan and risks discussed with: Patient      Late entry - preop done, questions answered, and consent obtained prior to procedure; note entered after procedure at 11:49 PM

## 2021-11-04 NOTE — OP NOTES
Operative Note    Name: Marielena Elias   Medical Record Number: [id number]   YOB: 1985  Today's Date: 2021      Pre-operative Diagnosis: C/S BREECH/  [facility] 11/10/21    Post-operative Diagnosis: same but delivered    Operation: low transverse  section Procedure(s):   SECTION    Surgeon(s):  Rene Valenzuela MD    Anesthesia: Spinal    Prophylactic Antibiotics: Ancef 2g IV    DVT Prophylaxis: Sequential Compression Devices       Fetal Description: roque     Birth Information:   Information for the patient's :  Joy Fabian [[id number]]   Delivery of a   female infant on 2021 at 12:32 PM. Apgars were 8  and 9 . Umbilical Cord: 3 Vessels     Umbilical Cord Events: None     Placenta: Expressed removal with Normal appearance. Amniotic Fluid Volume: Moderate     Amniotic Fluid Description:  Clear      Placenta:  Expressed    Estimated Blood Loss (ml):  700cc    Specimens: none           Complications:  none    Procedure Detail:      After proper patient identification and consent, the patient was taken to the operating room, where the spinal anesthesia was placed and dosed to be adequate. Knowles catheter was placed. The patient was prepped and draped in the normal sterile fashion. The abdomen was entered using the Pfannenstiel technique. The peritoneum was entered sharply well superior to the bladder without any apparent injury. The bladder flap was not created due to the area of interest of the hysterotomy being well above the bladder. A low transverse uterine incision was made with the scalpel just above the vesicouterine peritoneum and extended with blunt finger dissection. The babys breech buttock was then delivered atraumatically with the help of fundal presure, followed by the body. The cord was clamped and cut and the baby was handed off to Nursing staff in attendance. Placenta was then removed from the uterus via traction on the cord.  The uterus was curettaged with a moist lap pad and cleared of all clots and debris. The uterine incision was closed with 0 vicryl, double layer in running locking fashion followed by an imbricating layer with good hemostasis assured. The uterus was replaced into the abdomen and good hemostasis of the hysterotomy was again reassured. The peritoneum was reapproximated with 2-0 vicryl in a running fashion. The fascia was closed with #1 Vicryl in a running fashion. The subcutaneous tissue was irrigated and hemostasis was achieved using the bovie. The skin was closed with a 4-0 monocryl subcuticular closure and bandaged with Aquacel dressing. The patient tolerated the procedure well. Sponge, lap, and needle counts were correct times three and the patient and baby were taken to recovery/postpartum room in stable condition.         Brigette Delaney MD  November 4, 2021  1:21 PM

## 2021-11-04 NOTE — ANESTHESIA POSTPROCEDURE EVALUATION
Procedure(s):   SECTION. spinal    Anesthesia Post Evaluation        Patient location during evaluation: PACU  Patient participation: complete - patient participated  Level of consciousness: awake and alert  Pain management: adequate  Airway patency: patent  Anesthetic complications: no  Cardiovascular status: acceptable  Respiratory status: acceptable  Hydration status: acceptable  Comments: I have seen and evaluated the patient and is ready for discharge. Concepción Sandhu MD    Post anesthesia nausea and vomiting:  none      INITIAL Post-op Vital signs:   Vitals Value Taken Time   /64 21 1457   Temp 36.5 °C (97.7 °F) 21 1335   Pulse 69 21 1457   Resp 14 21 1325   SpO2 95 % 21 1444   Vitals shown include unvalidated device data.

## 2021-11-04 NOTE — H&P
History and Physical    Patient: Jef Carolina MRN: [id number]  SSN: [id number]    YOB: 1985  Age: 39 y.o. Sex: female      Subjective:      Jef Carolina is a 39 y.o. female who presents for a scheduled  today for breech fetal presentation, non-successful ECV. Patient Active Problem List    Diagnosis    Breech presentation    Delivery of pregnancy by  section    Pregnant     PRIMARY C/S 21    Primary Provider: Teresa Joshua  [id number],  x2, TT [id number] (both delivered by Dr. Teresa Joshua)  [facility] by 9 wk     Pregnancy problems:  Pt accepts blood products in true emergency only: okay per pt to let /mother make decision if pt not able to (advanced directive in her chart scanned in 3/2020)  AMA: NIPT declined, FS with MFM done  Staph UTI: at first visit, tx macrobid done, ALEXANDRA 6/4 with enterococcus - tx with macrobid, ALEXANDRA neg    Breech: at 36 and 37 weeks, non-successful ECV - c/s scheduled for 39 wks    IOB labs: A pos, normal, staph UTI, GC/Chl neg  Genetic Screening: declines genetics  Anatomy: GIRL!!! Normal anatomy with MFM, posterior placenta  Flu:  TDAP: done   Third Tri Labs: normal  GBS: neg  Pt not COVID vaccinated, discussed     Pain mgmt. in labor: n/a  Feeding:  Social:  Priscilla Reddy born in  in [place] born in 3/2020.               Past Medical History:   Diagnosis Date    Asthma     As a child    Chlamydia     15 years ago    Heart abnormality     heart murmur    Heart murmur     As a child    Postpartum depression     after first delivery, \"mild\" , no medications    Vaginal delivery 3/18/2020     Past Surgical History:   Procedure Laterality Date    HX OTHER SURGICAL  2019    D&C      Family History   Problem Relation Age of Onset    Other Mother         high cholesterol    Hypertension Father     Diabetes Father     Other Father         CHF    Cancer Maternal Grandfather         bladder    Cancer Other         lung     Social History Tobacco Use    Smoking status: Never Smoker    Smokeless tobacco: Never Used   Substance Use Topics    Alcohol use: Not Currently     Comment: occ social      Prior to Admission medications    Medication Sig Start Date End Date Taking? Authorizing Provider   prenatal multivit-ca-min-fe-fa (Prenatal Vitamin) tab Take  by mouth. Patient not taking: Reported on 10/28/2021    Provider, Historical        Allergies   Allergen Reactions    Penicillin G Hives     childhood       Review of Systems:  A comprehensive review of systems was negative except for that written in the History of Present Illness. Objective:     Vitals:    21 1030   BP: 120/80   Pulse: 90   Resp: 16   Temp: 97.8 °F (36.6 °C)   SpO2: 97%   Weight: 201 lb (91.2 kg)   Height: 5' 8\" (1.727 m)        Physical Exam:  GENERAL: alert, cooperative, no distress, appears stated age  LUNG: clear to auscultation bilaterally  HEART: regular rate and rhythm  ABDOMEN: soft, non-tender. gravid  EXTREMITIES:  extremities normal, atraumatic, no cyanosis or edema  SKIN: Normal.  NEUROLOGIC: negative  PSYCHIATRIC: non focal    Bedside US: breech presentation confirmed    Assessment:     Hospital Problems  Date Reviewed: 10/28/2021          Codes Class Noted POA    Breech presentation ICD-10-CM: O32. 1XX0  ICD-9-CM: 652.20  2021 Unknown        Delivery of pregnancy by  section ICD-10-CM: O82  ICD-9-CM: 669.70  2021 Unknown              Plan:     Breech presentation - trial of ECV not successful, plan for c/s today  Consent signed  2 g ancef for surgical prophylaxis  SCDs for DVT ppx  COVID neg    Signed By: Gerri Washington MD     2021

## 2021-11-04 NOTE — PROGRESS NOTES
TRANSFER - IN REPORT:    Verbal report received from GEOFFREY Cisneros RN(name) on Wyatt Collet  being received from L&D(unit) for routine progression of care      Report consisted of patients Situation, Background, Assessment and   Recommendations(SBAR). Information from the following report(s) SBAR was reviewed with the receiving nurse. Opportunity for questions and clarification was provided. Assessment completed upon patients arrival to unit and care assumed. 1957: Nurse was unable to get a temperature on patient, warm blankets applied and pt was given warm tea/ Dr. Romana Moritz aware, nurse will recheck temperature in 30 minutes. 2047: Pts temp: 97.5, pt vomited and states that she feels loopy. Pt denies lightheadedness/ dizzy, SOB, chest pain and heart palpitations. Nurse called and spoke to Anesthesia, Dr. Lisseth Martinez. 5 MG of IV compazine ordered on pt.     2110: Nurse entered room and offered Compazine to pt, pt states, \"I do not want anymore medication at this time. \" Nurse encouraged pt to rest.PT will notify nurse if anything changes, or if she begins to feel worse. 2207: Pts temp: 97.5, pt states that she is feeling better.

## 2021-11-05 LAB
BASOPHILS # BLD: 0 K/UL (ref 0–0.1)
BASOPHILS NFR BLD: 0 % (ref 0–1)
DIFFERENTIAL METHOD BLD: ABNORMAL
EOSINOPHIL # BLD: 0 K/UL (ref 0–0.4)
EOSINOPHIL NFR BLD: 0 % (ref 0–7)
ERYTHROCYTE [DISTWIDTH] IN BLOOD BY AUTOMATED COUNT: 13.9 % (ref 11.5–14.5)
HCT VFR BLD AUTO: 29.4 % (ref 35–47)
HGB BLD-MCNC: 9.3 G/DL (ref 11.5–16)
IMM GRANULOCYTES # BLD AUTO: 0.1 K/UL (ref 0–0.04)
IMM GRANULOCYTES NFR BLD AUTO: 1 % (ref 0–0.5)
LYMPHOCYTES # BLD: 1.2 K/UL (ref 0.8–3.5)
LYMPHOCYTES NFR BLD: 9 % (ref 12–49)
MCH RBC QN AUTO: 29.1 PG (ref 26–34)
MCHC RBC AUTO-ENTMCNC: 31.6 G/DL (ref 30–36.5)
MCV RBC AUTO: 91.9 FL (ref 80–99)
MONOCYTES # BLD: 0.8 K/UL (ref 0–1)
MONOCYTES NFR BLD: 7 % (ref 5–13)
NEUTS SEG # BLD: 10.3 K/UL (ref 1.8–8)
NEUTS SEG NFR BLD: 83 % (ref 32–75)
NRBC # BLD: 0 K/UL (ref 0–0.01)
NRBC BLD-RTO: 0 PER 100 WBC
PLATELET # BLD AUTO: 226 K/UL (ref 150–400)
PMV BLD AUTO: 11.5 FL (ref 8.9–12.9)
RBC # BLD AUTO: 3.2 M/UL (ref 3.8–5.2)
WBC # BLD AUTO: 12.4 K/UL (ref 3.6–11)

## 2021-11-05 PROCEDURE — 74011250637 HC RX REV CODE- 250/637: Performed by: OBSTETRICS & GYNECOLOGY

## 2021-11-05 PROCEDURE — 85025 COMPLETE CBC W/AUTO DIFF WBC: CPT

## 2021-11-05 PROCEDURE — 65410000002 HC RM PRIVATE OB

## 2021-11-05 PROCEDURE — 36415 COLL VENOUS BLD VENIPUNCTURE: CPT

## 2021-11-05 RX ADMIN — IBUPROFEN 800 MG: 400 TABLET ORAL at 13:18

## 2021-11-05 RX ADMIN — IBUPROFEN 800 MG: 400 TABLET ORAL at 23:36

## 2021-11-05 NOTE — PROGRESS NOTES
Post-Operative  Day 1    Edith Sanchez     Assessment: Post-Op day 1, stable    Plan:   1. Routine post-operative care      Information for the patient's :  Darrick Olmstead [[id number]]   , Low Transverse      S:  Patient doing well without significant complaint. Pain controlled with medication, tolerating food, no flatus, chavez removed last night. Did not attempt ambulation last night. Vitals:  Visit Vitals  BP (!) 106/58 (BP 1 Location: Right upper arm, BP Patient Position: At rest)   Pulse 92   Temp 99 °F (37.2 °C)   Resp 16   Ht 5' 8\" (1.727 m)   Wt 201 lb (91.2 kg)   LMP  (LMP Unknown)   SpO2 97%   Breastfeeding Unknown   BMI 30.56 kg/m²     Temp (24hrs), Av.7 °F (36.5 °C), Min:97.3 °F (36.3 °C), Max:99 °F (37.2 °C)      Last 24hr Input/Output:    Intake/Output Summary (Last 24 hours) at 2021 0812  Last data filed at 2021 0330  Gross per 24 hour   Intake 800 ml   Output 1420 ml   Net -620 ml          Exam:        Patient without distress. Lungs clear. Abdomen, bowel sounds present, soft, expected tenderness, fundus firm Wound dressing intact     Perineum normal lochia noted               Lower extremities are negative for swelling, cords or tenderness.     Labs:   Lab Results   Component Value Date/Time    WBC 12.4 (H) 2021 05:13 AM    WBC 7.9 2021 10:39 AM    WBC 8.9 10/25/2021 10:38 AM    WBC 8.4 2021 10:56 AM    WBC 9.5 2021 02:46 PM    WBC 8.4 2020 11:16 AM    WBC 6.9 2019 10:28 AM    WBC 7.8 2019 10:40 AM    WBC 6.2 2019 01:30 AM    WBC 4.9 2018 09:45 AM    WBC 10.3 2017 11:15 PM    HGB 9.3 (L) 2021 05:13 AM    HGB 10.0 (L) 2021 10:39 AM    HGB 10.3 (L) 10/25/2021 10:38 AM    HGB 10.6 (L) 2021 10:56 AM    HGB 11.8 2021 02:46 PM    HGB 11.0 (L) 2020 11:16 AM    HGB 10.5 (L) 2019 10:28 AM    HGB 11.5 2019 10:40 AM    HGB 10.4 (L) 2019 01:30 AM    HGB 12.0 04/12/2018 09:45 AM    HGB 10.9 (L) 08/14/2017 12:28 AM    HGB 12.4 08/12/2017 11:15 PM    HCT 29.4 (L) 11/05/2021 05:13 AM    HCT 30.9 (L) 11/04/2021 10:39 AM    HCT 32.3 (L) 10/25/2021 10:38 AM    HCT 34.2 (L) 08/18/2021 10:56 AM    HCT 37.0 05/06/2021 02:46 PM    HCT 34.3 (L) 03/18/2020 11:16 AM    HCT 31.6 (L) 12/23/2019 10:28 AM    HCT 33.6 (L) 09/13/2019 10:40 AM    HCT 31.6 (L) 03/24/2019 01:30 AM    HCT 36.2 04/12/2018 09:45 AM    HCT 31.9 (L) 08/14/2017 12:28 AM    HCT 37.6 08/12/2017 11:15 PM    PLATELET 616 40/01/1838 05:13 AM    PLATELET 989 09/97/6792 10:39 AM    PLATELET 979 87/52/0750 10:38 AM    PLATELET 801 97/19/0423 10:56 AM    PLATELET 183 60/83/4888 02:46 PM    PLATELET 928 91/80/2818 11:16 AM    PLATELET 206 48/37/8604 10:28 AM    PLATELET 533 46/89/0327 10:40 AM    PLATELET 767 97/56/7973 01:30 AM    PLATELET 145 23/10/1644 09:45 AM    PLATELET 437 09/37/7337 11:15 PM    Hgb, External 10.5 12/19/2019 12:00 AM    Hgb, External 10.5 05/30/2017 12:00 AM    Hct, External 31.6 12/19/2019 12:00 AM    Hct, External 31.6 05/30/2017 12:00 AM    Platelet cnt., External 230 12/19/2019 12:00 AM    Platelet cnt., External 227 01/09/2017 12:00 AM       Recent Results (from the past 24 hour(s))   CBC W/O DIFF    Collection Time: 11/04/21 10:39 AM   Result Value Ref Range    WBC 7.9 3.6 - 11.0 K/uL    RBC 3.42 (L) 3.80 - 5.20 M/uL    HGB 10.0 (L) 11.5 - 16.0 g/dL    HCT 30.9 (L) 35.0 - 47.0 %    MCV 90.4 80.0 - 99.0 FL    MCH 29.2 26.0 - 34.0 PG    MCHC 32.4 30.0 - 36.5 g/dL    RDW 14.2 11.5 - 14.5 %    PLATELET 447 048 - 097 K/uL    MPV 11.7 8.9 - 12.9 FL    NRBC 0.0 0  WBC    ABSOLUTE NRBC 0.00 0.00 - 0.01 K/uL   TYPE & SCREEN    Collection Time: 11/04/21 10:39 AM   Result Value Ref Range    Crossmatch Expiration 11/07/2021,2359     ABO/Rh(D) A POSITIVE     Antibody screen NEG    CBC WITH AUTOMATED DIFF    Collection Time: 11/05/21  5:13 AM   Result Value Ref Range    WBC 12.4 (H) 3.6 - 11.0 K/uL RBC 3.20 (L) 3.80 - 5.20 M/uL    HGB 9.3 (L) 11.5 - 16.0 g/dL    HCT 29.4 (L) 35.0 - 47.0 %    MCV 91.9 80.0 - 99.0 FL    MCH 29.1 26.0 - 34.0 PG    MCHC 31.6 30.0 - 36.5 g/dL    RDW 13.9 11.5 - 14.5 %    PLATELET 467 061 - 743 K/uL    MPV 11.5 8.9 - 12.9 FL    NRBC 0.0 0  WBC    ABSOLUTE NRBC 0.00 0.00 - 0.01 K/uL    NEUTROPHILS 83 (H) 32 - 75 %    LYMPHOCYTES 9 (L) 12 - 49 %    MONOCYTES 7 5 - 13 %    EOSINOPHILS 0 0 - 7 %    BASOPHILS 0 0 - 1 %    IMMATURE GRANULOCYTES 1 (H) 0.0 - 0.5 %    ABS. NEUTROPHILS 10.3 (H) 1.8 - 8.0 K/UL    ABS. LYMPHOCYTES 1.2 0.8 - 3.5 K/UL    ABS. MONOCYTES 0.8 0.0 - 1.0 K/UL    ABS. EOSINOPHILS 0.0 0.0 - 0.4 K/UL    ABS. BASOPHILS 0.0 0.0 - 0.1 K/UL    ABS. IMM.  GRANS. 0.1 (H) 0.00 - 0.04 K/UL    DF AUTOMATED

## 2021-11-05 NOTE — ADT AUTH CERT NOTES
[address] 
  
FACILITY NPI :[id number] FACILITY TAX ID :  
  
. [facility] 
[facility] [address] 
[address] [address] [phone] 
  
  
UR DEPT CONTACT: 
Rashmi GrewalJd 
#[phone] GC#[phone] 
 
   
Patient Name :Jef Carolina  : 1985 (36 yrs) MRN : [id number] 
  
Patient Mailing Address [address] [place] [[address]] , [address]          
  
   
Insurance Plan Payor: BLUE CROSS / [place] / Product Type: PPO /  
  
Primary Coverage Subscriber ID : [id number] 
  
Secondary Coverage:  BLUE CROSS 
  
Secondary Subscriber ID :  [id number] 
   
Current Patient Class : INPATIENT Admit Date : 2021 
  
REQUESTED LEVEL OF CARE: INPATIENT [101] Diagnosis : Breech presentation;Delivery of pregnancy by  section 
                    
  
ICD10 Code : Breech presentation Yadiel Michelle Delivery of pregnancy by  section [O82] 
  
Current Room and Bed 337/01 
  
Admitting and Attending Info: 
Admitting Provider : Donaldo Clark MD   NPI: [id number] Admitting Provider Phone. [phone] Admitting Provider Address: [facility], [address] 
  
Attending Provider Rosa Oconnor MD   [id number] Attending Provider Address:  [place] [address] 
  
Attending Provider Phone: Amee mix phone: [phone] 
  
  
   
BABY INFORMATION : 
  
Name : 
Reyes Labrum  :  
2021 (1 dy)  
  Delivery Type :    
  
Weight in Grams :  
3175 g  
  
GA  :  
39  
  
Apgar 1 Min :  
8  [8]   Apgar 5 Min :  
9  [9]  
  
Unit: [facility] 3  NURSERY  
   
 
 
Luz Maria Cox 
  
MRN: [id number] Link to Baby 
Comment [de-identified] name MRN Account  Age Sex Admission Type  
Ninoska De La Torre [id number] 21 1 days F Confirmed Admission - L&D [place] [facility] History Ebb Ronnie 3 Para 3 Term 3   
   
 AB  
   
 Living 3 SAB  
   
 IAB  
   
 Ectopic  
   
 Molar  
   
 Multiple  
0 Live Births 3  
  
  
# Outcome Date GA Labor/2nd Weight Sex Delivery Anes PTL Lv A1 A5  
1 Term 17 39w0d  3.1 kg M Vag-Spont Epidural  N Living 8 9 Name: [facility] Location: Other Delivering Clinician: Steve Dockery MD  
  
2 Term 20 39w6d 2h 40m / 0h 32m 3.485 kg M Vag-Spont None N Living 8 9 Name: [facility] Location: Other Delivering Clinician: Chuckie Miller MD  
  
3 Term 21 39w1d  3.175 kg F CS-LTranv Spinal N Living 8 9 Name: [facility] Location: Other Delivering Clinician: Chuckie Miller MD  
  
 
Prenatal History Most Recent Value Did You Receive Prenatal Care Yes Name Of [facility] Provider love Seen By MFM (Maternal Fetal Medicine)? No  
Fetal Ultrasound Abnormalities/Concerns? No  
Infant Feeding Breast Milk Circumcision Planned No  
Pediatrician After Birth/ Follow Up Baby Visits pediatric assoc Dating Summary Working CHEYENNE: 11/10/21 based on Alternate CHEYENNE Entry Based On CHEYENNE GA Diff GA User Date Last Menstrual Period (LMP Unknown)    System action - copied 21 Alternate CHEYENNE Entry 
0 Comment: Date entered prior to episode creation 11/10/21 Working  System action - copied 21 Prenatal Results Prenatal Labs Test Value Date Time ABO/Rh * A pos  17 Antibody Scrn Hgb Hct Platelets Rubella RPR     
T. Pallidum Antibody Urine Hep B Surf Ag Hep C     
HIV Gonorrhea Chlamydia TSH     
GTT, 1 HR (Glucola) GTT, Fasting GTT, 1 HR     
GTT, 2 HR     
GTT, 3 HR     
 
3rd Trimester Test Value Date Time Hgb Hct Platelets Group B Strep Antibody Scrn     
TSH     
T. Pallidum Antibody Hep B Surf Ag Gonorrhea Chlamydia Screening/Diagnostics Test Value Date Time  
AFP Only AFP Tetra Hgb Electrophoresis Amniocentesis Cystic Fibrosis Thalessemia Channing-Sachs Katerin Hands PAP Smear Beta HCG     
NT     
NIPT     
HPENW-39 Lab Test Value Date Time GTT, Fasting GTT, 1HR     
GTT, 2HR     
GTT, 3HR     
RPR Beta HCG     
CMV Ab Toxoplasma Ab Varicella Zoster Ab Legend *: Historical 
View all results for this pregnancy Catia Lopez [[id number]] Cook Delivery Birth date/time: 2021 12:32:00 Delivery type: , Low Transverse  categorization: Primary  priority: Routine Indications for : Breech Labor Events  labor?: No 
 steroids?: None Antibiotics during labor?: No 
Rupture date/time: 2021 1231 Rupture type: AROM Fluid color: Clear Trial of labor?: No 
Induction: None Augmentation: None Delivery Providers Delivering clinician: Ten Neves MD 
Provider Role Bianca, Fernie Rosales MD Obstetrician Anne Santo, LUCRECIA Primary Nurse [facility] Primary  Nurse NICU Nurse Neonatologist  
Tami Plasencia MD Anesthesiologist  
Bhavik Clark, CRNA CRNA Nurse Practitioner Midwife Nursery Nurse Anesthesia Method: Spinal 
 
Multiple Birth Onset Second Stage No data filed Operative Delivery Forceps attempted?: No 
Vacuum extractor attempted?: No 
 
Presentation Presentation: Breech Apgars Living status: Living Apgars:  1 min. :  5 min.:  10 min. :  15 min.:  20 min.:   
Skin color:  1  1 Heart rate:  2  2 Reflex irritability:  2  2 Muscle tone:  2  2 Respiratory effort:  1  2 Total:  8  9 Apgars assigned by: Deondre Velazquez RN 
 
Resuscitation Method: Suctioning-bulb, Tactile Stimulation Shoulder Dystocia Shoulder dystocia present?: No 
 
Measurements Weight: 3175 g Weight (lbs): 7 lb Length: 45.7 cm Length (in): 18\" Head circumference: 35 cm Abdominal girth: 30.5 cm Lacerations No data filed Placenta Placenta Date/Time: 11/4/2021 1233 Removal: Expressed Appearance: Normal Placenta disposition: Discarded Vaginal Counts Skin to Skin Reason skin to skin not initiated: Patient Refused Delivery Summary History Event User Date/Time Signed Yvette Collado MD 11/4/2021 16:21:17

## 2021-11-05 NOTE — LACTATION NOTE
This note was copied from a baby's chart. Initial consult for third time mother who is independently nursing and doing well. Baby nursing well today,  deep latch obtained, mother is comfortable, baby feeding vigorously with rhythmic suck, swallow, breathe pattern, audible swallowing, and evident milk transfer, both breasts offered, baby is asleep following feeding. Mother nursed her first, pumped for her second due to latch issues, and plans to nurse this baby.

## 2021-11-05 NOTE — ROUTINE PROCESS
Bedside shift change report given to NALDO Olivo RN (oncoming nurse) by Elvis Gill RN (offgoing nurse). Report included the following information SBAR.

## 2021-11-05 NOTE — PROGRESS NOTES
0000 SBAR report received from CHANDRIKA Duran RN. Explained to patient the importance of trying to get up tonight. Pt agreed that it would be a good idea to get up at some point tonight    0050  Patient tolerating fluids. Suggested taking pain medication for when patient has to move, stated we would try to to get up within the next hour. Patient's partner aggressively declined having mom try to ambulate. When asked if patient would foresee herself trying to ambulate/sit at edge of bed patient's  said possibly not. Patient agreed that she would probably not try to ambulate on this shift. Will frequently round to see if anything should change. 130- Charge CHARLEY England spoke to patient in regards to the risks and benefits of not ambulating early. Patient understood teaching but refuses to ambulate at this time. 2025 [facility] [place] midwife called for recommendation on chavez. Patient's output has been adequate, IV fluids still running and patient is taking small sips of water. Still declining to ambulate. Orders to DC chavez. 330 Chavez removed. With pt  asleep, patient stood at the side of the bed. Made attempts to have her walk in place but she said she could not.

## 2021-11-06 PROCEDURE — 65410000002 HC RM PRIVATE OB

## 2021-11-06 PROCEDURE — 74011250637 HC RX REV CODE- 250/637: Performed by: OBSTETRICS & GYNECOLOGY

## 2021-11-06 RX ORDER — IBUPROFEN 800 MG/1
800 TABLET ORAL EVERY 8 HOURS
Qty: 30 TABLET | Refills: 1 | Status: SHIPPED | OUTPATIENT
Start: 2021-11-06 | End: 2022-01-05 | Stop reason: ALTCHOICE

## 2021-11-06 RX ORDER — OXYCODONE AND ACETAMINOPHEN 5; 325 MG/1; MG/1
1 TABLET ORAL
Qty: 12 TABLET | Refills: 0 | Status: SHIPPED | OUTPATIENT
Start: 2021-11-06 | End: 2021-11-09

## 2021-11-06 RX ADMIN — IBUPROFEN 800 MG: 400 TABLET ORAL at 09:27

## 2021-11-06 RX ADMIN — IBUPROFEN 800 MG: 400 TABLET ORAL at 17:27

## 2021-11-06 RX ADMIN — ACETAMINOPHEN 650 MG: 325 TABLET ORAL at 23:16

## 2021-11-06 RX ADMIN — DOCUSATE SODIUM 100 MG: 100 CAPSULE ORAL at 09:27

## 2021-11-06 NOTE — PROGRESS NOTES
0800 Verbal shift change report given to [facility] (oncoming nurse) by [facility] (offgoing nurse). Report included the following information SBAR, Kardex, Intake/Output, MAR, and Recent Results. Pt requesting early discharge for today. Will notify CNM.

## 2021-11-06 NOTE — DISCHARGE SUMMARY
Obstetrical Discharge Summary     Name: Judi Tomlinson MRN: [id number]  SSN: [id number]    YOB: 1985  Age: 39 y.o. Sex: female      Admit Date: 2021    Discharge Date: 2021     Admitting Physician: Viki Urena MD     Attending Physician:  Armando Pereira MD     Admission Diagnoses: Breech presentation [O32.1XX0]  Delivery of pregnancy by  section [O82]    Procedure Performed:  Procedure(s):   SECTION  Surgical   Used for misc procedures    Discharge Diagnoses:   Information for the patient's :  Heike Sena [[id number]]   Delivery of a 7 lb (3.175 kg) female infant via , Low Transverse on 2021 at 12:32 PM  by Viki Urena. Apgars were 8  and 9 . Additional Diagnoses:   Hospital Problems  Date Reviewed: 10/28/2021          Codes Class Noted POA    Breech presentation ICD-10-CM: O32. 1XX0  ICD-9-CM: 652.20  2021 Unknown        Delivery of pregnancy by  section ICD-10-CM: O82  ICD-9-CM: 669.70  2021 Unknown             Lab Results   Component Value Date/Time    Rubella, External 14.5 imm 2017 12:00 AM    GrBStrep, External Negative 2020 12:00 AM       Hospital Course: Normal hospital course following the delivery. Patient Disposition: Home      Followup Care:  Discharge Condition: stable  No lifting, Driving, or Strenuous exercise for 2 weeks  Regular Diet  Keep wound clean and dry    Patient Instructions:   Current Discharge Medication List      START taking these medications    Details   ibuprofen (MOTRIN) 800 mg tablet Take 1 Tablet by mouth every eight (8) hours. Indications: pain  Qty: 30 Tablet, Refills: 1    Associated Diagnoses: Delivery of pregnancy by  section      oxyCODONE-acetaminophen (PERCOCET) 5-325 mg per tablet Take 1 Tablet by mouth every six (6) hours as needed for Pain for up to 3 days. Max Daily Amount: 4 Tablets.   Qty: 12 Tablet, Refills: 0    Associated Diagnoses: Delivery of pregnancy by  section         CONTINUE these medications which have NOT CHANGED    Details   prenatal multivit-ca-min-fe-fa (Prenatal Vitamin) tab Take  by mouth. Reference my discharge instructions.     Follow-up Appointments   Procedures    FOLLOW UP VISIT Appointment in: 6 Weeks     Standing Status:   Standing     Number of Occurrences:   1     Order Specific Question:   Appointment in     Answer:   6 Weeks        Signed By:  Anya Maravilla NP     2021

## 2021-11-06 NOTE — PROGRESS NOTES
Post-Operative  Day 2    Edith Sanchez     Assessment: Post-Op day 2, doing well  Desires early discharge home yes no: yes    Plan:   1. Routine post-operative care    Information for the patient's :  Yen Waters [[id number]]   , Low Transverse        Patient doing well without significant complaint  Ambulating and voiding without difficulty   Nausea and vomiting resolved  Tolerating PO and PO meds well  Passing flatus  BM yes no: no  Breastfeeding well established      Vitals:  Visit Vitals  /62 (BP 1 Location: Left upper arm, BP Patient Position: At rest)   Pulse 88   Temp 97.8 °F (36.6 °C)   Resp 16   Ht 5' 8\" (1.727 m)   Wt 201 lb (91.2 kg)   LMP  (LMP Unknown)   SpO2 98%   Breastfeeding Unknown   BMI 30.56 kg/m²     Temp (24hrs), Av.4 °F (36.9 °C), Min:97.8 °F (36.6 °C), Max:98.8 °F (37.1 °C)        Exam:    A,A&Ox3      Patient without distress  Breasts soft, NT  Abdomen soft expected tenderness  FF scant Lochia Rubra  Wound incision clean, dry and intact  Lower extremities are negative for swelling, cords or tenderness.     Labs:   Lab Results   Component Value Date/Time    WBC 12.4 (H) 2021 05:13 AM    WBC 7.9 2021 10:39 AM    WBC 8.9 10/25/2021 10:38 AM    WBC 8.4 2021 10:56 AM    WBC 9.5 2021 02:46 PM    WBC 8.4 2020 11:16 AM    WBC 6.9 2019 10:28 AM    WBC 7.8 2019 10:40 AM    WBC 6.2 2019 01:30 AM    WBC 4.9 2018 09:45 AM    WBC 10.3 2017 11:15 PM    HGB 9.3 (L) 2021 05:13 AM    HGB 10.0 (L) 2021 10:39 AM    HGB 10.3 (L) 10/25/2021 10:38 AM    HGB 10.6 (L) 2021 10:56 AM    HGB 11.8 2021 02:46 PM    HGB 11.0 (L) 2020 11:16 AM    HGB 10.5 (L) 2019 10:28 AM    HGB 11.5 2019 10:40 AM    HGB 10.4 (L) 2019 01:30 AM    HGB 12.0 2018 09:45 AM    HGB 10.9 (L) 2017 12:28 AM    HGB 12.4 2017 11:15 PM    HCT 29.4 (L) 2021 05:13 AM    HCT 30.9 (L) 11/04/2021 10:39 AM    HCT 32.3 (L) 10/25/2021 10:38 AM    HCT 34.2 (L) 08/18/2021 10:56 AM    HCT 37.0 05/06/2021 02:46 PM    HCT 34.3 (L) 03/18/2020 11:16 AM    HCT 31.6 (L) 12/23/2019 10:28 AM    HCT 33.6 (L) 09/13/2019 10:40 AM    HCT 31.6 (L) 03/24/2019 01:30 AM    HCT 36.2 04/12/2018 09:45 AM    HCT 31.9 (L) 08/14/2017 12:28 AM    HCT 37.6 08/12/2017 11:15 PM    PLATELET 588 65/29/7500 05:13 AM    PLATELET 303 00/22/4391 10:39 AM    PLATELET 668 26/31/8433 10:38 AM    PLATELET 891 24/23/6123 10:56 AM    PLATELET 591 91/24/9855 02:46 PM    PLATELET 712 29/60/1403 11:16 AM    PLATELET 897 67/52/3194 10:28 AM    PLATELET 320 76/34/4667 10:40 AM    PLATELET 685 63/38/8231 01:30 AM    PLATELET 591 30/91/7314 09:45 AM    PLATELET 587 78/02/4615 11:15 PM    Hgb, External 10.5 12/19/2019 12:00 AM    Hgb, External 10.5 05/30/2017 12:00 AM    Hct, External 31.6 12/19/2019 12:00 AM    Hct, External 31.6 05/30/2017 12:00 AM    Platelet cnt., External 230 12/19/2019 12:00 AM    Platelet cnt., External 227 01/09/2017 12:00 AM       No results found for this or any previous visit (from the past 24 hour(s)). A/P:  Routine Post-Op Care  Ambulate  Lactation consult prn  Discharge home today if baby can go    Amy Sidney.  [employer], FLO/EBONIE

## 2021-11-06 NOTE — LACTATION NOTE
This note was copied from a baby's chart. Mom hoping for discharge with baby this afternoon. Mom states baby has been latching but she is feeling pain when she nurses. She has noticed some abrasions on her nipples. I talked to mom about getting the baby positioned close to her and then getting her latched when her mouth is open wide. I helped mom get the baby latched in the positioned next to her in the cross cradle hold. Baby latched and mom said it felt better. I encouraged her to feed the baby when she is showing feeding cues. She should not limit the time the baby is at the breast and she will offer both breasts at each feeding. Baby's weight loss this morning is 9.9%. Weight loss yesterday was 6.7%.

## 2021-11-07 VITALS
BODY MASS INDEX: 30.46 KG/M2 | HEART RATE: 71 BPM | WEIGHT: 201 LBS | HEIGHT: 68 IN | DIASTOLIC BLOOD PRESSURE: 76 MMHG | TEMPERATURE: 98 F | OXYGEN SATURATION: 99 % | RESPIRATION RATE: 20 BRPM | SYSTOLIC BLOOD PRESSURE: 113 MMHG

## 2021-11-07 PROCEDURE — 74011250637 HC RX REV CODE- 250/637: Performed by: OBSTETRICS & GYNECOLOGY

## 2021-11-07 RX ADMIN — IBUPROFEN 800 MG: 400 TABLET ORAL at 01:31

## 2021-11-07 RX ADMIN — IBUPROFEN 800 MG: 400 TABLET ORAL at 10:12

## 2021-11-07 RX ADMIN — DOCUSATE SODIUM 100 MG: 100 CAPSULE ORAL at 10:12

## 2021-11-07 NOTE — ROUTINE PROCESS
2000- Bedside shift change report given to FAHEEM Katz RN (oncoming nurse) by Harriett Messina RN (offgoing nurse). Report included the following information SBAR.

## 2021-11-07 NOTE — ROUTINE PROCESS
Verbal shift change report given to JUAN CARLOS Marion RN (oncoming nurse) by Teodora Kamara RN (offgoing nurse). Report included the following information SBAR, Intake/Output, MAR and Recent Results. 1030- Pt declined flu vaccine.

## 2021-11-07 NOTE — DISCHARGE INSTRUCTIONS
Patient Education        After Your Delivery (the Postpartum Period): Care Instructions  Your Care Instructions     Congratulations on the birth of your baby. Like pregnancy, the  period can be a time of excitement, inder, and exhaustion. You may look at your wondrous little baby and feel happy. You may also be overwhelmed by your new sleep hours and new responsibilities. At first, babies often sleep during the days and are awake at night. They do not have a pattern or routine. They may make sudden gasps, jerk themselves awake, or look like they have crossed eyes. These are all normal, and they may even make you smile. In these first weeks after delivery, try to take good care of yourself. It may take 4 to 6 weeks to feel like yourself again, and possibly longer if you had a  birth. You will likely feel very tired for several weeks. Your days will be full of ups and downs, but lots of inder as well. Follow-up care is a key part of your treatment and safety. Be sure to make and go to all appointments, and call your doctor if you are having problems. It's also a good idea to know your test results and keep a list of the medicines you take. How can you care for yourself at home? Take care of your body after delivery  · Use pads instead of tampons for the bloody flow that may last as long as 2 weeks. · Ease cramps with ibuprofen (Advil, Motrin). · Ease soreness of hemorrhoids and the area between your vagina and rectum with ice compresses or witch hazel pads. · Ease constipation by drinking lots of fluid and eating high-fiber foods. Ask your doctor about over-the-counter stool softeners. · Cleanse yourself with a gentle squeeze of warm water from a bottle instead of wiping with toilet paper. · Take a sitz bath in warm water several times a day. · Wear a good nursing bra. Ease sore and swollen breasts with warm, wet washcloths.   · If you aren't breastfeeding, use ice rather than heat for breast soreness. · Your period may not start for several months if you are breastfeeding. You may bleed more, and longer at first, than you did before you got pregnant. · Wait until you are healed (about 4 to 6 weeks) before you have sex. Ask your doctor when it is okay for you to have sex. · Try not to travel with your baby for 5 or 6 weeks. If you take a long car trip, make frequent stops to walk around and stretch. Avoid exhaustion  · Rest every day. Try to nap when your baby naps. · Ask another adult to be with you for a few days after delivery. · Plan for  if you have other children. · Stay flexible so you can eat at odd hours and sleep when you need to. Both you and your baby are making new schedules. · Plan small trips to get out of the house. Change can make you feel less tired. · Ask for help with housework, cooking, and shopping. Remind yourself that your job is to care for your baby. Know about help for postpartum depression  · \"Baby blues\" are common for the first 1 to 2 weeks after birth. You may cry or feel sad or irritable for no reason. · Rest whenever you can. Being tired makes it harder to handle your emotions. · Go for walks with your baby. · Talk to your partner, friends, and family about your feelings. · If your symptoms last for more than a few weeks, or if you feel very depressed, ask your doctor for help. · Postpartum depression can be treated. Support groups and counseling can help. Sometimes medicine can also help. Stay healthy  · Eat healthy foods so you have more energy. · If you breastfeed, avoid drugs. If you quit smoking during pregnancy, try to stay smoke-free. If you choose to have a drink now and then, have only one drink, and limit the number of occasions that you have a drink. Wait to breastfeed at least 2 hours after you have a drink to reduce the amount of alcohol the baby may get in the milk.   · Start daily exercise after 4 to 6 weeks, but rest when you feel tired.  · Learn exercises to tone your belly. Do Kegel exercises to regain strength in your pelvic muscles. You can do these exercises while you stand or sit. ? Squeeze the same muscles you would use to stop your urine. Your belly and thighs should not move. ? Hold the squeeze for 3 seconds, and then relax for 3 seconds. ? Start with 3 seconds. Then add 1 second each week until you are able to squeeze for 10 seconds. ? Repeat the exercise 10 to 15 times for each session. Do three or more sessions each day. · Find a class for you and your baby that has an exercise time. · If you had a  birth, give yourself a bit more time before you exercise, and be careful. When should you call for help? Call 911  anytime you think you may need emergency care. For example, call if:    · You have thoughts of harming yourself, your baby, or another person.     · You passed out (lost consciousness).     · You have chest pain, are short of breath, or cough up blood.     · You have a seizure. Call your doctor now or seek immediate medical care if:    · You have severe vaginal bleeding. This means you are passing blood clots and soaking through a pad each hour for 2 or more hours.     · You are dizzy or lightheaded, or you feel like you may faint.     · You have a fever.     · You have new or more belly pain.     · You have signs of a blood clot in your leg (called a deep vein thrombosis), such as:  ? Pain in the calf, back of the knee, thigh, or groin. ? Redness and swelling in your leg or groin.     · You have signs of preeclampsia, such as:  ? Sudden swelling of your face, hands, or feet. ? New vision problems (such as dimness, blurring, or seeing spots). ? A severe headache.    Watch closely for changes in your health, and be sure to contact your doctor if:    · Your vaginal bleeding seems to be getting heavier.     · You have new or worse vaginal discharge.     · You feel sad, anxious, or hopeless for more than a few days.     · You do not get better as expected. Where can you learn more? Go to http://www.[employer].com/  Enter A461 in the search box to learn more about \"After Your Delivery (the Postpartum Period): Care Instructions. \"  Current as of: 2021               Content Version: 13.0   [employer]. Care instructions adapted under license by [employer] (which disclaims liability or warranty for this information). If you have questions about a medical condition or this instruction, always ask your healthcare professional. [place] [address] any warranty or liability for your use of this information. Patient Education         Section: What to Expect at Home  Your Recovery     A  section, or , is surgery to deliver your baby through a cut that the doctor makes in your lower belly and uterus. The cut is called an incision. You may have some pain in your lower belly and need pain medicine for 1 to 2 weeks. You can expect some vaginal bleeding for several weeks. You will probably need about 6 weeks to fully recover. It's important to take it easy while the incision heals. Avoid heavy lifting, strenuous activities, and exercises that strain the belly muscles while you recover. Ask a family member or friend for help with housework, cooking, and shopping. This care sheet gives you a general idea about how long it will take for you to recover. But each person recovers at a different pace. Follow the steps below to get better as quickly as possible. How can you care for yourself at home? Activity    · Rest when you feel tired. Getting enough sleep will help you recover.     · Try to walk each day. Start by walking a little more than you did the day before. Bit by bit, increase the amount you walk.  Walking boosts blood flow and helps prevent pneumonia, constipation, and blood clots.     · Avoid strenuous activities, such as bicycle riding, jogging, weightlifting, and aerobic exercise, for 6 weeks or until your doctor says it is okay.     · Until your doctor says it is okay, do not lift anything heavier than your baby.     · Do not do sit-ups or other exercises that strain the belly muscles for 6 weeks or until your doctor says it is okay.     · Hold a pillow over your incision when you cough or take deep breaths. This will support your belly and decrease your pain.     · You may shower as usual. Pat the incision dry when you are done.     · You will have some vaginal bleeding. Wear sanitary pads. Do not douche or use tampons until your doctor says it is okay.     · Ask your doctor when you can drive again.     · You will probably need to take at least 6 weeks off work. It depends on the type of work you do and how you feel.     · Ask your doctor when it is okay for you to have sex. Diet    · You can eat your normal diet. If your stomach is upset, try bland, low-fat foods like plain rice, broiled chicken, toast, and yogurt.     · Drink plenty of fluids (unless your doctor tells you not to).     · You may notice that your bowel movements are not regular right after your surgery. This is common. Try to avoid constipation and straining with bowel movements. You may want to take a fiber supplement every day. If you have not had a bowel movement after a couple of days, ask your doctor about taking a mild laxative.     · If you are breastfeeding, limit alcohol. Alcohol can cause a lack of energy and other health problems for the baby when a breastfeeding woman drinks heavily. It can also get in the way of a mom's ability to feed her baby or to care for the child in other ways. There isn't a lot of research about exactly how much alcohol can harm a baby. Having no alcohol is the safest choice for your baby.  If you choose to have a drink now and then, have only one drink, and limit the number of occasions that you have a drink. Wait to breastfeed at least 2 hours after you have a drink to reduce the amount of alcohol the baby may get in the milk. Medicines    · Your doctor will tell you if and when you can restart your medicines. He or she will also give you instructions about taking any new medicines.     · If you take aspirin or some other blood thinner, ask your doctor if and when to start taking it again. Make sure that you understand exactly what your doctor wants you to do.     · Take pain medicines exactly as directed. ? If the doctor gave you a prescription medicine for pain, take it as prescribed. ? If you are not taking a prescription pain medicine, ask your doctor if you can take an over-the-counter medicine.     · If you think your pain medicine is making you sick to your stomach:  ? Take your medicine after meals (unless your doctor has told you not to). ? Ask your doctor for a different pain medicine.     · If your doctor prescribed antibiotics, take them as directed. Do not stop taking them just because you feel better. You need to take the full course of antibiotics. Incision care    · If you have strips of tape on the incision, leave the tape on for a week or until it falls off.     · Wash the area daily with warm, soapy water, and pat it dry. Don't use hydrogen peroxide or alcohol, which can slow healing. You may cover the area with a gauze bandage if it weeps or rubs against clothing. Change the bandage every day.     · Keep the area clean and dry. Other instructions    · If you breastfeed your baby, you may be more comfortable while you are healing if you place the baby so that he or she is not resting on your belly. Try tucking your baby under your arm, with his or her body along the side you will be feeding on. Support your baby's upper body with your arm. With that hand you can control your baby's head to bring his or her mouth to your breast. This is sometimes called the football hold.    Follow-up care is a key part of your treatment and safety. Be sure to make and go to all appointments, and call your doctor if you are having problems. It's also a good idea to know your test results and keep a list of the medicines you take. When should you call for help? Call 911  anytime you think you may need emergency care. For example, call if:    · You have thoughts of harming yourself, your baby, or another person.     · You passed out (lost consciousness).     · You have chest pain, are short of breath, or cough up blood.     · You have a seizure. Call your doctor now or seek immediate medical care if:    · You have pain that does not get better after you take pain medicine.     · You have severe vaginal bleeding.     · You are dizzy or lightheaded, or you feel like you may faint.     · You have new or worse pain in your belly or pelvis.     · You have loose stitches, or your incision comes open.     · You have symptoms of infection, such as:  ? Increased pain, swelling, warmth, or redness. ? Red streaks leading from the incision. ? Pus draining from the incision. ? A fever.     · You have symptoms of a blood clot in your leg (called a deep vein thrombosis), such as:  ? Pain in your calf, back of the knee, thigh, or groin. ? Redness and swelling in your leg or groin.     · You have signs of preeclampsia, such as:  ? Sudden swelling of your face, hands, or feet. ? New vision problems (such as dimness, blurring, or seeing spots). ? A severe headache. Watch closely for changes in your health, and be sure to contact your doctor if:    · You do not get better as expected. Where can you learn more? Go to http://www.[employer].com/  Enter M806 in the search box to learn more about \" Section: What to Expect at Home. \"  Current as of: 2021               Content Version: 13.0  © 2774-6349 Healthwise, Incorporated.    Care instructions adapted under license by [facility] (which disclaims liability or warranty for this information). If you have questions about a medical condition or this instruction, always ask your healthcare professional. [address] any warranty or liability for your use of this information. Patient Education         Section: What to Expect at Home  Your Recovery     A  section, or , is surgery to deliver your baby through a cut that the doctor makes in your lower belly and uterus. The cut is called an incision. You may have some pain in your lower belly and need pain medicine for 1 to 2 weeks. You can expect some vaginal bleeding for several weeks. You will probably need about 6 weeks to fully recover. It's important to take it easy while the incision heals. Avoid heavy lifting, strenuous activities, and exercises that strain the belly muscles while you recover. Ask a family member or friend for help with housework, cooking, and shopping. This care sheet gives you a general idea about how long it will take for you to recover. But each person recovers at a different pace. Follow the steps below to get better as quickly as possible. How can you care for yourself at home? Activity    · Rest when you feel tired. Getting enough sleep will help you recover.     · Try to walk each day. Start by walking a little more than you did the day before. Bit by bit, increase the amount you walk.  Walking boosts blood flow and helps prevent pneumonia, constipation, and blood clots.     · Avoid strenuous activities, such as bicycle riding, jogging, weightlifting, and aerobic exercise, for 6 weeks or until your doctor says it is okay.     · Until your doctor says it is okay, do not lift anything heavier than your baby.     · Do not do sit-ups or other exercises that strain the belly muscles for 6 weeks or until your doctor says it is okay.     · Hold a pillow over your incision when you cough or take deep breaths. This will support your belly and decrease your pain.     · You may shower as usual. Pat the incision dry when you are done.     · You will have some vaginal bleeding. Wear sanitary pads. Do not douche or use tampons until your doctor says it is okay.     · Ask your doctor when you can drive again.     · You will probably need to take at least 6 weeks off work. It depends on the type of work you do and how you feel.     · Ask your doctor when it is okay for you to have sex. Diet    · You can eat your normal diet. If your stomach is upset, try bland, low-fat foods like plain rice, broiled chicken, toast, and yogurt.     · Drink plenty of fluids (unless your doctor tells you not to).     · You may notice that your bowel movements are not regular right after your surgery. This is common. Try to avoid constipation and straining with bowel movements. You may want to take a fiber supplement every day. If you have not had a bowel movement after a couple of days, ask your doctor about taking a mild laxative.     · If you are breastfeeding, limit alcohol. Alcohol can cause a lack of energy and other health problems for the baby when a breastfeeding woman drinks heavily. It can also get in the way of a mom's ability to feed her baby or to care for the child in other ways. There isn't a lot of research about exactly how much alcohol can harm a baby. Having no alcohol is the safest choice for your baby. If you choose to have a drink now and then, have only one drink, and limit the number of occasions that you have a drink. Wait to breastfeed at least 2 hours after you have a drink to reduce the amount of alcohol the baby may get in the milk. Medicines    · Your doctor will tell you if and when you can restart your medicines. He or she will also give you instructions about taking any new medicines.     · If you take aspirin or some other blood thinner, ask your doctor if and when to start taking it again.  Make sure that you understand exactly what your doctor wants you to do.     · Take pain medicines exactly as directed. ? If the doctor gave you a prescription medicine for pain, take it as prescribed. ? If you are not taking a prescription pain medicine, ask your doctor if you can take an over-the-counter medicine.     · If you think your pain medicine is making you sick to your stomach:  ? Take your medicine after meals (unless your doctor has told you not to). ? Ask your doctor for a different pain medicine.     · If your doctor prescribed antibiotics, take them as directed. Do not stop taking them just because you feel better. You need to take the full course of antibiotics. Incision care    · If you have strips of tape on the incision, leave the tape on for a week or until it falls off.     · Wash the area daily with warm, soapy water, and pat it dry. Don't use hydrogen peroxide or alcohol, which can slow healing. You may cover the area with a gauze bandage if it weeps or rubs against clothing. Change the bandage every day.     · Keep the area clean and dry. Other instructions    · If you breastfeed your baby, you may be more comfortable while you are healing if you place the baby so that he or she is not resting on your belly. Try tucking your baby under your arm, with his or her body along the side you will be feeding on. Support your baby's upper body with your arm. With that hand you can control your baby's head to bring his or her mouth to your breast. This is sometimes called the football hold. Follow-up care is a key part of your treatment and safety. Be sure to make and go to all appointments, and call your doctor if you are having problems. It's also a good idea to know your test results and keep a list of the medicines you take. When should you call for help? Call 911  anytime you think you may need emergency care.  For example, call if:    · You have thoughts of harming yourself, your baby, or another person.     · You passed out (lost consciousness).     · You have chest pain, are short of breath, or cough up blood.     · You have a seizure. Call your doctor now or seek immediate medical care if:    · You have pain that does not get better after you take pain medicine.     · You have severe vaginal bleeding.     · You are dizzy or lightheaded, or you feel like you may faint.     · You have new or worse pain in your belly or pelvis.     · You have loose stitches, or your incision comes open.     · You have symptoms of infection, such as:  ? Increased pain, swelling, warmth, or redness. ? Red streaks leading from the incision. ? Pus draining from the incision. ? A fever.     · You have symptoms of a blood clot in your leg (called a deep vein thrombosis), such as:  ? Pain in your calf, back of the knee, thigh, or groin. ? Redness and swelling in your leg or groin.     · You have signs of preeclampsia, such as:  ? Sudden swelling of your face, hands, or feet. ? New vision problems (such as dimness, blurring, or seeing spots). ? A severe headache. Watch closely for changes in your health, and be sure to contact your doctor if:    · You do not get better as expected. Where can you learn more? Go to http://www.[employer].com/  Enter M806 in the search box to learn more about \" Section: What to Expect at Home. \"  Current as of: 2021               Content Version: 13.0   Healthwise, Incorporated. Care instructions adapted under license by [employer] (which disclaims liability or warranty for this information). If you have questions about a medical condition or this instruction, always ask your healthcare professional. [place] [address] any warranty or liability for your use of this information.        We know that all of us are dealing with a tremendous amount of uncertainty, confusion and disruption to our daily lives, which may result in increased anxiety, depression and fear. If you are feeling unsettled or worse, please know that we are here to help. During this time of increased caution and care for one another, [facility] [place] ([address] [place] [address]) is offering virtual support groups to ALL MOTHERS in [place] regardless of the age of your child/children as a way to help weather this emotional storm together. Social support is an important part of self-care during this time of physical distancing. Virtual postpartum support group meetings available at www. postpartumva.org  Warm Line: 404.306.2595    Breastfeeding Support Groups (virtual)  1st and 3rd Wednesday of each month  2nd and 4th Tuesday of each month    Bode at www.[employer] under the \"About Us\" and \"Classes and Events tabs\"

## 2021-11-07 NOTE — LACTATION NOTE
This note was copied from a baby's chart. Mom states she has some nipple damage and is not able to put the baby to the breast. Baby was struggling to moms entire nipple in her mouth and her weight loss increased to 10% yesterday afternoon. She began supplementing with expressed breast milk during the night and now the weight loss is 9.1%. Mom will continue to pump and give the baby breast milk. She will attempt to put baby back to the breast as baby grows and her mouth gets bigger. Breast feeding teaching completed and all questions answered.

## 2021-11-07 NOTE — PROGRESS NOTES
Post-Operative  Day 3    Edith Sanchez       Assessment: Post-Op day 3, doing well    Plan:   1. Discharge home today  2. Follow up in office in 6 weeks with Josiane Valenzuela MD  3. Post partum activity/wound care advised, diet as tolerated  4. Discharge Medications: ibuprofen, percocet and medications prior to admission      Information for the patient's :  Fauzia You [[id number]]   , Low Transverse      Patient doing well without significant complaint  Ambulating and Voiding without difficulty  Tolerating PO and PO Meds well  Passing flatus  Breast feeding without difficulty    Vitals:  Visit Vitals  /76 (BP 1 Location: Left upper arm, BP Patient Position: At rest)   Pulse 71   Temp 98 °F (36.7 °C)   Resp 20   Ht 5' 8\" (1.727 m)   Wt 201 lb (91.2 kg)   LMP  (LMP Unknown)   SpO2 99%   Breastfeeding Unknown   BMI 30.56 kg/m²     Temp (24hrs), Av.1 °F (36.7 °C), Min:97.9 °F (36.6 °C), Max:98.4 °F (36.9 °C)        Exam:    A,A&OX3      Patient without distress. Breasts soft NT               Abdomen soft, expected tenderness  FF@ U-1  Wound incision clean, dry and intact  Lower extremities are negative for swelling, cords or tenderness.     Labs:   Lab Results   Component Value Date/Time    WBC 12.4 (H) 2021 05:13 AM    WBC 7.9 2021 10:39 AM    WBC 8.9 10/25/2021 10:38 AM    WBC 8.4 2021 10:56 AM    WBC 9.5 2021 02:46 PM    WBC 8.4 2020 11:16 AM    WBC 6.9 2019 10:28 AM    WBC 7.8 2019 10:40 AM    WBC 6.2 2019 01:30 AM    WBC 4.9 2018 09:45 AM    WBC 10.3 2017 11:15 PM    HGB 9.3 (L) 2021 05:13 AM    HGB 10.0 (L) 2021 10:39 AM    HGB 10.3 (L) 10/25/2021 10:38 AM    HGB 10.6 (L) 2021 10:56 AM    HGB 11.8 2021 02:46 PM    HGB 11.0 (L) 2020 11:16 AM    HGB 10.5 (L) 2019 10:28 AM    HGB 11.5 2019 10:40 AM    HGB 10.4 (L) 2019 01:30 AM    HGB 12.0 2018 09:45 AM    HGB 10.9 (L) 08/14/2017 12:28 AM    HGB 12.4 08/12/2017 11:15 PM    HCT 29.4 (L) 11/05/2021 05:13 AM    HCT 30.9 (L) 11/04/2021 10:39 AM    HCT 32.3 (L) 10/25/2021 10:38 AM    HCT 34.2 (L) 08/18/2021 10:56 AM    HCT 37.0 05/06/2021 02:46 PM    HCT 34.3 (L) 03/18/2020 11:16 AM    HCT 31.6 (L) 12/23/2019 10:28 AM    HCT 33.6 (L) 09/13/2019 10:40 AM    HCT 31.6 (L) 03/24/2019 01:30 AM    HCT 36.2 04/12/2018 09:45 AM    HCT 31.9 (L) 08/14/2017 12:28 AM    HCT 37.6 08/12/2017 11:15 PM    PLATELET 537 54/82/9543 05:13 AM    PLATELET 957 57/17/9791 10:39 AM    PLATELET 272 81/66/4933 10:38 AM    PLATELET 171 56/39/0803 10:56 AM    PLATELET 211 22/16/2184 02:46 PM    PLATELET 026 03/08/5533 11:16 AM    PLATELET 132 29/43/2265 10:28 AM    PLATELET 677 83/68/2035 10:40 AM    PLATELET 856 82/32/1003 01:30 AM    PLATELET 727 77/92/9608 09:45 AM    PLATELET 555 61/64/8731 11:15 PM    Hgb, External 10.5 12/19/2019 12:00 AM    Hgb, External 10.5 05/30/2017 12:00 AM    Hct, External 31.6 12/19/2019 12:00 AM    Hct, External 31.6 05/30/2017 12:00 AM    Platelet cnt., External 230 12/19/2019 12:00 AM    Platelet cnt., External 227 01/09/2017 12:00 AM       No results found for this or any previous visit (from the past 24 hour(s)). A/P:   Discharge home  RTO 6 wks sooner prn    FLO Browning/EBONIE

## 2021-12-09 NOTE — PROGRESS NOTES
Jay Hagen is a 29 y.o. female    Chief Complaint   Patient presents with    Cyst     3B//Cyst developed on left wrist a while ago and it usually leaves but has stayed on wrist since 2019    Labs     Patient took urine pregnancy test at home and it resulted positive. She is requesting blood urine test today     1. Have you been to the ER, urgent care clinic since your last visit? Hospitalized since your last visit? Yes When: 3/2019 Where: [facility] Reason for visit: Miscarriage    2. Have you seen or consulted any other health care providers outside of the [address] [place] [address] since your last visit? Include any pap smears or colon screening.  No     Health Maintenance Due   Topic Date Due    DTaP/Tdap/Td series (1 - Tdap) 04/14/2006    PAP AKA CERVICAL CYTOLOGY  04/14/2006    Influenza Age 9 to Adult  08/01/2019       Visit Vitals  /62 (BP 1 Location: Right arm, BP Patient Position: Sitting)   Pulse 82   Temp 98.8 °F (37.1 °C) (Oral)   Resp 18   Ht 5' 8\" (1.727 m)   Wt 151 lb 9.6 oz (68.8 kg)   SpO2 98%   BMI 23.05 kg/m² Yes

## 2022-01-05 ENCOUNTER — OFFICE VISIT (OUTPATIENT)
Dept: OBGYN CLINIC | Age: 37
End: 2022-01-05
Payer: COMMERCIAL

## 2022-01-05 VITALS
WEIGHT: 172 LBS | DIASTOLIC BLOOD PRESSURE: 80 MMHG | BODY MASS INDEX: 26.07 KG/M2 | SYSTOLIC BLOOD PRESSURE: 140 MMHG | HEIGHT: 68 IN

## 2022-01-05 PROCEDURE — 0503F POSTPARTUM CARE VISIT: CPT | Performed by: ADVANCED PRACTICE MIDWIFE

## 2022-01-05 NOTE — PROGRESS NOTES
Postpartum evaluation    Jeffry Guerrero is a 39 y.o. female who presents for a postpartum exam.     She is now six weeks post primary  section (breech)    Her baby is doing well. She has had no menses since delivery. She has had the following significant problems since her delivery: none    The patient is breast feeding without difficulty. The patient would like to convince spouse to get a vasectomy for birth control, but not sure if he will. She is currently taking: no medications. Her last Pap was in 2017     She is due for her next AE in 12 months.      Visit Vitals  BP (!) 140/80   Ht 5' 8\" (1.727 m)   Wt 172 lb (78 kg)   Breastfeeding Yes   BMI 26.15 kg/m²       PHYSICAL EXAMINATION    Constitutional  · Appearance: well-nourished, well developed, alert, in no acute distress    HENT  · Head and Face: appears normal    Neck  · Inspection/Palpation: normal appearance, no masses or tenderness  · Lymph Nodes: no lymphadenopathy present  · Thyroid: gland size normal, nontender, no nodules or masses present on palpation    Breasts  ·     Gastrointestinal  · Abdominal Examination: abdomen non-tender to palpation, normal bowel sounds, no masses present  · Liver and spleen: no hepatomegaly present, spleen not palpable  · Hernias: no hernias identified    Genitourinary  · External Genitalia: normal appearance for age, no discharge present, no tenderness present, no inflammatory lesions present, no masses present, no atrophy present  · Vagina: normal vaginal vault without central or paravaginal defects, no discharge present, no inflammatory lesions present, no masses present  · Bladder: non-tender to palpation  · Urethra: appears normal  · Cervix: normal   · Uterus: normal size, shape and consistency  · Adnexa: no adnexal tenderness present, no adnexal masses present  · Perineum: perineum within normal limits, no evidence of trauma, no rashes or skin lesions present  · Anus: anus within normal limits, no hemorrhoids present  · Inguinal Lymph Nodes: no lymphadenopathy present    Skin  · General Inspection: no rash, no lesions identified    Neurologic/Psychiatric  · Mental Status:  · Orientation: grossly oriented to person, place and time  · Mood and Affect: mood normal, affect appropriate    Assessment:  Normal postpartum check  EPDS 0    Plan:  Pap collected today  Rx for contraception: declined today, discussed options for breastfeeding and given brochure for Kyleena/Mirena  Also reviewed Caya and Slynd as options  Pt will message in if desires any of these    FLO Low/EBONIE

## 2022-01-08 LAB
CYTOLOGIST CVX/VAG CYTO: NORMAL
CYTOLOGY CVX/VAG DOC CYTO: NORMAL
CYTOLOGY CVX/VAG DOC THIN PREP: NORMAL
DX ICD CODE: NORMAL
HPV I/H RISK 4 DNA CVX QL PROBE+SIG AMP: NEGATIVE
Lab: NORMAL
OTHER STN SPEC: NORMAL
STAT OF ADQ CVX/VAG CYTO-IMP: NORMAL

## 2022-03-18 PROBLEM — Z34.90 PREGNANT: Status: ACTIVE | Noted: 2021-04-08

## 2024-01-02 ENCOUNTER — ROUTINE PRENATAL (OUTPATIENT)
Age: 39
End: 2024-01-02

## 2024-01-02 VITALS
WEIGHT: 169.6 LBS | DIASTOLIC BLOOD PRESSURE: 80 MMHG | BODY MASS INDEX: 25.7 KG/M2 | HEIGHT: 68 IN | SYSTOLIC BLOOD PRESSURE: 128 MMHG

## 2024-01-02 DIAGNOSIS — Z3A.08 8 WEEKS GESTATION OF PREGNANCY: ICD-10-CM

## 2024-01-02 DIAGNOSIS — Z34.81 PRENATAL CARE, SUBSEQUENT PREGNANCY IN FIRST TRIMESTER: Primary | ICD-10-CM

## 2024-01-02 LAB
C. TRACHOMATIS, EXTERNAL RESULT: NEGATIVE
N. GONORRHOEAE, EXTERNAL RESULT: NEGATIVE

## 2024-01-02 NOTE — PROGRESS NOTES
Current pregnancy history:    She presents for the evaluation of amenorrhea and a positive pregnancy test.     LMP:  Patient is unsure of her LMP.    Ultrasound data:  A SINGLE VIABLE 8W0D IUP IS SEEN WITH NORMAL CARDIAC RHYTHM. GESTATIONAL AGE BASED ON PREVIOUS ULTRASOUND. A NORMAL YOLK SAC IS SEEN. RIGHT OVARY NOT VISUALIZED DUE TO BOWEL GAS. RIGHT ADNEXA APPEARS WNL. LEFT OVARY APPEARS WNL. NO FREE FLUID IS SEEN IN THE CDS.     Pregnancy symptoms:    Since her LMP she has experienced  urinary frequency, breast tenderness, and nausea.   She has been vomiting over the last few weeks.  Associated signs and symptoms which she denies: dysuria, discharge, vaginal bleeding.    She states she has gained weight:  Approximately 5 pounds over the last few weeks.    Relevant past pregnancy history:   She has the following pregnancy history: Her last pregnancy was uncomplicated.    She has no history of  delivery.    Relevant past medical history:(relevant to this pregnancy): noncontributory.       Pap/Occupational history:  Last pap smear: last year Results: Normal      Her occupation is: [employer].     Substance history: negative for alcohol, tobacco and street drugs.           Positive for nothing.  Exposure history: There is/are no indoor cat/s in the home.  The patient was instructed to not change the cat litter.   She admits close contact with children on a regular basis.   She has had chicken pox or the vaccine in the past.   Patient denies issues with domestic violence.     Genetic Screening/Teratology Counseling: (Includes patient, baby's father, or anyone in either family with:)  1.  Patient's age >/= 35 at EDC?-- yes  .   2.  Thalassemia (Dutch, Greek, Mediterranean, or  background): MCV<80?--no.     3.  Neural tube defect (meningomyelocele, spina bifida, anencephaly)?--no.   4.  Congenital heart defect?--no.  5.  Down syndrome?--no.   6.  Trino-Sachs (Taoism, Icelandic Croatian)?--no.   7.  Canavan's

## 2024-01-03 LAB
BACTERIA SPEC CULT: NORMAL
SERVICE CMNT-IMP: NORMAL

## 2024-01-06 LAB
C TRACH RRNA SPEC QL NAA+PROBE: NEGATIVE
N GONORRHOEA RRNA SPEC QL NAA+PROBE: NEGATIVE
SPECIMEN SOURCE: NORMAL
T VAGINALIS RRNA SPEC QL NAA+PROBE: NEGATIVE

## 2024-01-16 ENCOUNTER — ROUTINE PRENATAL (OUTPATIENT)
Age: 39
End: 2024-01-16

## 2024-01-16 VITALS — SYSTOLIC BLOOD PRESSURE: 102 MMHG | DIASTOLIC BLOOD PRESSURE: 60 MMHG | BODY MASS INDEX: 26.03 KG/M2 | WEIGHT: 171.2 LBS

## 2024-01-16 DIAGNOSIS — Z36.9 UNSPECIFIED ANTENATAL SCREENING: Primary | ICD-10-CM

## 2024-01-16 DIAGNOSIS — Z3A.08 8 WEEKS GESTATION OF PREGNANCY: ICD-10-CM

## 2024-01-16 LAB
ABO + RH BLD: NORMAL
BLOOD BANK CMNT PATIENT-IMP: NORMAL
BLOOD GROUP ANTIBODIES SERPL: NORMAL
ERYTHROCYTE [DISTWIDTH] IN BLOOD BY AUTOMATED COUNT: 15.4 % (ref 11.5–14.5)
HBV SURFACE AG SER QL: <0.1 INDEX
HBV SURFACE AG SER QL: NEGATIVE
HCT VFR BLD AUTO: 33 % (ref 35–47)
HCV AB SER IA-ACNC: 0.21 INDEX
HCV AB SERPL QL IA: NONREACTIVE
HEP B, EXTERNAL RESULT: NEGATIVE
HEPATITIS C ANTIBODY, EXTERNAL RESULT: NONREACTIVE
HGB BLD-MCNC: 10.6 G/DL (ref 11.5–16)
HIV 1+2 AB+HIV1 P24 AG SERPL QL IA: NONREACTIVE
HIV 1/2 RESULT COMMENT: NORMAL
MCH RBC QN AUTO: 30.7 PG (ref 26–34)
MCHC RBC AUTO-ENTMCNC: 32.1 G/DL (ref 30–36.5)
MCV RBC AUTO: 95.7 FL (ref 80–99)
NRBC # BLD: 0 K/UL (ref 0–0.01)
NRBC BLD-RTO: 0 PER 100 WBC
PLATELET # BLD AUTO: 288 K/UL (ref 150–400)
PMV BLD AUTO: 11.1 FL (ref 8.9–12.9)
RBC # BLD AUTO: 3.45 M/UL (ref 3.8–5.2)
RUBELLA TITER, EXTERNAL RESULT: NORMAL
RUBV IGG SERPL IA-ACNC: NORMAL IU/ML
SPECIMEN EXP DATE BLD: NORMAL
WBC # BLD AUTO: 6.2 K/UL (ref 3.6–11)

## 2024-01-16 NOTE — PROGRESS NOTES
OB Visit:    Nausea is resolving. New OB labs with NIPT's-pt wants gender.  Reviewed need for PNV with iron.  Will take iron supplement if she wants to take a gummy vitamin.  SMITH 4 weeks

## 2024-01-17 LAB
T PALLIDUM AB SER QL IA: NON REACTIVE
VZV IGG SER IA-ACNC: 492 INDEX

## 2024-01-18 LAB
HGB A MFR BLD: 97.3 % (ref 96.4–98.8)
HGB A2 MFR BLD COLUMN CHROM: 2.7 % (ref 1.8–3.2)
HGB F MFR BLD: 0 % (ref 0–2)
HGB FRACT BLD-IMP: NORMAL
HGB S MFR BLD: 0 %

## 2024-02-13 ENCOUNTER — ROUTINE PRENATAL (OUTPATIENT)
Age: 39
End: 2024-02-13

## 2024-02-13 VITALS — DIASTOLIC BLOOD PRESSURE: 70 MMHG | BODY MASS INDEX: 27.22 KG/M2 | WEIGHT: 179 LBS | SYSTOLIC BLOOD PRESSURE: 116 MMHG

## 2024-02-13 DIAGNOSIS — Z3A.14 14 WEEKS GESTATION OF PREGNANCY: ICD-10-CM

## 2024-02-13 DIAGNOSIS — Z34.82 PRENATAL CARE, SUBSEQUENT PREGNANCY IN SECOND TRIMESTER: Primary | ICD-10-CM

## 2024-03-13 ENCOUNTER — PATIENT MESSAGE (OUTPATIENT)
Age: 39
End: 2024-03-13

## 2024-03-13 NOTE — TELEPHONE ENCOUNTER
I called pt and discussed symptoms, she is feeling better, has an apt in 2 weeks with FAS- encouraged her to come in sooner if she needs reassurance. Endorses appropriate fetal movement     Precuations reviewed    KLAUDIA HENDRICKSON CNM

## 2024-03-13 NOTE — TELEPHONE ENCOUNTER
From: Gopal Ferrera  To: KLAUDIA SellersM  Sent: 3/13/2024 10:15 AM EDT  Subject: Tamiflu    Morning, I tested positive for the flu at [facility] on 3/11 and was prescribed Tamiflu. I wasn't able to retain much of anything we went over in the ER. They never checked on the baby but very well knew I was pregnant. I'm on dose #3 and developed a stutter but primarily want to make sure this is safe during pregnancy and what complications (if any) might I run into.

## 2024-03-27 ENCOUNTER — ROUTINE PRENATAL (OUTPATIENT)
Age: 39
End: 2024-03-27

## 2024-03-27 VITALS — BODY MASS INDEX: 27.83 KG/M2 | WEIGHT: 183 LBS

## 2024-03-27 DIAGNOSIS — Z3A.20 20 WEEKS GESTATION OF PREGNANCY: Primary | ICD-10-CM

## 2024-03-27 DIAGNOSIS — Z3A.08 8 WEEKS GESTATION OF PREGNANCY: ICD-10-CM

## 2024-03-27 PROCEDURE — 0502F SUBSEQUENT PRENATAL CARE: CPT

## 2024-03-27 NOTE — PROGRESS NOTES
SMITH at 20w1d.  Doing well. FM+, Denies LOF/VB/cxns  FAS today:  FETAL SURVEY A SINGLE VIABLE IUP AT 20W1D IS SEEN. FETAL CARDIAC MOTION OBSERVED. FETAL ANATOMY WAS WELL VISUALIZED. THERE APPEARS TO BE A ECHOGENIC FOCI WITHIN THE LEFT VENTRICLE OF THE FETAL HEART. APPROPRIATE GROWTH MEASURED. SIZE = DATES. YOLIE, PLACENTA AND CERVIX APPEAR WITHIN NORMAL LIMITS. GENDER: FEMALE  Reviewed echogenic foci, all questions answered. NIPT low risk. Handout given.  RTO 4 wks.  Bairon Freeman, KLAUDIA - AGUSTINM

## 2024-04-24 ENCOUNTER — ROUTINE PRENATAL (OUTPATIENT)
Age: 39
End: 2024-04-24

## 2024-04-24 VITALS — BODY MASS INDEX: 28.92 KG/M2 | DIASTOLIC BLOOD PRESSURE: 60 MMHG | SYSTOLIC BLOOD PRESSURE: 102 MMHG | WEIGHT: 190.2 LBS

## 2024-04-24 DIAGNOSIS — Z3A.08 8 WEEKS GESTATION OF PREGNANCY: ICD-10-CM

## 2024-04-24 DIAGNOSIS — Z34.90 PREGNANCY, UNSPECIFIED GESTATIONAL AGE: Primary | ICD-10-CM

## 2024-04-24 PROCEDURE — 0502F SUBSEQUENT PRENATAL CARE: CPT

## 2024-04-24 RX ORDER — VITAMIN A, ASCORBIC ACID, CHOLECALCIFEROL, TOCOPHEROL, THIAMINE MONONITRATE, RIBOFLAVIN, PYRIDOXINE, FOLIC ACID, CYANOCOBALAMIN, CALCIUM CARBONATE, FERROUS FUMARATE, ZINC OXIDE, CUPRIC OXIDE, NIACINAMIDE, AND FISH OIL 27-1-250MG
KIT ORAL
COMMUNITY

## 2024-04-24 NOTE — PROGRESS NOTES
SMITH at 24w1d.  Doing well. FM+, Denies LOF/VB/cxns.  Some BH cxns. Pt notes they go away with water and rest.   Taking PNV.  Discussed AMA and recommend ultrasound at 36 wk.  RTO 4 wks with glucola.   Bairon Freeman, KLAUDIA - RODOLFO

## 2024-05-21 PROBLEM — Z34.90 PREGNANT: Status: RESOLVED | Noted: 2021-04-08 | Resolved: 2024-05-21

## 2024-05-21 NOTE — PROGRESS NOTES
1 hr gtt and 3rd tri labs today  Apos-- Rhogam NI  Declines tdap today  Doing well overall  Some vision changes but reports eyeglasses prescription is approx 4 years old  Denies HA's/VB/LOF

## 2024-05-22 ENCOUNTER — ROUTINE PRENATAL (OUTPATIENT)
Age: 39
End: 2024-05-22

## 2024-05-22 VITALS — SYSTOLIC BLOOD PRESSURE: 118 MMHG | DIASTOLIC BLOOD PRESSURE: 76 MMHG | WEIGHT: 197.8 LBS | BODY MASS INDEX: 30.08 KG/M2

## 2024-05-22 DIAGNOSIS — Z3A.28 28 WEEKS GESTATION OF PREGNANCY: ICD-10-CM

## 2024-05-22 DIAGNOSIS — Z34.90 PREGNANCY, UNSPECIFIED GESTATIONAL AGE: Primary | ICD-10-CM

## 2024-05-22 LAB
BLOOD BANK CMNT PATIENT-IMP: NORMAL
BLOOD GROUP ANTIBODIES SERPL: NORMAL
ERYTHROCYTE [DISTWIDTH] IN BLOOD BY AUTOMATED COUNT: 14.9 % (ref 11.5–14.5)
GLUCOSE 1H P 100 G GLC PO SERPL-MCNC: 112 MG/DL (ref 65–140)
HCT VFR BLD AUTO: 28.7 % (ref 35–47)
HGB BLD-MCNC: 9.3 G/DL (ref 11.5–16)
HIV 1+2 AB+HIV1 P24 AG SERPL QL IA: NONREACTIVE
HIV 1/2 RESULT COMMENT: NORMAL
HIV, EXTERNAL RESULT: NONREACTIVE
MCH RBC QN AUTO: 28.8 PG (ref 26–34)
MCHC RBC AUTO-ENTMCNC: 32.4 G/DL (ref 30–36.5)
MCV RBC AUTO: 88.9 FL (ref 80–99)
NRBC # BLD: 0 K/UL (ref 0–0.01)
NRBC BLD-RTO: 0 PER 100 WBC
PLATELET # BLD AUTO: 260 K/UL (ref 150–400)
PMV BLD AUTO: 11.3 FL (ref 8.9–12.9)
RBC # BLD AUTO: 3.23 M/UL (ref 3.8–5.2)
T. PALLIDUM (SYPHILIS) ANTIBODY, EXTERNAL RESULT: NONREACTIVE
WBC # BLD AUTO: 7.7 K/UL (ref 3.6–11)

## 2024-05-22 PROCEDURE — 0502F SUBSEQUENT PRENATAL CARE: CPT | Performed by: ADVANCED PRACTICE MIDWIFE

## 2024-05-22 NOTE — PROGRESS NOTES
SMITH:  Gopal Ferrera is a 39 y.o.  at 28w1d  who presents for routine OB appointment      /76   Wt 89.7 kg (197 lb 12.8 oz)   BMI 30.08 kg/m²   FHTs: 150s  FH: 28    ABO: A POSITIVE     RhoGAM: N/A    GTT/labs: Today     Subjective: Doing well, no complaints. Good FM. Denies vaginal bleeding, Leaking of fluid, abnormal cramping.    third trimester precautions given.   labor precautions reviewed.     Reviewed Tdap, Pt declines today.    Discussed BH, pt reports having them, but nothing regular. Discussed vision changes common in pregnancy, discussed waiting until after delivery to see eye doctor.     Delfina To, KLAUDIA - RODOLFO

## 2024-05-23 LAB — T PALLIDUM AB SER QL IA: NON REACTIVE

## 2024-05-28 DIAGNOSIS — O99.012 ANEMIA IN PREGNANCY, SECOND TRIMESTER: Primary | ICD-10-CM

## 2024-05-28 DIAGNOSIS — Z3A.08 8 WEEKS GESTATION OF PREGNANCY: ICD-10-CM

## 2024-06-04 ENCOUNTER — ROUTINE PRENATAL (OUTPATIENT)
Age: 39
End: 2024-06-04

## 2024-06-04 VITALS — SYSTOLIC BLOOD PRESSURE: 116 MMHG | WEIGHT: 198 LBS | DIASTOLIC BLOOD PRESSURE: 72 MMHG | BODY MASS INDEX: 30.11 KG/M2

## 2024-06-04 DIAGNOSIS — Z3A.30 30 WEEKS GESTATION OF PREGNANCY: Primary | ICD-10-CM

## 2024-06-04 DIAGNOSIS — O99.013 ANEMIA DURING PREGNANCY IN THIRD TRIMESTER: ICD-10-CM

## 2024-06-04 DIAGNOSIS — Z3A.08 8 WEEKS GESTATION OF PREGNANCY: ICD-10-CM

## 2024-06-04 PROCEDURE — 0502F SUBSEQUENT PRENATAL CARE: CPT

## 2024-06-04 NOTE — PROGRESS NOTES
SMITH at 30wk.  Doing well. FM+, Denies LOF/VB/cxns.  Declines TDAP.  CBC today.  RTO 2 wks.  Bairon Freeman, KLAUDIA - AGUSTINM

## 2024-06-21 ENCOUNTER — ROUTINE PRENATAL (OUTPATIENT)
Age: 39
End: 2024-06-21

## 2024-06-21 VITALS — SYSTOLIC BLOOD PRESSURE: 100 MMHG | WEIGHT: 202.8 LBS | BODY MASS INDEX: 30.84 KG/M2 | DIASTOLIC BLOOD PRESSURE: 60 MMHG

## 2024-06-21 DIAGNOSIS — Z3A.33 33 WEEKS GESTATION OF PREGNANCY: Primary | ICD-10-CM

## 2024-06-21 DIAGNOSIS — Z3A.08 8 WEEKS GESTATION OF PREGNANCY: ICD-10-CM

## 2024-06-21 LAB
FERRITIN SERPL-MCNC: 32 NG/ML (ref 26–388)
IRON SATN MFR SERPL: 11 % (ref 20–50)
IRON SERPL-MCNC: 56 UG/DL (ref 35–150)
TIBC SERPL-MCNC: 489 UG/DL (ref 250–450)

## 2024-06-21 PROCEDURE — 0502F SUBSEQUENT PRENATAL CARE: CPT | Performed by: ADVANCED PRACTICE MIDWIFE

## 2024-06-21 NOTE — PROGRESS NOTES
OB Visit:    Fe and Ferritin levels today. Gave 3T handout, Patient reported bilateral hip pain.

## 2024-06-21 NOTE — PROGRESS NOTES
Doing well over all  Discussed pregnancy support belts, K-tape  PT after delivery     THAI - discussed compression hose   Floating in a pool  SMITH 2 weeks     ELVI ANDRE-NEREYDA VIDALES, APRN - CNM

## 2024-07-05 ENCOUNTER — ROUTINE PRENATAL (OUTPATIENT)
Age: 39
End: 2024-07-05

## 2024-07-05 VITALS — SYSTOLIC BLOOD PRESSURE: 110 MMHG | WEIGHT: 207.4 LBS | DIASTOLIC BLOOD PRESSURE: 70 MMHG | BODY MASS INDEX: 31.54 KG/M2

## 2024-07-05 DIAGNOSIS — Z3A.35 35 WEEKS GESTATION OF PREGNANCY: Primary | ICD-10-CM

## 2024-07-05 PROCEDURE — 0502F SUBSEQUENT PRENATAL CARE: CPT | Performed by: ADVANCED PRACTICE MIDWIFE

## 2024-07-05 NOTE — PROGRESS NOTES
OB Visit:    Feeling fetal movement. No medical complaints at this time.  Taking Fe, tried another brand, will order Floradix  Discussed Spinning Babies  Feeling hiccups low and pelvic pressure  Pain with IC  Denies PTL s/sx  Sono nxt visit for growth (AMA) and presentation due to hx of Breech/Failed ECV/  Desires Tolac

## 2024-07-17 ENCOUNTER — ROUTINE PRENATAL (OUTPATIENT)
Age: 39
End: 2024-07-17

## 2024-07-17 VITALS — BODY MASS INDEX: 31.63 KG/M2 | WEIGHT: 208 LBS | DIASTOLIC BLOOD PRESSURE: 64 MMHG | SYSTOLIC BLOOD PRESSURE: 108 MMHG

## 2024-07-17 DIAGNOSIS — Z3A.36 36 WEEKS GESTATION OF PREGNANCY: Primary | ICD-10-CM

## 2024-07-17 DIAGNOSIS — Z3A.08 8 WEEKS GESTATION OF PREGNANCY: ICD-10-CM

## 2024-07-17 PROCEDURE — 0502F SUBSEQUENT PRENATAL CARE: CPT

## 2024-07-18 ENCOUNTER — LAB (OUTPATIENT)
Age: 39
End: 2024-07-18

## 2024-07-18 ENCOUNTER — TELEPHONE (OUTPATIENT)
Age: 39
End: 2024-07-18

## 2024-07-18 DIAGNOSIS — O99.013 ANEMIA DURING PREGNANCY IN THIRD TRIMESTER: Primary | ICD-10-CM

## 2024-07-18 LAB
ERYTHROCYTE [DISTWIDTH] IN BLOOD BY AUTOMATED COUNT: 18.5 % (ref 11.5–14.5)
FERRITIN SERPL-MCNC: 35 NG/ML (ref 8–252)
HCT VFR BLD AUTO: 38.8 % (ref 35–47)
HGB BLD-MCNC: 12.1 G/DL (ref 11.5–16)
IRON SATN MFR SERPL: 9 % (ref 20–50)
IRON SERPL-MCNC: 47 UG/DL (ref 35–150)
MCH RBC QN AUTO: 28.6 PG (ref 26–34)
MCHC RBC AUTO-ENTMCNC: 31.2 G/DL (ref 30–36.5)
MCV RBC AUTO: 91.7 FL (ref 80–99)
NRBC # BLD: 0 K/UL (ref 0–0.01)
NRBC BLD-RTO: 0 PER 100 WBC
PLATELET # BLD AUTO: 226 K/UL (ref 150–400)
PMV BLD AUTO: 12.2 FL (ref 8.9–12.9)
RBC # BLD AUTO: 4.23 M/UL (ref 3.8–5.2)
TIBC SERPL-MCNC: 517 UG/DL (ref 250–450)
WBC # BLD AUTO: 8 K/UL (ref 3.6–11)

## 2024-07-25 ENCOUNTER — ROUTINE PRENATAL (OUTPATIENT)
Age: 39
End: 2024-07-25

## 2024-07-25 VITALS — BODY MASS INDEX: 32.39 KG/M2 | DIASTOLIC BLOOD PRESSURE: 80 MMHG | SYSTOLIC BLOOD PRESSURE: 122 MMHG | WEIGHT: 213 LBS

## 2024-07-25 DIAGNOSIS — Z3A.37 37 WEEKS GESTATION OF PREGNANCY: Primary | ICD-10-CM

## 2024-07-25 DIAGNOSIS — Z3A.08 8 WEEKS GESTATION OF PREGNANCY: ICD-10-CM

## 2024-07-25 PROCEDURE — 0502F SUBSEQUENT PRENATAL CARE: CPT

## 2024-07-25 NOTE — PROGRESS NOTES
SMITH at 37w2d.  Doing well. FM+, denies LOF/VB. Occ cxns.  She has had 5lb weight gain in 8 days. Pt notes some swelling in her feet, though this has been persistent. BP wnl.   Discussed IOL with pt. Pt declines at this time. Will continue to consider. Aware of 39-40 wk recommendation.   Reviewed SOL and warning s/s.  RTO 1 wk.     Bairon Freeman, KLAUDIA - AGUSTINM

## 2024-07-26 LAB — GBS, EXTERNAL RESULT: NEGATIVE

## 2024-07-29 LAB
GP B STREP DNA SPEC QL NAA+PROBE: NEGATIVE
SPECIMEN SOURCE: NORMAL

## 2024-08-02 ENCOUNTER — ROUTINE PRENATAL (OUTPATIENT)
Age: 39
End: 2024-08-02

## 2024-08-02 VITALS — DIASTOLIC BLOOD PRESSURE: 70 MMHG | SYSTOLIC BLOOD PRESSURE: 118 MMHG | BODY MASS INDEX: 32.54 KG/M2 | WEIGHT: 214 LBS

## 2024-08-02 DIAGNOSIS — Z3A.39 39 WEEKS GESTATION OF PREGNANCY: Primary | ICD-10-CM

## 2024-08-02 DIAGNOSIS — Z3A.08 8 WEEKS GESTATION OF PREGNANCY: ICD-10-CM

## 2024-08-02 NOTE — PROGRESS NOTES
SMITH at 38w3d.  Doing well. FM+, denies LOF/VB. Occ cxns.  Pt strongly desires TOLAC. Pt had c section for breech presentation in 2021. Pt is vertex today by v scan.   Discussed R/B/A to a repeat c section vs TOLAC, including uterine rupture and associated morbidity and mortality of each.   Pt also desires IOL due to AMA, discomfort and risks associated. Aware IOL increases likelihood of c section and long labor.   IOL requested 7th at 0700.  Plan for membrane sweep Monday with Delfina.   Reviewed SOL and warning s/s.    Bairon Freeman, KLAUDIA - RODOLFO

## 2024-08-06 ENCOUNTER — ROUTINE PRENATAL (OUTPATIENT)
Age: 39
End: 2024-08-06

## 2024-08-06 VITALS — DIASTOLIC BLOOD PRESSURE: 80 MMHG | WEIGHT: 215.4 LBS | BODY MASS INDEX: 32.75 KG/M2 | SYSTOLIC BLOOD PRESSURE: 122 MMHG

## 2024-08-06 DIAGNOSIS — Z3A.39 39 WEEKS GESTATION OF PREGNANCY: Primary | ICD-10-CM

## 2024-08-06 PROCEDURE — 0502F SUBSEQUENT PRENATAL CARE: CPT | Performed by: ADVANCED PRACTICE MIDWIFE

## 2024-08-06 NOTE — PROGRESS NOTES
SMITH:  Gopal Ferrera is a 39 y.o.  at 39w0d who presents for routine OB appointment        /80   Wt 97.7 kg (215 lb 6.4 oz)   BMI 32.75 kg/m²   FHTs: 124  FH: 39    GBS: Negative    Cervical exam: / we discussed risks and benefits of membrane sweep.  Performed, patient tolerated well    Subjective: Doing well, no complaints. Good FM. Denies vaginal bleeding, Leaking of fluid, regular contractions.    We discussed elective induction for discomforts of pregnancy, message to BEAU Modi .    Third trimester precautions given.  Labor precautions reviewed.     Follow up in 1 weeks.     KLAUDIA Westbrook CNM

## 2024-08-07 ENCOUNTER — HOSPITAL ENCOUNTER (INPATIENT)
Facility: HOSPITAL | Age: 39
LOS: 1 days | Discharge: HOME | End: 2024-08-08
Attending: OBSTETRICS & GYNECOLOGY | Admitting: OBSTETRICS & GYNECOLOGY
Payer: COMMERCIAL

## 2024-08-07 PROBLEM — O47.9 UTERINE CONTRACTIONS: Status: ACTIVE | Noted: 2024-08-07

## 2024-08-07 LAB
ABO + RH BLD: NORMAL
BASOPHILS # BLD: 0 K/UL (ref 0–0.1)
BASOPHILS NFR BLD: 0 % (ref 0–1)
BLOOD GROUP ANTIBODIES SERPL: NORMAL
DIFFERENTIAL METHOD BLD: ABNORMAL
EOSINOPHIL # BLD: 0 K/UL (ref 0–0.4)
EOSINOPHIL NFR BLD: 0 % (ref 0–7)
ERYTHROCYTE [DISTWIDTH] IN BLOOD BY AUTOMATED COUNT: 17.6 % (ref 11.5–14.5)
HCT VFR BLD AUTO: 37 % (ref 35–47)
HGB BLD-MCNC: 12.5 G/DL (ref 11.5–16)
IMM GRANULOCYTES # BLD AUTO: 0.1 K/UL (ref 0–0.04)
IMM GRANULOCYTES NFR BLD AUTO: 1 % (ref 0–0.5)
LYMPHOCYTES # BLD: 2.5 K/UL (ref 0.8–3.5)
LYMPHOCYTES NFR BLD: 25 % (ref 12–49)
MCH RBC QN AUTO: 29.1 PG (ref 26–34)
MCHC RBC AUTO-ENTMCNC: 33.8 G/DL (ref 30–36.5)
MCV RBC AUTO: 86.2 FL (ref 80–99)
MONOCYTES # BLD: 1 K/UL (ref 0–1)
MONOCYTES NFR BLD: 10 % (ref 5–13)
NEUTS SEG # BLD: 6.4 K/UL (ref 1.8–8)
NEUTS SEG NFR BLD: 64 % (ref 32–75)
NRBC # BLD: 0 K/UL (ref 0–0.01)
NRBC BLD-RTO: 0 PER 100 WBC
PLATELET # BLD AUTO: 212 K/UL (ref 150–400)
PMV BLD AUTO: 11.8 FL (ref 8.9–12.9)
RBC # BLD AUTO: 4.29 M/UL (ref 3.8–5.2)
RPR SER QL: NONREACTIVE
SPECIMEN EXP DATE BLD: NORMAL
WBC # BLD AUTO: 10 K/UL (ref 3.6–11)

## 2024-08-07 PROCEDURE — 6370000000 HC RX 637 (ALT 250 FOR IP): Performed by: ADVANCED PRACTICE MIDWIFE

## 2024-08-07 PROCEDURE — 36415 COLL VENOUS BLD VENIPUNCTURE: CPT

## 2024-08-07 PROCEDURE — 86901 BLOOD TYPING SEROLOGIC RH(D): CPT

## 2024-08-07 PROCEDURE — 7210000100 HC LABOR FEE PER 1 HR

## 2024-08-07 PROCEDURE — 85025 COMPLETE CBC W/AUTO DIFF WBC: CPT

## 2024-08-07 PROCEDURE — 1120000000 HC RM PRIVATE OB

## 2024-08-07 PROCEDURE — 4500000002 HC ER NO CHARGE

## 2024-08-07 PROCEDURE — 2580000003 HC RX 258: Performed by: ADVANCED PRACTICE MIDWIFE

## 2024-08-07 PROCEDURE — 7220000101 HC DELIVERY VAGINAL/SINGLE

## 2024-08-07 PROCEDURE — 86592 SYPHILIS TEST NON-TREP QUAL: CPT

## 2024-08-07 PROCEDURE — 6360000002 HC RX W HCPCS: Performed by: ADVANCED PRACTICE MIDWIFE

## 2024-08-07 PROCEDURE — 86850 RBC ANTIBODY SCREEN: CPT

## 2024-08-07 PROCEDURE — 86900 BLOOD TYPING SEROLOGIC ABO: CPT

## 2024-08-07 RX ORDER — SODIUM CHLORIDE, SODIUM LACTATE, POTASSIUM CHLORIDE, AND CALCIUM CHLORIDE .6; .31; .03; .02 G/100ML; G/100ML; G/100ML; G/100ML
1000 INJECTION, SOLUTION INTRAVENOUS PRN
Status: DISCONTINUED | OUTPATIENT
Start: 2024-08-07 | End: 2024-08-08 | Stop reason: HOSPADM

## 2024-08-07 RX ORDER — MISOPROSTOL 200 UG/1
200 TABLET ORAL PRN
Status: DISCONTINUED | OUTPATIENT
Start: 2024-08-07 | End: 2024-08-08 | Stop reason: HOSPADM

## 2024-08-07 RX ORDER — SODIUM CHLORIDE, SODIUM LACTATE, POTASSIUM CHLORIDE, AND CALCIUM CHLORIDE .6; .31; .03; .02 G/100ML; G/100ML; G/100ML; G/100ML
500 INJECTION, SOLUTION INTRAVENOUS PRN
Status: DISCONTINUED | OUTPATIENT
Start: 2024-08-07 | End: 2024-08-08 | Stop reason: HOSPADM

## 2024-08-07 RX ORDER — TERBUTALINE SULFATE 1 MG/ML
0.25 INJECTION, SOLUTION SUBCUTANEOUS
Status: ACTIVE | OUTPATIENT
Start: 2024-08-07 | End: 2024-08-08

## 2024-08-07 RX ORDER — DOCUSATE SODIUM 100 MG/1
100 CAPSULE, LIQUID FILLED ORAL 2 TIMES DAILY
Status: DISCONTINUED | OUTPATIENT
Start: 2024-08-07 | End: 2024-08-08 | Stop reason: HOSPADM

## 2024-08-07 RX ORDER — SODIUM CHLORIDE 9 MG/ML
25 INJECTION, SOLUTION INTRAVENOUS PRN
Status: DISCONTINUED | OUTPATIENT
Start: 2024-08-07 | End: 2024-08-08 | Stop reason: HOSPADM

## 2024-08-07 RX ORDER — SODIUM CHLORIDE 0.9 % (FLUSH) 0.9 %
5-40 SYRINGE (ML) INJECTION EVERY 12 HOURS SCHEDULED
Status: DISCONTINUED | OUTPATIENT
Start: 2024-08-07 | End: 2024-08-08 | Stop reason: HOSPADM

## 2024-08-07 RX ORDER — CARBOPROST TROMETHAMINE 250 UG/ML
250 INJECTION, SOLUTION INTRAMUSCULAR PRN
Status: CANCELLED | OUTPATIENT
Start: 2024-08-07

## 2024-08-07 RX ORDER — ONDANSETRON 4 MG/1
4 TABLET, ORALLY DISINTEGRATING ORAL EVERY 6 HOURS PRN
Status: DISCONTINUED | OUTPATIENT
Start: 2024-08-07 | End: 2024-08-07 | Stop reason: SDUPTHER

## 2024-08-07 RX ORDER — MODIFIED LANOLIN
OINTMENT (GRAM) TOPICAL PRN
Status: DISCONTINUED | OUTPATIENT
Start: 2024-08-07 | End: 2024-08-08 | Stop reason: HOSPADM

## 2024-08-07 RX ORDER — IBUPROFEN 400 MG/1
800 TABLET ORAL EVERY 8 HOURS SCHEDULED
Status: DISCONTINUED | OUTPATIENT
Start: 2024-08-07 | End: 2024-08-08 | Stop reason: HOSPADM

## 2024-08-07 RX ORDER — IBUPROFEN 400 MG/1
800 TABLET ORAL EVERY 8 HOURS SCHEDULED
Status: DISCONTINUED | OUTPATIENT
Start: 2024-08-07 | End: 2024-08-07

## 2024-08-07 RX ORDER — SIMETHICONE 80 MG
80 TABLET,CHEWABLE ORAL EVERY 6 HOURS PRN
Status: DISCONTINUED | OUTPATIENT
Start: 2024-08-07 | End: 2024-08-08 | Stop reason: HOSPADM

## 2024-08-07 RX ORDER — CARBOPROST TROMETHAMINE 250 UG/ML
250 INJECTION, SOLUTION INTRAMUSCULAR PRN
Status: DISCONTINUED | OUTPATIENT
Start: 2024-08-07 | End: 2024-08-08 | Stop reason: HOSPADM

## 2024-08-07 RX ORDER — SODIUM CHLORIDE, SODIUM LACTATE, POTASSIUM CHLORIDE, CALCIUM CHLORIDE 600; 310; 30; 20 MG/100ML; MG/100ML; MG/100ML; MG/100ML
INJECTION, SOLUTION INTRAVENOUS CONTINUOUS
Status: DISCONTINUED | OUTPATIENT
Start: 2024-08-07 | End: 2024-08-08 | Stop reason: HOSPADM

## 2024-08-07 RX ORDER — SODIUM CHLORIDE 0.9 % (FLUSH) 0.9 %
5-40 SYRINGE (ML) INJECTION PRN
Status: DISCONTINUED | OUTPATIENT
Start: 2024-08-07 | End: 2024-08-08 | Stop reason: HOSPADM

## 2024-08-07 RX ORDER — MISOPROSTOL 200 UG/1
400 TABLET ORAL PRN
Status: DISCONTINUED | OUTPATIENT
Start: 2024-08-07 | End: 2024-08-08 | Stop reason: HOSPADM

## 2024-08-07 RX ORDER — ONDANSETRON 2 MG/ML
4 INJECTION INTRAMUSCULAR; INTRAVENOUS EVERY 6 HOURS PRN
Status: DISCONTINUED | OUTPATIENT
Start: 2024-08-07 | End: 2024-08-08 | Stop reason: HOSPADM

## 2024-08-07 RX ORDER — SEVOFLURANE 250 ML/250ML
1 LIQUID RESPIRATORY (INHALATION) CONTINUOUS PRN
Status: DISCONTINUED | OUTPATIENT
Start: 2024-08-07 | End: 2024-08-08 | Stop reason: HOSPADM

## 2024-08-07 RX ORDER — ACETAMINOPHEN 500 MG
1000 TABLET ORAL EVERY 8 HOURS SCHEDULED
Status: DISCONTINUED | OUTPATIENT
Start: 2024-08-07 | End: 2024-08-08 | Stop reason: HOSPADM

## 2024-08-07 RX ORDER — METHYLERGONOVINE MALEATE 0.2 MG/ML
200 INJECTION INTRAVENOUS PRN
Status: DISCONTINUED | OUTPATIENT
Start: 2024-08-07 | End: 2024-08-08 | Stop reason: HOSPADM

## 2024-08-07 RX ORDER — ONDANSETRON 2 MG/ML
4 INJECTION INTRAMUSCULAR; INTRAVENOUS EVERY 6 HOURS PRN
Status: DISCONTINUED | OUTPATIENT
Start: 2024-08-07 | End: 2024-08-07 | Stop reason: SDUPTHER

## 2024-08-07 RX ORDER — METHYLERGONOVINE MALEATE 0.2 MG/ML
200 INJECTION INTRAVENOUS PRN
Status: DISCONTINUED | OUTPATIENT
Start: 2024-08-07 | End: 2024-08-07 | Stop reason: SDUPTHER

## 2024-08-07 RX ORDER — ONDANSETRON 4 MG/1
4 TABLET, ORALLY DISINTEGRATING ORAL EVERY 6 HOURS PRN
Status: DISCONTINUED | OUTPATIENT
Start: 2024-08-07 | End: 2024-08-08 | Stop reason: HOSPADM

## 2024-08-07 RX ORDER — MISOPROSTOL 200 UG/1
800 TABLET ORAL PRN
Status: DISCONTINUED | OUTPATIENT
Start: 2024-08-07 | End: 2024-08-08 | Stop reason: HOSPADM

## 2024-08-07 RX ADMIN — IBUPROFEN 800 MG: 400 TABLET, FILM COATED ORAL at 18:29

## 2024-08-07 RX ADMIN — ACETAMINOPHEN 1000 MG: 500 TABLET ORAL at 05:14

## 2024-08-07 RX ADMIN — SODIUM CHLORIDE, POTASSIUM CHLORIDE, SODIUM LACTATE AND CALCIUM CHLORIDE: 600; 310; 30; 20 INJECTION, SOLUTION INTRAVENOUS at 00:15

## 2024-08-07 RX ADMIN — Medication 166.7 ML: at 00:51

## 2024-08-07 RX ADMIN — IBUPROFEN 800 MG: 400 TABLET, FILM COATED ORAL at 02:11

## 2024-08-07 ASSESSMENT — PAIN DESCRIPTION - LOCATION: LOCATION: ABDOMEN

## 2024-08-07 ASSESSMENT — PAIN DESCRIPTION - DESCRIPTORS: DESCRIPTORS: CRAMPING

## 2024-08-07 ASSESSMENT — PAIN SCALES - GENERAL: PAINLEVEL_OUTOF10: 6

## 2024-08-07 ASSESSMENT — PAIN DESCRIPTION - ORIENTATION: ORIENTATION: LOWER

## 2024-08-07 NOTE — ED TRIAGE NOTES
0007: Patient of [facility] (, 39w1d) arrives ambulatory to [facility] 3 with complaints of strong contractions and rupture of membranes that happened around 2300. Patient states all fluid has been clear. Patient endorses fetal movement. Patient denies vaginal bleeding, visual disturbances, RUQ pain, or headaches. VSS. Patient oriented to room and call bell within reach. CNM called to bedside urgently due to labor progression.     0010: HUNTER Garcia CNM at bedside to assess. SVE: 10/100/0. Plan to admit to  for labor & delivery.    0014: Patient transferred from [facility] to  1.    0015: IV started in L forearm. Labs drawn and sent to lab. Consents signed. Admission assessment complete.    0030: Patient began to push at this time. CNM at bedside for delivery.    0034: Live female infant delivered via  by HUNTER To CNM. (See delivery summary).    0047: Cord clamped and cut by FOB.    0051: Placenta delivered. Postpartum Pitocin started at this time per MAR.     0101: Recovery started.     0211: Motrin given per MAR.    0231: Recovery complte. Patietn stable for transfer.     0300: TRANSFER - OUT REPORT:  Verbal report given to MEL Costa on Gopal Ferrera  being transferred to Mother Infant Unit for routine progression of patient care     Report consisted of patient's Situation, Background, Assessment and   Recommendations(SBAR).   Information from the following report(s) Nurse Handoff Report, Index, Intake/Output, MAR, and Recent Results was reviewed with the receiving nurse.       Lines:   Peripheral IV 24 Left Forearm (Active)   Site Assessment Clean, dry & intact 24 010   Line Status Infusing 24 010   Line Care Ports disinfected 24 010   Phlebitis Assessment No symptoms 24 010   Infiltration Assessment 0 24 010   Alcohol Cap Used Yes 24   Dressing Status Clean, dry & intact 24 010   Dressing Type Transparent 24   Dressing Intervention

## 2024-08-07 NOTE — ED PROVIDER NOTES
Department of Obstetrics and Gynecology  Nurse Practitioner Obstetrics History and Physical        CHIEF COMPLAINT:  contractions    HISTORY OF PRESENT ILLNESS:      The patient is a 39 y.o.  6 parity 3023 at 39w1d with an QUAN of 24.  Patient presents to the [facility] for contractions that started around 2100 tonight. They have gotten more intense and closer together since that time. They are now every couple minutes and a 10/10 on the pain scale. Reports SROM about an hour ago for clear fluid. Denies VB. Endorses good fetal movement.     PRENATAL CARE:    Naomi Dorsey    --IOL requested for . Open to pushing to later in week pending cervix on . Will desire sweep. Aware it's a balance of comfort, baby safety, desire for successful . Pt AMA, TOLAC. C section in  for breech. Hx of two vaginal deliveries. Desires  Pt reports her last baby--she had her water broken and had a baby!     --Anemia- (9.3) please get additional labs next visit on ... Fe and ferritin levels, may need Fe infusion- ferritin 32-  pt was instructed to get plant based iron supplements>>Taking Floradix recommended >>repeat CBC, iron, TIBC, ferritin on ---if low, consider infusions ASAP>>corrected. Hgb 12     Preferred name: Gopal   Partner name: Gilles     Provider: RODOLFO     EDC by Early sono  Pertinents:  Prev C/S for Breech, unsuccessful ECV in . Desires TOLAC. Plan for US at 36 wks to verify position.  Anemia.  AMA  COVID + in pregnancy:  Covid vaccinated:  Baby ASA: Needs to start      Teaching checklist  First Tri handout: DT  Second Tri Handout:   Third Tri handout: JRR  Pap  NILM  IOB labs: Urine Culture Neg, GCC Neg, A pos, antibody screen neg, Rubella immune, HIV neg, Hep B&C neg 10.6/33.0, Tpal NR, VZV immune  Panorama:  Low risk female-pt aware JRR  AFP/MSAFP: dec  Third trimester labs:  GTT:112  Rhogam:  GBS:     Anatomy: Offer MFM d/t AMA--> Declines will do here since NIPT

## 2024-08-07 NOTE — H&P
Department of Obstetrics and Gynecology  Nurse Practitioner Inpatient Obstetrics History and Physical           CHIEF COMPLAINT:  contractions     HISTORY OF PRESENT ILLNESS:       The patient is a 39 y.o.  6 parity 3023 at 39w1d with an QUAN of 24.  Patient presents to l and d for active labor. Pt was seen in the [facility] for contractions that started around 2100 tonight. They have gotten more intense and closer together since that time. They are now every couple minutes and a 10/10 on the pain scale. Reports SROM about an hour ago for clear fluid. Denies VB. Endorses good fetal movement.      PRENATAL CARE:     Naomi Dorsey     --IOL requested for . Open to pushing to later in week pending cervix on . Will desire sweep. Aware it's a balance of comfort, baby safety, desire for successful . Pt AMA, TOLAC. C section in  for breech. Hx of two vaginal deliveries. Desires  Pt reports her last baby--she had her water broken and had a baby!     --Anemia- (9.3) please get additional labs next visit on ... Fe and ferritin levels, may need Fe infusion- ferritin 32-  pt was instructed to get plant based iron supplements>>Taking Floradix recommended >>repeat CBC, iron, TIBC, ferritin on ---if low, consider infusions ASAP>>corrected. Hgb 12     Preferred name: Gopal   Partner name: Gilles     Provider: RODOLFO     EDC by Early sono  Pertinents:  Prev C/S for Breech, unsuccessful ECV in . Desires TOLAC. Plan for US at 36 wks to verify position.  Anemia.  AMA  COVID + in pregnancy:  Covid vaccinated:  Baby ASA: Needs to start      Teaching checklist  First Tri handout: DT  Second Tri Handout:   Third Tri handout: JRR  Pap  NILM  IOB labs: Urine Culture Neg, GCC Neg, A pos, antibody screen neg, Rubella immune, HIV neg, Hep B&C neg 10.6/33.0, Tpal NR, VZV immune  Panorama:  Low risk female-pt aware JRR  AFP/MSAFP: dec  Third trimester labs:  GTT:112  Rhogam:  GBS:     Anatomy:  Offer MFM d/t AMA--> Declines will do here since NIPT normal/low risk>echogenic foci  MFM Consult: declines     Flu:  TDAP:     Pain mgmt. in labor:  Feeding:  Breast pump rx:  Pediatrician: Naomi Sainz of [facility]-->  Circ:N/A  Social-  FOB-  Children- Sons Nj born in  & Marianela born in 3/2020 (both ) Daughter Frida  Breech/ (All with Dr. Shaw)  Occupation - Pharmacy Tech                    OB History    Para Term  AB Living   6       2 3   SAB IAB Ectopic Molar Multiple Live Births    2                 # Outcome Date GA Lbr North/2nd Weight Sex Delivery Anes PTL Lv   6 Current                     5                      4                      3                      2 SAB                     1 SAB                           Past Medical History:    Past Medical History            Diagnosis Date    Asthma       As a child    Chlamydia       15 years ago    Heart abnormality       heart murmur    Heart defect       Murmur    Heart murmur       As a child    Postpartum depression       after first delivery, \"mild\" , no medications    Vaginal delivery 3/18/2020         Past Surgical History:    Past Surgical History             Procedure Laterality Date     SECTION   22    DILATION AND CURETTAGE   2019    OTHER SURGICAL HISTORY   2019     D&C         Family History:   Family History             Problem Relation Age of Onset    Diabetes Father      Other Father           CHF    Hypertension Father      Cancer Maternal Grandfather           bladder    Cancer Other           lung    Other Mother           high cholesterol    Spont Abortions Mother      Breast Cancer Maternal Grandmother           Medications Prior to Admission:    Prescriptions Prior to Admission   Medications Prior to Admission: iron-vitamin C  MG TABS, Take 1 tablet by mouth 2 times daily  Ferrous Sulfate (IRON PO), Take by mouth (Patient not taking: Reported on

## 2024-08-07 NOTE — LACTATION NOTE
This note was copied from a baby's chart.  . Mother states that she nursed her first baby for 18 months with good supply, she pumped exclusively with her second baby for one year, and she nursed her third baby for 18 months with good supply. Colostrum easily expressed from both breasts. Assisted mother latching baby to the right breast in the cross cradle hold. Audible swallows noted. Educated mother on feeding cues, feeding on demand, offering both breasts at every feeding, and feeding at least every 3 hours.    Feeding Plan:  Mother will keep baby skin to skin as often as possible, feed on demand, 8-12x/day , respond to feeding cues, obtain latch, listen for audible swallowing, be aware of signs of oxytocin release/ cramping,thirst,sleepiness while breastfeeding, offer both breasts,and will not limit feedings.  Mother agrees to utilize breast massage while nursing to facilitate lactogenesis.

## 2024-08-07 NOTE — L&D DELIVERY NOTE
2024   Gopal Ferrera   39 y.o.   3001/01      Review the Delivery Report for details.      39w1d      Labor Problems:   EBL: * No surgery found *   QBL: pending  Delivery type   Anesthesia:  none  [place] Sex: Female   Weight:   Information for the patient's :  AMOS Ferrera [[id number]]      Resuscitation:            AMOS Ferrera [[id number]]      Apgars    Living status: Living  Apgars   1 Minute:  5 Minute:  10 Minute 15 Minute 20 Minute   Skin Color: 1  1       Heart Rate: 2  2       Reflex Irritability: 2  2       Muscle Tone: 2  2       Respiratory Effort: 2  2       Total: 9  9               Apgars Assigned By: MEL DANIELS                  Delivery Providers   AMOS Ferrera [[id number]]      Delivery Providers    Delivering clinician: Delfina To APRN - CNM   Provider Role    Adela Bingham RN Primary Nurse    Julia Daniels RN Primary [place] Nurse    Delfina To APRN - CNM Midwife                      Delivery Details:  Gopal gorman 39 y.o.,  female delivered a viable female infant over an intact perineum. with Apgars   AMOS Ferrera [[id number]]      Apgars    Living status: Living  Apgars   1 Minute:  5 Minute:  10 Minute 15 Minute 20 Minute   Skin Color: 1  1       Heart Rate: 2  2       Reflex Irritability: 2  2       Muscle Tone: 2  2       Respiratory Effort: 2  2       Total: 9  9               Apgars Assigned By: MEL DANIELS            Patient was fully dilated and pushing. With subsequent contractions she started pushing and delivered vaginally spontaneously, in dorsal lithotomy position Delivery was via Vaginal, Spontaneous to a sterile field under none anesthesia. Infant delivered in Vertex Left occiput anterior position. Anterior and posterior shoulders delivered without difficulty.  The Cord was allowed to cease pulsating. The cord was the clamped, cut by FOB, and 3 vessels were noted. Cord blood was not obtained in routine fashion with the following

## 2024-08-07 NOTE — PROGRESS NOTES
Postpartum  note  Obstetrics Postpartum Progress Note  2024    Events  No events    Subjective  Pain: controlled  Lochia: Small  PO's: taking regular diet  Voiding: without difficulty  Ambulating: yes  Feeding: breastfeeding well    Vitals  /75   Pulse 100   Temp 97.9 °F (36.6 °C) (Oral)   Resp 18   SpO2 95%   Breastfeeding Unknown       Physical Exam [unfilled]  General: alert and oriented times 3 no acute distress  Pulm: unlabored breathing  Fundus: Firm, Midline at U/-1, appropriately tender.  Perineum:  Intact (24 0545 : Julissa Jones, RN) (per RN documentation)  Extremities: symmetric with no edema  Skin: warm and dry      Data  @LABRCNTIP(wbc:3,hgb:3,hct:3,plt:3,creatinine:3,ast:3,alt:3)@  Immunization History   Administered Date(s) Administered    Hepatitis B vaccine 2017    TDaP, ADACEL (age 10y-64y), BOOSTRIX (age 10y+), IM, 0.5mL 2017, 2019, 2021     Lab Results   Component Value Date    ABORH A POSITIVE 2024   @BABYSUPPRESS(resufast,neorh)@     Status  Information for the patient's :  AMOS Ferrera [[id number]]   322/02   Problem-based Assessment and Plan    Gopal Ferrera is a 39 y.o.  postpartum day Numbers 0-3: 0 @ s/p , Spontaneous .    Principal Problem:    Uterine contractions  Active Problems:    Vaginal delivery  Resolved Problems:    * No resolved hospital problems. *    1. Post care: meeting all goals  2. Hemodynamics: stable  3. Pain: controlled with Motrin  4. Breastfeeding: Unknown.    5. Disposition: home postpartum day 2     KLAUDIA Westbrook CNM  2024

## 2024-08-07 NOTE — DISCHARGE INSTRUCTIONS
tampons, douching, or intercourse (sex). Make your follow-up appointment for about six weeks after delivery.  Uterine changes/bleeding/perineal care  Your uterus will still be enlarged after delivery. Some women can feel their uterus as a firm melon sized lump below the belly button. You will feel contractions (afterbirth pains) after delivery as your uterus works to get back to a non-pregnant size. These pains tend to get more intense with each delivery. Breastfeeding mothers may experience more cramping during and after feeding, especially in the early weeks. These contractions are temporary. Use relaxation/breathing techniques, Motrin and/or Tylenol per package directions to lessen discomfort.  The first few days after delivery, your bleeding (also called lochia) is bright red; it changes to dark red, then brown, to light brown/white, much like a normal period. The bleeding will slow down the first few days after delivery. The area where the placenta was forms a sab. You may notice an increase in bleeding or darkening in bleeding around day 7 to 14, as the uterus slowly returns to its non-pregnant size it pushes this scab out. You may also notice an increase in bleeding with overactivity; this may be a sign that you need more rest  Clots  It is not uncommon to pass some blood clots, especially if you have been sitting or lying down for long periods of time. This is normal. If you are passing frequent clots or clots larger than an egg, please call the office. This bleeding usually lasts two to four weeks. You may have light bleeding or continue to spot for up to six weeks.  Perineum (vagina and bottom)  Continue to use the magui-bottle/water bottle to clean your perineum, rinsing front to back with warm water until your bleeding stops. If you have an extensive laceration, we will review specific care with you prior to discharge.  Swelling  It is common for postpartum women to have swelling, especially in their legs  pain relievers. Engorgement usually resolves in two to three days.  If you are struggling with breastfeeding or have questions about breastfeeding, the lactation Warm Line number is [phone]. They can provide you with great tips and help you get connected with a support group.  Further lactation/breastfeeding resources are including in your hospital discharge instructions.  If you have a fever higher than 100.4F, a lump, or red, sore, or hot area in the breast that does not go away after nursing, please call the office so we can help you.  Postpartum mood changes  Families go through many changes when a new baby is born. All family members are adjusting. It is very common for mothers to experience a wide range of emotions. Mild feeling of sadness, depression, and anxiety are common and are due to hormonal changes, lack of sleep, and the demanding job of caring for a . These are frequently referred to as the “baby blues.” These feelings are usually temporary, self-limiting, and usually level out by two weeks after delivery.  Postpartum depression is less common and more serious. It is characterized by feelings of depression, anxiety, worry, hopelessness, crying, feeling like you can't take care of the baby, etc. If you are feeling any of these things, please CALL US! We will help you! Rarely, it may also include thoughts of hurting yourself, others, or the baby. If you are experiencing these thoughts or feelings, seek help immediately. Call 911, go to the nearest emergency room, and contact our office.  These conditions do not reflect your feelings for you baby, your partner, or your abilities as a mother. You cannot control these feeling, and it is not your fault. Again, please call us if you are feeling any of these symptoms prior to your 6 weeks visit.   When to call  Frequent urgency or burning upon urination that is painful  Temperature of 100.4F or above  Increasing pain that is not relieved by

## 2024-08-07 NOTE — LACTATION NOTE
This note was copied from a baby's chart.  Mother latched baby during the consult. Baby was sucking rhythmically with swallows noted. Mother was concerned about how much colostrum she has. Hand expressed large rolling drops of colostrum. Mother feeling more confident now about her supply. Mother will continue to feed baby on demand and not limit time at the breast. Showed mother breast compressions and suggested she do them during nursing sessions.

## 2024-08-08 VITALS
OXYGEN SATURATION: 97 % | HEART RATE: 84 BPM | TEMPERATURE: 97.9 F | DIASTOLIC BLOOD PRESSURE: 75 MMHG | RESPIRATION RATE: 16 BRPM | SYSTOLIC BLOOD PRESSURE: 111 MMHG

## 2024-08-08 PROCEDURE — 6370000000 HC RX 637 (ALT 250 FOR IP): Performed by: ADVANCED PRACTICE MIDWIFE

## 2024-08-08 PROCEDURE — APPNB30 APP NON BILLABLE TIME 0-30 MINS: Performed by: ADVANCED PRACTICE MIDWIFE

## 2024-08-08 PROCEDURE — 97162 PT EVAL MOD COMPLEX 30 MIN: CPT

## 2024-08-08 PROCEDURE — 97116 GAIT TRAINING THERAPY: CPT

## 2024-08-08 PROCEDURE — 97530 THERAPEUTIC ACTIVITIES: CPT

## 2024-08-08 RX ORDER — IBUPROFEN 800 MG/1
800 TABLET ORAL EVERY 8 HOURS SCHEDULED
Qty: 120 TABLET | Refills: 3 | Status: SHIPPED | OUTPATIENT
Start: 2024-08-08

## 2024-08-08 RX ADMIN — IBUPROFEN 800 MG: 400 TABLET, FILM COATED ORAL at 18:42

## 2024-08-08 RX ADMIN — IBUPROFEN 800 MG: 400 TABLET, FILM COATED ORAL at 07:30

## 2024-08-08 RX ADMIN — ACETAMINOPHEN 1000 MG: 500 TABLET ORAL at 01:04

## 2024-08-08 RX ADMIN — DOCUSATE SODIUM 100 MG: 100 CAPSULE, LIQUID FILLED ORAL at 09:14

## 2024-08-08 RX ADMIN — ACETAMINOPHEN 1000 MG: 500 TABLET ORAL at 13:39

## 2024-08-08 ASSESSMENT — PAIN DESCRIPTION - LOCATION
LOCATION: ABDOMEN;PERINEUM
LOCATION: ABDOMEN

## 2024-08-08 ASSESSMENT — PAIN DESCRIPTION - ORIENTATION
ORIENTATION: LOWER
ORIENTATION: LOWER

## 2024-08-08 ASSESSMENT — PAIN - FUNCTIONAL ASSESSMENT
PAIN_FUNCTIONAL_ASSESSMENT: ACTIVITIES ARE NOT PREVENTED
PAIN_FUNCTIONAL_ASSESSMENT: ACTIVITIES ARE NOT PREVENTED

## 2024-08-08 ASSESSMENT — PAIN SCALES - GENERAL
PAINLEVEL_OUTOF10: 6
PAINLEVEL_OUTOF10: 4
PAINLEVEL_OUTOF10: 3

## 2024-08-08 ASSESSMENT — PAIN DESCRIPTION - DESCRIPTORS
DESCRIPTORS: SORE;PRESSURE;CRAMPING
DESCRIPTORS: CRAMPING

## 2024-08-08 NOTE — PROGRESS NOTES
Postpartum Note    S/P , PPD#1  Ambulating still a struggle, easier with walker. PT assessed pt and rx for walker to go home. Pt verbalized understanding and agreement to plan.   Voiding without diff  Guille po and po meds well  Breastfeeding well established  Desires early discharge home    O:  A,A&O x 3        VSS, Afebrile       Patient Vitals for the past 24 hrs:   Temp Pulse Resp BP SpO2   24 0914 97.9 °F (36.6 °C) 84 16 111/75 97 %   24 0100 98.9 °F (37.2 °C) 96 16 109/67 97 %   24 2059 98.4 °F (36.9 °C) 100 16 133/87 97 %          Breasts soft, NT       Abd soft, NT/ND       FF@ U-1, ML       Scant Lochia Rubra       Perineum Intact       Ext without REP, Neg Markie's    A/P: Routine PP care          Discharge home in stable cond.  Follow up with PT as per their orders          RTO 6 wks for PP exam, sooner prn      ELVI ANDRE-KLAUDIA LIU - RODOLFO

## 2024-08-08 NOTE — LACTATION NOTE
This note was copied from a baby's chart.  Baby is nursing well and improved throughout hospital stay.  Deep latch maintained, mother is comfortable, baby feeding vigorously with rhythmic suck, swallow, breathe pattern, audible swallowing, and evident milk transfer, both breasts offered, baby is asleep following feeding. Baby is feeding on demand, mother's milk is in transition, baby's weight loss, voids, and stools are appropriate over past 24 hours. Mother has no further questions for lactation consultant before discharge.

## 2024-08-08 NOTE — PROGRESS NOTES
Bedside and Verbal shift change report given to Ruth VILLAGOMEZ RN (oncoming nurse) by Claire CHRISTIANSON RN (offgoing nurse). Report included the following information Nurse Handoff Report, Index, and Intake/Output.

## 2024-08-08 NOTE — CARE COORDINATION
CM consult placed for walker. CM completed request in [place]. After physician signs order and it is improved by insurance, walker will be delivered by [employer] staff to bedside.    4800  CM delivered walker to bedside.    Fannie Poole, [facility]  Care Management [facility]  [phone]

## 2024-08-08 NOTE — PROGRESS NOTES
PHYSICAL THERAPY EVALUATION/DISCHARGE    Patient: Gopal Ferrera (39 y.o. female)  Date: 8/8/2024  Primary Diagnosis: Uterine contractions [O47.9]  Vaginal delivery [O80]         ASSESSMENT AND RECOMMENDATIONS:  Based on the objective data below, the patient presents with decreased generalized mobility, antalgic gait pattern, decreased activity tolerance, elevated pain levels. This is a 39 year old female who is 2 days post vaginal delivery (without epidural) with complaints of LLE weakness/pain, SIJ pain and discomfort with mobility. She was unable to ambulate yesterday but has been able to slowly progress with help from nursing staff and use of RW. Received in bed agreeable to assessment. Reports that she has had similar pain after each child (this is her 4th birth) and has previously benefit from pelvic floor PT. Patient educated on body mechanics to support lower spine/SIJ in and out of bed via log roll technique, diaphragmatic breathing during transitions, provided gait training with and without RW. Antalgic gait pattern noted during gait training however it improved and she demonstrates increased confidence with use of RW. Agreeable to order RW for discharge as she reports she will be alone in the house at times. Discussed wearing baby with harness/sling when utilizing RW. Discussed very basic HEP to promote increased diaphragmatic breathing and activation, walking daily, changing positions as able, lumbar roll for lordosis support.     Recommend OP women's health/pelvic floor PT when medically cleared, ideally before 6 week check up if possible due to limitations by pain at this time. Patient left in recliner with lumbar roll and proper spinal positioning, reports relief from pain she was experiencing in bed. Discussed session with RN. Appreciate consult. Patient has no further acute PT needs, will sign off.         PLAN :  Recommendation for discharge: (in order for the patient to meet his/her long term

## 2024-08-08 NOTE — DISCHARGE SUMMARY
Discharge Summary    Date: 8/8/2024  Patient Name: Gopal Ferrera    YOB: 1985     Age: 39 y.o.    Admit Date: 8/7/2024  Discharge Date: 8/8/2024  Discharge Condition: Good    Admission Diagnosis  Uterine contractions [O47.9];Vaginal delivery [O80]      Discharge Diagnosis  Principal Problem:    Uterine contractions  Active Problems:    Vaginal delivery  Resolved Problems:    * No resolved hospital problems. *      Hospital Stay  Narrative of Hospital Course:  Normal course  Increased pelvic pain with difficulty walking after delivery     Consultants:  IP CONSULT TO CASE MANAGEMENT  IP CONSULT TO LACTATION    Surgeries/procedures Performed:      Treatments:            Discharge Plan/Disposition:  Home    Hospital/Incidental Findings Requiring Follow Up:    Patient Instructions:    Diet:    Activity:  For number of days (if applicable):      Other Instructions:    Provider Follow-Up:   No follow-ups on file.     Significant Diagnostic Studies:    Recent Labs:  Admission on 08/07/2024  WBC                                           Date: 08/07/2024  Value: 10.0        Ref range: 3.6 - 11.0 K/uL    Status: Final  RBC                                           Date: 08/07/2024  Value: 4.29        Ref range: 3.80 - 5.20 M/uL   Status: Final  Hemoglobin                                    Date: 08/07/2024  Value: 12.5        Ref range: 11.5 - 16.0 g/dL   Status: Final  Hematocrit                                    Date: 08/07/2024  Value: 37.0        Ref range: 35.0 - 47.0 %      Status: Final  MCV                                           Date: 08/07/2024  Value: 86.2        Ref range: 80.0 - 99.0 FL     Status: Final  MCH                                           Date: 08/07/2024  Value: 29.1        Ref range: 26.0 - 34.0 PG     Status: Final  MCHC                                          Date: 08/07/2024  Value: 33.8        Ref range: 30.0 - 36.5 g/dL   Status: Final  RDW

## 2024-09-26 NOTE — PATIENT INSTRUCTIONS
Weeks 18 to 22 of Your Pregnancy: Care Instructions  Your Care Instructions    Your baby is continuing to develop quickly. At this stage, babies can now suck their thumbs,  firmly with their hands, and open and close their eyelids. Sometime between 18 and 22 weeks, you will start to feel your baby move. At first, these small fetal movements feel like fluttering or \"butterflies. \" Some women say that they feel like gas bubbles. As the baby grows, these movements will become stronger. You may also notice that your baby kicks and hiccups. During this time, you may find that your nausea and fatigue are gone. Overall, you may feel better and have more energy than you did in your first trimester. But you may also have new discomforts now, such as sleep problems or leg cramps. This care sheet can help you ease these discomforts. Follow-up care is a key part of your treatment and safety. Be sure to make and go to all appointments, and call your doctor if you are having problems. It's also a good idea to know your test results and keep a list of the medicines you take. How can you care for yourself at home? Ease sleep problems  · Avoid caffeine in drinks or chocolate late in the day. · Get some exercise every day. · Take a warm shower or bath before bed. · Have a light snack or glass of milk at bedtime. · Do relaxation exercises in bed to calm your mind and body. · Support your legs and back with extra pillows. Try a pillow between your legs if you sleep on your side. · Do not use sleeping pills or alcohol. They could harm your baby. Ease leg cramps  · Do not massage your calf during the cramp. · Sit on a firm bed or chair. Straighten your leg, and bend your foot (flex your ankle) slowly upward, toward your knee. Bend your toes up and down. · Stand on a cool, flat surface. Stretch your toes upward, and take small steps walking on your heels.   · Use a heating pad or hot water bottle to help with muscle ache.  Prevent leg cramps  · Be sure to get enough calcium. If you are worried that you are not getting enough, talk to your doctor. · Exercise every day, and stretch your legs before bed. · Take a warm bath before bed, and try leg warmers at night. Where can you learn more? Go to http://valerie-mariana.info/. Enter X976 in the search box to learn more about \"Weeks 18 to 22 of Your Pregnancy: Care Instructions. \"  Current as of: May 29, 2019  Content Version: 12.2  © 6363-1980 [employer]. Care instructions adapted under license by [employer] (which disclaims liability or warranty for this information). If you have questions about a medical condition or this instruction, always ask your healthcare professional. [address] any warranty or liability for your use of this information. [Normal Sclera/Conjunctiva] : normal sclera/conjunctiva [Normal Outer Ear/Nose] : the outer ears and nose were normal in appearance [No JVD] : no jugular venous distention [No Respiratory Distress] : no respiratory distress  [Normal] : affect was normal and insight and judgment were intact [26804 - High Complexity requires an extensive number of possible diagnoses and/or the management options, extensive complexity of the medical data (tests, etc.) to be reviewed, and a high risk of significant complications, morbidity, and/or mortality as w] : High Complexity  [de-identified] : Unable to examine due to telehealth visit

## 2024-10-04 ENCOUNTER — POSTPARTUM VISIT (OUTPATIENT)
Age: 39
End: 2024-10-04

## 2024-10-04 VITALS
WEIGHT: 180 LBS | BODY MASS INDEX: 27.37 KG/M2 | OXYGEN SATURATION: 95 % | HEART RATE: 68 BPM | DIASTOLIC BLOOD PRESSURE: 88 MMHG | SYSTOLIC BLOOD PRESSURE: 128 MMHG

## 2024-10-04 NOTE — PROGRESS NOTES
Gpoal Ferrera is a 39 y.o. female returns for a routine post-partum follow-up visit.       Postpartum Depression: Low Risk  (2024)    [place]  Depression Scale     Last EPDS Total Score: 0     Last EPDS Self Harm Result: Never         Type of delivery: normal spontaneous vaginal delivery  Date of Delivery: 2024  Breastfeeding: yes  Bleeding Resolved: yes  Birth Control: desires to go over options    Problems: Rectal pressure when using the bathroom - now resolved.     1. Have you been to the ER, urgent care clinic, or hospitalized since your last visit? Yes for delivery     2. Have you seen or consulted any other health care providers outside of the [facility] since your last visit? No    Examination chaperoned by Tara Reid MA.

## (undated) DEVICE — 3000CC GUARDIAN II: Brand: GUARDIAN

## (undated) DEVICE — ROYALSILK SURGICAL GOWN, L: Brand: CONVERTORS

## (undated) DEVICE — SOLUTION IRRIG 1000ML 0.9% SOD CHL USP POUR PLAS BTL

## (undated) DEVICE — DRAPE FLD WRM W44XL66IN C6L FOR INTRATEMP SYS THERMABASIN

## (undated) DEVICE — (D)PREP SKN CHLRAPRP APPL 26ML -- CONVERT TO ITEM 371833

## (undated) DEVICE — ANESTHESIA TRAY SPNL W/O NDL PENCAN

## (undated) DEVICE — CATH FOLEY 16F LUBRI-SIL IC --

## (undated) DEVICE — COVERALL PREM SMS 2XL KNIT --

## (undated) DEVICE — SUTURE VCRL SZ 0 L36IN ABSRB VLT L40MM CT 1/2 CIR J358H

## (undated) DEVICE — STERILE POLYISOPRENE POWDER-FREE SURGICAL GLOVES: Brand: PROTEXIS

## (undated) DEVICE — REM POLYHESIVE ADULT PATIENT RETURN ELECTRODE: Brand: VALLEYLAB

## (undated) DEVICE — PACK PROCEDURE SURG C SECT KT SMH

## (undated) DEVICE — SUTURE MCRYL SZ 4-0 L27IN ABSRB UD L24MM PS-1 3/8 CIR PRIM Y935H

## (undated) DEVICE — LIGHT HANDLE: Brand: DEVON

## (undated) DEVICE — MEDI-VAC NON-CONDUCTIVE SUCTION TUBING: Brand: CARDINAL HEALTH

## (undated) DEVICE — SUTURE VCRL SZ 1 L36IN ABSRB VLT L36MM CT-1 1/2 CIR J347H

## (undated) DEVICE — Device: Brand: PORTEX

## (undated) DEVICE — KENDALL SCD EXPRESS SLEEVES, KNEE LENGTH, MEDIUM: Brand: KENDALL SCD

## (undated) DEVICE — SUTURE VCRL SZ 2-0 L36IN ABSRB VLT L36MM CT-1 1/2 CIR J345H

## (undated) DEVICE — SOLUTION IRRIG 1000ML STRL H2O USP PLAS POUR BTL

## (undated) DEVICE — DRESSING SIL W4XL5IN ANTIBACT GELLING FBR CYTOFORM

## (undated) DEVICE — SOLIDIFIER MEDC 1200ML -- CONVERT TO 356117

## (undated) DEVICE — DEVON™ KNEE AND BODY STRAP 60" X 3" (1.5 M X 7.6 CM): Brand: DEVON

## (undated) DEVICE — ELECTROSURGICAL DEVICE HOLSTER;FOR USE WITH MAXIMUM PEAK VOLTAGE OF 4000 V: Brand: FORCE TRIVERSE